# Patient Record
Sex: MALE | Race: WHITE | NOT HISPANIC OR LATINO | Employment: FULL TIME | URBAN - NONMETROPOLITAN AREA
[De-identification: names, ages, dates, MRNs, and addresses within clinical notes are randomized per-mention and may not be internally consistent; named-entity substitution may affect disease eponyms.]

---

## 2018-07-25 ENCOUNTER — PREP FOR SURGERY (OUTPATIENT)
Dept: OTHER | Facility: HOSPITAL | Age: 53
End: 2018-07-25

## 2018-07-25 DIAGNOSIS — H25.11 AGE-RELATED NUCLEAR CATARACT, RIGHT: Primary | ICD-10-CM

## 2018-07-25 RX ORDER — LIDOCAINE HYDROCHLORIDE 40 MG/ML
2 INJECTION, SOLUTION RETROBULBAR; TOPICAL
Status: CANCELLED | OUTPATIENT
Start: 2018-07-25 | End: 2018-08-20

## 2018-07-25 RX ORDER — OFLOXACIN 3 MG/ML
1 SOLUTION/ DROPS OPHTHALMIC EVERY 8 HOURS
COMMUNITY
End: 2021-07-27

## 2018-07-25 RX ORDER — POLYMYXIN B SULFATE AND TRIMETHOPRIM 1; 10000 MG/ML; [USP'U]/ML
1 SOLUTION OPHTHALMIC ONCE
Status: CANCELLED | OUTPATIENT
Start: 2018-07-25 | End: 2018-07-25

## 2018-07-25 RX ORDER — TETRACAINE HYDROCHLORIDE 5 MG/ML
2 SOLUTION OPHTHALMIC AS NEEDED
Status: CANCELLED | OUTPATIENT
Start: 2018-07-25 | End: 2019-07-25

## 2018-07-25 RX ORDER — LEVOTHYROXINE SODIUM 112 UG/1
112 TABLET ORAL DAILY
COMMUNITY

## 2018-07-25 RX ORDER — PHENYLEPHRINE HYDROCHLORIDE 100 MG/ML
1 SOLUTION/ DROPS OPHTHALMIC
Status: CANCELLED | OUTPATIENT
Start: 2018-07-25 | End: 2018-07-25

## 2018-07-25 RX ORDER — ACETAMINOPHEN 500 MG
1000 TABLET ORAL EVERY 6 HOURS PRN
COMMUNITY

## 2018-07-25 RX ORDER — IBUPROFEN 800 MG/1
800 TABLET ORAL EVERY 6 HOURS PRN
COMMUNITY
End: 2021-07-27

## 2018-07-25 RX ORDER — FLUTICASONE PROPIONATE 50 MCG
2 SPRAY, SUSPENSION (ML) NASAL DAILY
COMMUNITY

## 2018-07-25 RX ORDER — CYCLOPENTOLATE HYDROCHLORIDE 20 MG/ML
1 SOLUTION/ DROPS OPHTHALMIC
Status: CANCELLED | OUTPATIENT
Start: 2018-07-25 | End: 2018-07-25

## 2018-07-25 RX ORDER — PREDNISOLONE ACETATE 10 MG/ML
1 SUSPENSION/ DROPS OPHTHALMIC 4 TIMES DAILY
Status: CANCELLED | OUTPATIENT
Start: 2018-07-25 | End: 2019-07-25

## 2018-07-26 ENCOUNTER — ANESTHESIA EVENT (OUTPATIENT)
Dept: PERIOP | Facility: HOSPITAL | Age: 53
End: 2018-07-26

## 2018-07-26 ENCOUNTER — HOSPITAL ENCOUNTER (OUTPATIENT)
Facility: HOSPITAL | Age: 53
Setting detail: HOSPITAL OUTPATIENT SURGERY
Discharge: HOME | End: 2018-07-26
Attending: OPHTHALMOLOGY | Admitting: OPHTHALMOLOGY

## 2018-07-26 ENCOUNTER — ANESTHESIA (OUTPATIENT)
Dept: PERIOP | Facility: HOSPITAL | Age: 53
End: 2018-07-26

## 2018-07-26 VITALS
OXYGEN SATURATION: 98 % | WEIGHT: 270 LBS | DIASTOLIC BLOOD PRESSURE: 73 MMHG | HEART RATE: 54 BPM | HEIGHT: 69 IN | BODY MASS INDEX: 39.99 KG/M2 | RESPIRATION RATE: 20 BRPM | SYSTOLIC BLOOD PRESSURE: 122 MMHG | TEMPERATURE: 97.3 F

## 2018-07-26 DIAGNOSIS — H25.11 AGE-RELATED NUCLEAR CATARACT, RIGHT: ICD-10-CM

## 2018-07-26 PROCEDURE — V2632 POST CHMBR INTRAOCULAR LENS: HCPCS | Performed by: OPHTHALMOLOGY

## 2018-07-26 PROCEDURE — 25010000002 MIDAZOLAM PER 1 MG: Performed by: NURSE ANESTHETIST, CERTIFIED REGISTERED

## 2018-07-26 PROCEDURE — 25010000002 EPINEPHRINE PER 0.1 MG: Performed by: OPHTHALMOLOGY

## 2018-07-26 DEVICE — LENS ACRYSOF IQ 6X13MM SN60WF 25.5: Type: IMPLANTABLE DEVICE | Site: POSTERIOR CHAMBER | Status: FUNCTIONAL

## 2018-07-26 RX ORDER — TETRACAINE HYDROCHLORIDE 5 MG/ML
SOLUTION OPHTHALMIC AS NEEDED
Status: DISCONTINUED | OUTPATIENT
Start: 2018-07-26 | End: 2018-07-26 | Stop reason: HOSPADM

## 2018-07-26 RX ORDER — POLYMYXIN B SULFATE AND TRIMETHOPRIM 1; 10000 MG/ML; [USP'U]/ML
SOLUTION OPHTHALMIC AS NEEDED
Status: DISCONTINUED | OUTPATIENT
Start: 2018-07-26 | End: 2018-07-26 | Stop reason: HOSPADM

## 2018-07-26 RX ORDER — PHENYLEPHRINE HYDROCHLORIDE 100 MG/ML
1 SOLUTION/ DROPS OPHTHALMIC
Status: COMPLETED | OUTPATIENT
Start: 2018-07-26 | End: 2018-07-26

## 2018-07-26 RX ORDER — CYCLOPENTOLATE HYDROCHLORIDE 20 MG/ML
1 SOLUTION/ DROPS OPHTHALMIC
Status: COMPLETED | OUTPATIENT
Start: 2018-07-26 | End: 2018-07-26

## 2018-07-26 RX ORDER — PREDNISOLONE ACETATE 10 MG/ML
1 SUSPENSION/ DROPS OPHTHALMIC 4 TIMES DAILY
Status: DISCONTINUED | OUTPATIENT
Start: 2018-07-26 | End: 2018-07-26 | Stop reason: HOSPADM

## 2018-07-26 RX ORDER — LIDOCAINE HYDROCHLORIDE 40 MG/ML
SOLUTION TOPICAL AS NEEDED
Status: DISCONTINUED | OUTPATIENT
Start: 2018-07-26 | End: 2018-07-26 | Stop reason: HOSPADM

## 2018-07-26 RX ORDER — BALANCED SALT SOLUTION 6.4; .75; .48; .3; 3.9; 1.7 MG/ML; MG/ML; MG/ML; MG/ML; MG/ML; MG/ML
SOLUTION OPHTHALMIC AS NEEDED
Status: DISCONTINUED | OUTPATIENT
Start: 2018-07-26 | End: 2018-07-26 | Stop reason: HOSPADM

## 2018-07-26 RX ORDER — MIDAZOLAM HYDROCHLORIDE 1 MG/ML
INJECTION INTRAMUSCULAR; INTRAVENOUS AS NEEDED
Status: DISCONTINUED | OUTPATIENT
Start: 2018-07-26 | End: 2018-07-26 | Stop reason: SURG

## 2018-07-26 RX ORDER — SODIUM CHLORIDE, SODIUM LACTATE, POTASSIUM CHLORIDE, CALCIUM CHLORIDE 600; 310; 30; 20 MG/100ML; MG/100ML; MG/100ML; MG/100ML
1000 INJECTION, SOLUTION INTRAVENOUS CONTINUOUS
Status: DISCONTINUED | OUTPATIENT
Start: 2018-07-26 | End: 2018-07-26 | Stop reason: HOSPADM

## 2018-07-26 RX ORDER — EPINEPHRINE 1 MG/ML
INJECTION, SOLUTION, CONCENTRATE INTRAVENOUS AS NEEDED
Status: DISCONTINUED | OUTPATIENT
Start: 2018-07-26 | End: 2018-07-26 | Stop reason: HOSPADM

## 2018-07-26 RX ADMIN — PHENYLEPHRINE HYDROCHLORIDE 1 DROP: 100 SOLUTION/ DROPS OPHTHALMIC at 09:35

## 2018-07-26 RX ADMIN — CYCLOPENTOLATE HYDROCHLORIDE 1 DROP: 20 SOLUTION/ DROPS OPHTHALMIC at 09:35

## 2018-07-26 RX ADMIN — CYCLOPENTOLATE HYDROCHLORIDE 1 DROP: 20 SOLUTION/ DROPS OPHTHALMIC at 09:30

## 2018-07-26 RX ADMIN — CYCLOPENTOLATE HYDROCHLORIDE 1 DROP: 20 SOLUTION/ DROPS OPHTHALMIC at 09:25

## 2018-07-26 RX ADMIN — SODIUM CHLORIDE, POTASSIUM CHLORIDE, SODIUM LACTATE AND CALCIUM CHLORIDE 1000 ML: 600; 310; 30; 20 INJECTION, SOLUTION INTRAVENOUS at 09:33

## 2018-07-26 RX ADMIN — PHENYLEPHRINE HYDROCHLORIDE 1 DROP: 100 SOLUTION/ DROPS OPHTHALMIC at 09:30

## 2018-07-26 RX ADMIN — PHENYLEPHRINE HYDROCHLORIDE 1 DROP: 100 SOLUTION/ DROPS OPHTHALMIC at 09:25

## 2018-07-26 RX ADMIN — MIDAZOLAM HYDROCHLORIDE 0.5 MG: 1 INJECTION, SOLUTION INTRAMUSCULAR; INTRAVENOUS at 10:16

## 2018-07-26 NOTE — ANESTHESIA PREPROCEDURE EVALUATION
Anesthesia Evaluation     Patient summary reviewed and Nursing notes reviewed   no history of anesthetic complications:  NPO Solid Status: > 8 hours  NPO Liquid Status: > 8 hours           Airway   Mallampati: I  TM distance: >3 FB  Neck ROM: full  no difficulty expected  Dental - normal exam     Pulmonary - negative pulmonary ROS and normal exam   Cardiovascular - negative cardio ROS and normal exam        Neuro/Psych- negative ROS  GI/Hepatic/Renal/Endo - negative ROS     Musculoskeletal (-) negative ROS    Abdominal    Substance History - negative use     OB/GYN negative ob/gyn ROS         Other - negative ROS                       Anesthesia Plan    ASA 1     MAC     intravenous induction   Anesthetic plan and risks discussed with patient.

## 2018-07-26 NOTE — ANESTHESIA POSTPROCEDURE EVALUATION
Patient: Sandra Garza    Procedure Summary     Date:  07/26/18 Room / Location:  [facility] OR 1 / [facility] OR    Anesthesia Start:  1016 Anesthesia Stop:  1033    Procedure:  CATARACT PHACO EXTRACTION WITH INTRAOCULAR LENS IMPLANT RIGHT (Right Eye) Diagnosis:       Nuclear sclerotic cataract of right eye      (Nuclear sclerotic cataract of right eye [H25.11])    Surgeon:  Melquiades Campo MD Provider:  Shaheed Noe CRNA    Anesthesia Type:  MAC ASA Status:  1          Anesthesia Type: MAC  Last vitals  BP   122/73 (07/26/18 1105)   Temp   97.3 °F (36.3 °C) (07/26/18 1035)   Pulse   54 (07/26/18 1105)   Resp   20 (07/26/18 1105)     SpO2   98 % (07/26/18 1105)     Post Anesthesia Care and Evaluation    Patient location during evaluation: bedside  Patient participation: complete - patient participated  Level of consciousness: awake  Pain score: 0  Pain management: adequate  Airway patency: patent  Anesthetic complications: No anesthetic complications  PONV Status: controlled  Cardiovascular status: acceptable and stable  Respiratory status: acceptable and room air  Hydration status: acceptable

## 2018-08-06 ENCOUNTER — PREP FOR SURGERY (OUTPATIENT)
Dept: OTHER | Facility: HOSPITAL | Age: 53
End: 2018-08-06

## 2018-08-06 DIAGNOSIS — H25.12 AGE-RELATED NUCLEAR CATARACT, LEFT: Primary | ICD-10-CM

## 2018-08-06 RX ORDER — PREDNISOLONE ACETATE 10 MG/ML
1 SUSPENSION/ DROPS OPHTHALMIC 4 TIMES DAILY
Status: CANCELLED | OUTPATIENT
Start: 2018-08-06

## 2018-08-06 RX ORDER — TETRACAINE HYDROCHLORIDE 5 MG/ML
2 SOLUTION OPHTHALMIC AS NEEDED
Status: CANCELLED | OUTPATIENT
Start: 2018-08-06

## 2018-08-06 RX ORDER — PHENYLEPHRINE HYDROCHLORIDE 100 MG/ML
1 SOLUTION/ DROPS OPHTHALMIC
Status: CANCELLED | OUTPATIENT
Start: 2018-08-06 | End: 2018-08-06

## 2018-08-06 RX ORDER — CYCLOPENTOLATE HYDROCHLORIDE 20 MG/ML
1 SOLUTION/ DROPS OPHTHALMIC
Status: CANCELLED | OUTPATIENT
Start: 2018-08-06 | End: 2018-08-06

## 2018-08-06 RX ORDER — POLYMYXIN B SULFATE AND TRIMETHOPRIM 1; 10000 MG/ML; [USP'U]/ML
1 SOLUTION OPHTHALMIC ONCE
Status: CANCELLED | OUTPATIENT
Start: 2018-08-06 | End: 2018-08-06

## 2018-08-06 RX ORDER — LIDOCAINE HYDROCHLORIDE 40 MG/ML
2 INJECTION, SOLUTION RETROBULBAR; TOPICAL
Status: CANCELLED | OUTPATIENT
Start: 2018-08-06

## 2018-08-09 ENCOUNTER — ANESTHESIA (OUTPATIENT)
Dept: PERIOP | Facility: HOSPITAL | Age: 53
End: 2018-08-09

## 2018-08-09 ENCOUNTER — ANESTHESIA EVENT (OUTPATIENT)
Dept: PERIOP | Facility: HOSPITAL | Age: 53
End: 2018-08-09

## 2018-08-09 ENCOUNTER — HOSPITAL ENCOUNTER (OUTPATIENT)
Facility: HOSPITAL | Age: 53
Setting detail: HOSPITAL OUTPATIENT SURGERY
Discharge: HOME | End: 2018-08-09
Attending: OPHTHALMOLOGY | Admitting: OPHTHALMOLOGY

## 2018-08-09 VITALS
RESPIRATION RATE: 20 BRPM | OXYGEN SATURATION: 98 % | HEIGHT: 69 IN | WEIGHT: 270 LBS | HEART RATE: 59 BPM | SYSTOLIC BLOOD PRESSURE: 144 MMHG | TEMPERATURE: 97.3 F | BODY MASS INDEX: 39.99 KG/M2 | DIASTOLIC BLOOD PRESSURE: 79 MMHG

## 2018-08-09 DIAGNOSIS — H25.12 AGE-RELATED NUCLEAR CATARACT, LEFT: ICD-10-CM

## 2018-08-09 PROCEDURE — 25010000002 EPINEPHRINE PER 0.1 MG: Performed by: OPHTHALMOLOGY

## 2018-08-09 PROCEDURE — 25010000002 MIDAZOLAM PER 1 MG: Performed by: NURSE ANESTHETIST, CERTIFIED REGISTERED

## 2018-08-09 PROCEDURE — V2632 POST CHMBR INTRAOCULAR LENS: HCPCS | Performed by: OPHTHALMOLOGY

## 2018-08-09 DEVICE — LENS ACRYSOF IQ 6X13MM SN60WF 24.0: Type: IMPLANTABLE DEVICE | Site: POSTERIOR CHAMBER | Status: FUNCTIONAL

## 2018-08-09 RX ORDER — PREDNISOLONE ACETATE 10 MG/ML
1 SUSPENSION/ DROPS OPHTHALMIC 4 TIMES DAILY
Status: DISCONTINUED | OUTPATIENT
Start: 2018-08-09 | End: 2018-08-09 | Stop reason: HOSPADM

## 2018-08-09 RX ORDER — TETRACAINE HYDROCHLORIDE 5 MG/ML
SOLUTION OPHTHALMIC AS NEEDED
Status: DISCONTINUED | OUTPATIENT
Start: 2018-08-09 | End: 2018-08-09 | Stop reason: HOSPADM

## 2018-08-09 RX ORDER — CYCLOPENTOLATE HYDROCHLORIDE 20 MG/ML
1 SOLUTION/ DROPS OPHTHALMIC
Status: COMPLETED | OUTPATIENT
Start: 2018-08-09 | End: 2018-08-09

## 2018-08-09 RX ORDER — POLYMYXIN B SULFATE AND TRIMETHOPRIM 1; 10000 MG/ML; [USP'U]/ML
SOLUTION OPHTHALMIC AS NEEDED
Status: DISCONTINUED | OUTPATIENT
Start: 2018-08-09 | End: 2018-08-09 | Stop reason: HOSPADM

## 2018-08-09 RX ORDER — PHENYLEPHRINE HYDROCHLORIDE 100 MG/ML
1 SOLUTION/ DROPS OPHTHALMIC
Status: COMPLETED | OUTPATIENT
Start: 2018-08-09 | End: 2018-08-09

## 2018-08-09 RX ORDER — SODIUM CHLORIDE 0.9 % (FLUSH) 0.9 %
3 SYRINGE (ML) INJECTION AS NEEDED
Status: DISCONTINUED | OUTPATIENT
Start: 2018-08-09 | End: 2018-08-09 | Stop reason: HOSPADM

## 2018-08-09 RX ORDER — SODIUM CHLORIDE, SODIUM LACTATE, POTASSIUM CHLORIDE, CALCIUM CHLORIDE 600; 310; 30; 20 MG/100ML; MG/100ML; MG/100ML; MG/100ML
1000 INJECTION, SOLUTION INTRAVENOUS CONTINUOUS
Status: DISCONTINUED | OUTPATIENT
Start: 2018-08-09 | End: 2018-08-09 | Stop reason: HOSPADM

## 2018-08-09 RX ORDER — LIDOCAINE HYDROCHLORIDE 40 MG/ML
SOLUTION TOPICAL AS NEEDED
Status: DISCONTINUED | OUTPATIENT
Start: 2018-08-09 | End: 2018-08-09 | Stop reason: HOSPADM

## 2018-08-09 RX ORDER — BALANCED SALT SOLUTION 6.4; .75; .48; .3; 3.9; 1.7 MG/ML; MG/ML; MG/ML; MG/ML; MG/ML; MG/ML
SOLUTION OPHTHALMIC AS NEEDED
Status: DISCONTINUED | OUTPATIENT
Start: 2018-08-09 | End: 2018-08-09 | Stop reason: HOSPADM

## 2018-08-09 RX ORDER — EPINEPHRINE 1 MG/ML
INJECTION, SOLUTION, CONCENTRATE INTRAVENOUS AS NEEDED
Status: DISCONTINUED | OUTPATIENT
Start: 2018-08-09 | End: 2018-08-09 | Stop reason: HOSPADM

## 2018-08-09 RX ORDER — MIDAZOLAM HYDROCHLORIDE 1 MG/ML
INJECTION INTRAMUSCULAR; INTRAVENOUS AS NEEDED
Status: DISCONTINUED | OUTPATIENT
Start: 2018-08-09 | End: 2018-08-09 | Stop reason: SURG

## 2018-08-09 RX ADMIN — PHENYLEPHRINE HYDROCHLORIDE 1 DROP: 100 SOLUTION/ DROPS OPHTHALMIC at 09:47

## 2018-08-09 RX ADMIN — PHENYLEPHRINE HYDROCHLORIDE 1 DROP: 100 SOLUTION/ DROPS OPHTHALMIC at 09:37

## 2018-08-09 RX ADMIN — CYCLOPENTOLATE HYDROCHLORIDE 1 DROP: 20 SOLUTION/ DROPS OPHTHALMIC at 09:37

## 2018-08-09 RX ADMIN — SODIUM CHLORIDE, POTASSIUM CHLORIDE, SODIUM LACTATE AND CALCIUM CHLORIDE 1000 ML: 600; 310; 30; 20 INJECTION, SOLUTION INTRAVENOUS at 09:50

## 2018-08-09 RX ADMIN — MIDAZOLAM HYDROCHLORIDE 0.5 MG: 1 INJECTION, SOLUTION INTRAMUSCULAR; INTRAVENOUS at 10:28

## 2018-08-09 RX ADMIN — CYCLOPENTOLATE HYDROCHLORIDE 1 DROP: 20 SOLUTION/ DROPS OPHTHALMIC at 09:42

## 2018-08-09 RX ADMIN — CYCLOPENTOLATE HYDROCHLORIDE 1 DROP: 20 SOLUTION/ DROPS OPHTHALMIC at 09:47

## 2018-08-09 RX ADMIN — PHENYLEPHRINE HYDROCHLORIDE 1 DROP: 100 SOLUTION/ DROPS OPHTHALMIC at 09:42

## 2018-08-09 NOTE — ANESTHESIA POSTPROCEDURE EVALUATION
Patient: Sandra Garza    Procedure Summary     Date:  08/09/18 Room / Location:  [facility] OR 1 /  [facility] OR    Anesthesia Start:  1026 Anesthesia Stop:  1044    Procedure:  CATARACT PHACO EXTRACTION WITH INTRAOCULAR LENS IMPLANT LEFT (Left Eye) Diagnosis:       Nuclear sclerotic cataract of left eye      (Nuclear sclerotic cataract of left eye [H25.12])    Surgeon:  Melquiades Campo MD Provider:  Isela Mejia CRNA    Anesthesia Type:  MAC ASA Status:  1          Anesthesia Type: MAC  Last vitals  BP   141/92 (08/09/18 1047)   Temp   97.3 °F (36.3 °C) (08/09/18 1047)   Pulse   58 (08/09/18 1047)   Resp   20 (08/09/18 1047)     SpO2   98 % (08/09/18 1047)     Post Anesthesia Care and Evaluation    Patient location during evaluation: PHASE II  Patient participation: complete - patient participated  Level of consciousness: awake and alert  Pain score: 0  Pain management: satisfactory to patient  Airway patency: patent  Anesthetic complications: No anesthetic complications  PONV Status: none  Cardiovascular status: acceptable and stable  Respiratory status: acceptable  Hydration status: acceptable
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
## 2018-08-09 NOTE — ANESTHESIA PREPROCEDURE EVALUATION
Anesthesia Evaluation     Patient summary reviewed and Nursing notes reviewed   no history of anesthetic complications:  NPO Solid Status: > 8 hours  NPO Liquid Status: > 8 hours           Airway   Mallampati: I  TM distance: >3 FB  Neck ROM: full  no difficulty expected  Dental - normal exam     Pulmonary - negative pulmonary ROS and normal exam   Cardiovascular - negative cardio ROS and normal exam        Neuro/Psych- negative ROS  GI/Hepatic/Renal/Endo - negative ROS     Musculoskeletal (-) negative ROS    Abdominal    Substance History - negative use     OB/GYN negative ob/gyn ROS         Other - negative ROS                         Anesthesia Plan    ASA 1     MAC   (Risks and benefits discussed including risk of aspiration, recall and dental damage. All patient questions answered. Will continue with POC.)  intravenous induction   Anesthetic plan and risks discussed with patient.

## 2018-08-09 NOTE — DISCHARGE INSTRUCTIONS
Please follow all post op instructions and follow up appointment time from your physician's office included in your discharge packet.  .   No pushing, pulling, tugging,  heavy lifting, or strenuous activity.  No major decision making, driving, or drinking alcoholic beverages for 24 hours. ( due to the medications you have  received)  Always use good hand hygiene/washing techniques.  NO driving while taking pain medications.      To assist you in voiding:  Drink plenty of fluids  Listen to running water while attempting to void.    If you are unable to urinate and you have an uncomfortable urge to void or it has been   6 hours since you were discharged, return to the Emergency Room

## 2021-06-04 ENCOUNTER — TELEPHONE (OUTPATIENT)
Dept: SURGERY | Facility: CLINIC | Age: 56
End: 2021-06-04

## 2021-06-07 NOTE — TELEPHONE ENCOUNTER
Pt scheduled for open access screening colonoscopy @  07/28/2021.PRESCREENING FOR OPEN ACCESS SCHEDULING    Sandra Garza, 1965  [id number]    06/07/21    If, the patient answers yes to any of the following questions the provider will be informed prior to scheduling open access for approval and documented in the chart.    []  Yes  [x] No    1. Have you ever had a colonoscopy in the past?      When:        Where:       Polyps or other:     []  Yes  [x] No    2. Family history of colon cancer?      Relation:       Age of onset:       Do you currently have any of the following?    []  Yes  [x] No  Rectal bleeding, if so, how long?     []  Yes  [x] No  Abdominal pain, if so, how long?    []  Yes  [x] No  Constipation, if so, how long?    []  Yes  [x] No  Diarrhea, if so, how long?    []  Yes  [x] No  Weight loss, is so, how much?    [] Yes  [x] No  Small caliber stool, if so, how long?      Have you ever had any of the following conditions?    [] Yes  [x] No  Heart attack?      When?       Last cardiac workup?     Blood thinners?    [] Yes  [x] No   Lung problems, asthma or COPD?  [] Yes  [x] No  Oxygen required?       [] Yes  [x] No  Stroke?     [] Yes  [x] No  Have you ever had a reaction to anesthesia?

## 2021-06-08 RX ORDER — BISACODYL 5 MG
TABLET, DELAYED RELEASE (ENTERIC COATED) ORAL
Qty: 4 TABLET | Refills: 0 | Status: SHIPPED | OUTPATIENT
Start: 2021-06-08

## 2021-06-08 RX ORDER — POLYETHYLENE GLYCOL 3350 17 G/17G
238 POWDER, FOR SOLUTION ORAL ONCE
Qty: 238 G | Refills: 0 | Status: SHIPPED | OUTPATIENT
Start: 2021-06-08 | End: 2021-06-08

## 2021-06-11 ENCOUNTER — PREP FOR SURGERY (OUTPATIENT)
Dept: OTHER | Facility: HOSPITAL | Age: 56
End: 2021-06-11

## 2021-06-11 DIAGNOSIS — Z12.11 SCREENING FOR COLON CANCER: Primary | ICD-10-CM

## 2021-06-29 ENCOUNTER — PREP FOR SURGERY (OUTPATIENT)
Dept: OTHER | Facility: HOSPITAL | Age: 56
End: 2021-06-29

## 2021-06-29 DIAGNOSIS — Z12.11 SCREENING FOR COLON CANCER: Primary | ICD-10-CM

## 2021-07-14 PROBLEM — Z12.11 SCREENING FOR COLON CANCER: Status: ACTIVE | Noted: 2021-07-14

## 2021-07-27 RX ORDER — NAPROXEN 500 MG/1
500 TABLET ORAL 2 TIMES DAILY WITH MEALS
COMMUNITY

## 2021-07-27 RX ORDER — MECLIZINE HYDROCHLORIDE 25 MG/1
25 TABLET ORAL 3 TIMES DAILY PRN
COMMUNITY

## 2021-07-27 RX ORDER — ASPIRIN 325 MG
325 TABLET, DELAYED RELEASE (ENTERIC COATED) ORAL DAILY
COMMUNITY

## 2021-07-27 RX ORDER — ATORVASTATIN CALCIUM 20 MG/1
20 TABLET, FILM COATED ORAL DAILY
COMMUNITY

## 2021-07-27 RX ORDER — TAMSULOSIN HYDROCHLORIDE 0.4 MG/1
1 CAPSULE ORAL DAILY
COMMUNITY

## 2021-07-28 ENCOUNTER — ANESTHESIA (OUTPATIENT)
Dept: GASTROENTEROLOGY | Facility: HOSPITAL | Age: 56
End: 2021-07-28

## 2021-07-28 ENCOUNTER — ANESTHESIA EVENT (OUTPATIENT)
Dept: GASTROENTEROLOGY | Facility: HOSPITAL | Age: 56
End: 2021-07-28

## 2021-07-28 ENCOUNTER — HOSPITAL ENCOUNTER (OUTPATIENT)
Facility: HOSPITAL | Age: 56
Setting detail: HOSPITAL OUTPATIENT SURGERY
Discharge: HOME | End: 2021-07-28
Attending: SURGERY | Admitting: SURGERY

## 2021-07-28 VITALS
HEIGHT: 68 IN | WEIGHT: 300 LBS | RESPIRATION RATE: 16 BRPM | SYSTOLIC BLOOD PRESSURE: 136 MMHG | DIASTOLIC BLOOD PRESSURE: 74 MMHG | OXYGEN SATURATION: 96 % | TEMPERATURE: 97.3 F | HEART RATE: 70 BPM | BODY MASS INDEX: 45.47 KG/M2

## 2021-07-28 DIAGNOSIS — Z12.11 SCREENING FOR COLON CANCER: ICD-10-CM

## 2021-07-28 PROCEDURE — 45384 COLONOSCOPY W/LESION REMOVAL: CPT | Performed by: SURGERY

## 2021-07-28 PROCEDURE — 25010000002 PROPOFOL 10 MG/ML EMULSION: Performed by: NURSE ANESTHETIST, CERTIFIED REGISTERED

## 2021-07-28 PROCEDURE — S0260 H&P FOR SURGERY: HCPCS | Performed by: SURGERY

## 2021-07-28 RX ORDER — SODIUM CHLORIDE 0.9 % (FLUSH) 0.9 %
10 SYRINGE (ML) INJECTION AS NEEDED
Status: DISCONTINUED | OUTPATIENT
Start: 2021-07-28 | End: 2021-07-28 | Stop reason: HOSPADM

## 2021-07-28 RX ORDER — SODIUM CHLORIDE, SODIUM LACTATE, POTASSIUM CHLORIDE, CALCIUM CHLORIDE 600; 310; 30; 20 MG/100ML; MG/100ML; MG/100ML; MG/100ML
1000 INJECTION, SOLUTION INTRAVENOUS CONTINUOUS
Status: DISCONTINUED | OUTPATIENT
Start: 2021-07-28 | End: 2021-07-28 | Stop reason: HOSPADM

## 2021-07-28 RX ORDER — LIDOCAINE HYDROCHLORIDE 20 MG/ML
INJECTION, SOLUTION INTRAVENOUS AS NEEDED
Status: DISCONTINUED | OUTPATIENT
Start: 2021-07-28 | End: 2021-07-28 | Stop reason: SURG

## 2021-07-28 RX ORDER — PROPOFOL 10 MG/ML
VIAL (ML) INTRAVENOUS AS NEEDED
Status: DISCONTINUED | OUTPATIENT
Start: 2021-07-28 | End: 2021-07-28 | Stop reason: SURG

## 2021-07-28 RX ORDER — ONDANSETRON 2 MG/ML
4 INJECTION INTRAMUSCULAR; INTRAVENOUS ONCE AS NEEDED
Status: CANCELLED | OUTPATIENT
Start: 2021-07-28 | End: 2021-07-28

## 2021-07-28 RX ADMIN — SODIUM CHLORIDE, POTASSIUM CHLORIDE, SODIUM LACTATE AND CALCIUM CHLORIDE 1000 ML: 600; 310; 30; 20 INJECTION, SOLUTION INTRAVENOUS at 09:03

## 2021-07-28 RX ADMIN — LIDOCAINE HYDROCHLORIDE 100 MG: 20 INJECTION, SOLUTION INTRAVENOUS at 09:47

## 2021-07-28 RX ADMIN — PROPOFOL 150 MG: 10 INJECTION, EMULSION INTRAVENOUS at 09:58

## 2021-07-28 NOTE — ANESTHESIA PREPROCEDURE EVALUATION
Anesthesia Evaluation     Patient summary reviewed and Nursing notes reviewed   no history of anesthetic complications:  NPO Solid Status: > 8 hours  NPO Liquid Status: > 8 hours           Airway   Mallampati: II  TM distance: >3 FB  Neck ROM: full  no difficulty expected  Dental - normal exam     Pulmonary - normal exam   (+) a smoker Former,   Cardiovascular - normal exam    Rhythm: regular  Rate: normal    (+) hyperlipidemia,       Neuro/Psych  (+) psychiatric history Anxiety and Depression,     GI/Hepatic/Renal/Endo    (+)   renal disease stones,     Musculoskeletal (-) negative ROS    Abdominal    Substance History - negative use     OB/GYN negative ob/gyn ROS         Other - negative ROS                       Anesthesia Plan    ASA 3     MAC   (Pt told that intravenous sedation will be used as the primary anesthetic along with local anesthesia if necessary. Every effort will be made to make sure the patient is comfortable.     The patient was told they may or may not have recall for the procedure. It was further explained that if the MAC was not adequate that a general anesthetic with either an LMA or endotracheal tube would be required.     Will proceed with the plan of care.)  intravenous induction     Anesthetic plan, all risks, benefits, and alternatives have been provided, discussed and informed consent has been obtained with: patient.

## 2021-07-28 NOTE — ANESTHESIA POSTPROCEDURE EVALUATION
Patient: Sandra Garza    Procedure Summary     Date: 07/28/21 Room / Location: [facility] ENDOSCOPY 3 / [facility] ENDOSCOPY    Anesthesia Start: 0944 Anesthesia Stop: 1001    Procedure: COLONOSCOPY (N/A ) Diagnosis:       Screening for colon cancer      (Screening for colon cancer [Z12.11])    Surgeons: Jimmy Donohue MD Provider: Melquiades Hancock CRNA    Anesthesia Type: MAC ASA Status: 3          Anesthesia Type: MAC    Vitals  Vitals Value Taken Time   /76 07/28/21 1002   Temp 97.3 °F (36.3 °C) 07/28/21 1002   Pulse 74 07/28/21 1002   Resp 16 07/28/21 1002   SpO2 94 % 07/28/21 1002           Post Anesthesia Care and Evaluation    Patient location during evaluation: PHASE II  Patient participation: complete - patient participated  Level of consciousness: awake  Pain score: 1  Pain management: adequate  Airway patency: patent  Anesthetic complications: No anesthetic complications  PONV Status: controlled  Cardiovascular status: acceptable and stable  Respiratory status: acceptable  Hydration status: acceptable      
year(s)

## 2021-07-31 LAB
LAB AP CASE REPORT: NORMAL
PATH REPORT.FINAL DX SPEC: NORMAL

## 2023-03-02 ENCOUNTER — OFFICE VISIT (OUTPATIENT)
Dept: PULMONOLOGY | Facility: CLINIC | Age: 58
End: 2023-03-02
Payer: COMMERCIAL

## 2023-03-02 VITALS
HEART RATE: 81 BPM | BODY MASS INDEX: 47.74 KG/M2 | RESPIRATION RATE: 18 BRPM | HEIGHT: 68 IN | OXYGEN SATURATION: 98 % | DIASTOLIC BLOOD PRESSURE: 84 MMHG | WEIGHT: 315 LBS | SYSTOLIC BLOOD PRESSURE: 138 MMHG

## 2023-03-02 DIAGNOSIS — R06.83 SNORING: ICD-10-CM

## 2023-03-02 DIAGNOSIS — G47.19 EXCESSIVE DAYTIME SLEEPINESS: ICD-10-CM

## 2023-03-02 DIAGNOSIS — E66.01 MORBID OBESITY, UNSPECIFIED OBESITY TYPE: ICD-10-CM

## 2023-03-02 DIAGNOSIS — G47.33 OBSTRUCTIVE SLEEP APNEA: Primary | ICD-10-CM

## 2023-03-02 PROCEDURE — 99204 OFFICE O/P NEW MOD 45 MIN: CPT | Performed by: INTERNAL MEDICINE

## 2023-03-02 RX ORDER — FINASTERIDE 5 MG/1
5 TABLET, FILM COATED ORAL DAILY
COMMUNITY

## 2023-03-02 RX ORDER — DICLOFENAC SODIUM AND MISOPROSTOL 75; 200 MG/1; UG/1
1 TABLET, DELAYED RELEASE ORAL 2 TIMES DAILY
COMMUNITY
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
## 2023-03-06 ENCOUNTER — PATIENT ROUNDING (BHMG ONLY) (OUTPATIENT)
Dept: PULMONOLOGY | Facility: CLINIC | Age: 58
End: 2023-03-06
Payer: COMMERCIAL

## 2023-03-22 ENCOUNTER — HOSPITAL ENCOUNTER (OUTPATIENT)
Dept: SLEEP MEDICINE | Facility: HOSPITAL | Age: 58
Discharge: HOME | End: 2023-03-22
Admitting: INTERNAL MEDICINE
Payer: COMMERCIAL

## 2023-03-22 DIAGNOSIS — R06.83 SNORING: ICD-10-CM

## 2023-03-22 DIAGNOSIS — G47.33 OBSTRUCTIVE SLEEP APNEA: ICD-10-CM

## 2023-03-22 DIAGNOSIS — G47.19 EXCESSIVE DAYTIME SLEEPINESS: ICD-10-CM

## 2023-03-22 DIAGNOSIS — E66.01 MORBID OBESITY, UNSPECIFIED OBESITY TYPE: ICD-10-CM

## 2023-03-22 PROCEDURE — 95806 SLEEP STUDY UNATT&RESP EFFT: CPT

## 2023-03-23 PROCEDURE — 95806 SLEEP STUDY UNATT&RESP EFFT: CPT | Performed by: INTERNAL MEDICINE

## 2023-03-24 DIAGNOSIS — G47.33 OSA (OBSTRUCTIVE SLEEP APNEA): Primary | ICD-10-CM

## 2023-04-28 ENCOUNTER — TELEPHONE (OUTPATIENT)
Dept: PULMONOLOGY | Facility: CLINIC | Age: 58
End: 2023-04-28
Payer: COMMERCIAL

## 2023-04-28 NOTE — TELEPHONE ENCOUNTER
"Per [facility]   \"I wanted to loop you in on a patient Sandra Garza 1965 he has been hard to get up with. We called multiple times, no answer we did drive by, talked to pt wife at drive by 4/18 and here is the rt note:    WENT TO PATIENT'S HOME TODAY FOR DRIVE BY DUE TO BEING UNABLE TO CONTACT THROUGH PHONE CALLS AND TEXTS. PT'S WIFE WAS HOME. EXPLAINED THAT WE HAVE TRIED TO CONTACT, BUT HAVE BEEN UNABLE. STATES PT IS A  AND DOESN'T GET HOME UNTIL 4:30. OFFERED TO SET UP CPAP WITH HER AND SHE STATED THAT HE HAD A CPAP A FEW YEARS AGO, SO HE KNOWS THE BASICS. BROUGHT EQUIPMENT AND SLEEP-[employer]-RECOMMENDED [employer]ASK AND INFORMED SPOUSE THAT DEDUCTIBLE PAYMENT WILL BE DUE. SHE REFUSED SETUP. OFFERED PAYMENT PLAN AND HARDSHIP OPTIONS. SHE REFUSED TODAY AND STATED SHE WOULD HAVE HIM CALL US IF HE IS INTERESTED.     We contacted them again, had setup scheduled again for today, they refused again and did not want to do hardship.\"      "

## 2023-06-06 ENCOUNTER — APPOINTMENT (OUTPATIENT)
Dept: GENERAL RADIOLOGY | Facility: HOSPITAL | Age: 58
End: 2023-06-06
Payer: COMMERCIAL

## 2023-06-06 ENCOUNTER — HOSPITAL ENCOUNTER (EMERGENCY)
Facility: HOSPITAL | Age: 58
Discharge: HOME | End: 2023-06-06
Attending: EMERGENCY MEDICINE | Admitting: EMERGENCY MEDICINE
Payer: COMMERCIAL

## 2023-06-06 VITALS
HEART RATE: 99 BPM | SYSTOLIC BLOOD PRESSURE: 155 MMHG | WEIGHT: 310 LBS | OXYGEN SATURATION: 95 % | BODY MASS INDEX: 46.98 KG/M2 | DIASTOLIC BLOOD PRESSURE: 82 MMHG | TEMPERATURE: 98.6 F | RESPIRATION RATE: 18 BRPM | HEIGHT: 68 IN

## 2023-06-06 DIAGNOSIS — J44.1 COPD EXACERBATION: Primary | ICD-10-CM

## 2023-06-06 DIAGNOSIS — J40 BRONCHITIS: ICD-10-CM

## 2023-06-06 LAB
FLUAV RNA RESP QL NAA+PROBE: NOT DETECTED
FLUBV RNA RESP QL NAA+PROBE: NOT DETECTED
SARS-COV-2 RNA RESP QL NAA+PROBE: NOT DETECTED

## 2023-06-06 PROCEDURE — 71045 X-RAY EXAM CHEST 1 VIEW: CPT

## 2023-06-06 PROCEDURE — 99283 EMERGENCY DEPT VISIT LOW MDM: CPT

## 2023-06-06 PROCEDURE — 96372 THER/PROPH/DIAG INJ SC/IM: CPT

## 2023-06-06 PROCEDURE — 25010000002 DEXAMETHASONE SODIUM PHOSPHATE 10 MG/ML SOLUTION: Performed by: PHYSICIAN ASSISTANT

## 2023-06-06 PROCEDURE — 87636 SARSCOV2 & INF A&B AMP PRB: CPT | Performed by: PHYSICIAN ASSISTANT

## 2023-06-06 RX ORDER — ALBUTEROL SULFATE 90 UG/1
2 AEROSOL, METERED RESPIRATORY (INHALATION) ONCE
Status: COMPLETED | OUTPATIENT
Start: 2023-06-06 | End: 2023-06-06

## 2023-06-06 RX ORDER — GUAIFENESIN DEXTROMETHORPHAN HYDROBROMIDE ORAL SOLUTION 10; 100 MG/5ML; MG/5ML
5 SOLUTION ORAL EVERY 12 HOURS
Qty: 118 ML | Refills: 0 | Status: SHIPPED | OUTPATIENT
Start: 2023-06-06

## 2023-06-06 RX ORDER — GUAIFENESIN/DEXTROMETHORPHAN 100-10MG/5
5 SYRUP ORAL ONCE
Status: COMPLETED | OUTPATIENT
Start: 2023-06-06 | End: 2023-06-06

## 2023-06-06 RX ORDER — DEXAMETHASONE SODIUM PHOSPHATE 10 MG/ML
10 INJECTION, SOLUTION INTRAMUSCULAR; INTRAVENOUS ONCE
Status: COMPLETED | OUTPATIENT
Start: 2023-06-06 | End: 2023-06-06

## 2023-06-06 RX ORDER — DOXYCYCLINE 100 MG/1
100 CAPSULE ORAL 2 TIMES DAILY
Qty: 13 CAPSULE | Refills: 0 | Status: SHIPPED | OUTPATIENT
Start: 2023-06-06 | End: 2023-06-13

## 2023-06-06 RX ORDER — DOXYCYCLINE 100 MG/1
100 CAPSULE ORAL ONCE
Status: COMPLETED | OUTPATIENT
Start: 2023-06-06 | End: 2023-06-06

## 2023-06-06 RX ORDER — DOXYCYCLINE 100 MG/1
100 CAPSULE ORAL ONCE
Status: DISCONTINUED | OUTPATIENT
Start: 2023-06-06 | End: 2023-06-07 | Stop reason: HOSPADM

## 2023-06-06 RX ADMIN — DEXAMETHASONE SODIUM PHOSPHATE 10 MG: 10 INJECTION INTRAMUSCULAR; INTRAVENOUS at 22:16

## 2023-06-06 RX ADMIN — DOXYCYCLINE 100 MG: 100 CAPSULE ORAL at 22:55

## 2023-06-06 RX ADMIN — GUAIFENESIN AND DEXTROMETHORPHAN 5 ML: 100; 10 SYRUP ORAL at 22:16

## 2023-06-06 RX ADMIN — ALBUTEROL SULFATE 2 PUFF: 90 AEROSOL, METERED RESPIRATORY (INHALATION) at 22:16

## 2023-06-07 NOTE — DISCHARGE INSTRUCTIONS
Take antibiotic as prescribed.  Use albuterol inhaler and nebulizers as prescribed.  Drink plenty of fluids.  Take Robitussin-DM as prescribed as needed for cough.  Decadron injection is long-acting and should help over the next few days.  Follow-up with your PCP in a week for reevaluation.  Return to the ER for new or worsening symptoms or acute concerns.

## 2023-06-07 NOTE — ED PROVIDER NOTES
"Subjective:  Chief Complaint: Cough, congestion    History of Present Illness:  Patient is a 57-year-old male with history of anxiety, thyroid disease, hypercholesterolemia, kidney stones, sleep apnea, among others presenting to the ER with complaints of cough and congestion for the last 3 to 4 days.  Patient states that it started with a little cough about a week and a half ago but has gotten significantly worse over the last 3 to 4 days.  He states that he has been using nebulizers at home where he used to smoke.  He states that the nebulizers will help for a short amount of time but then symptoms worsen again.  He denies any additional medications or treatments prior to arrival.  He states he does get bronchitis sometimes and a steroid shot usually helps.  Denies history of diabetes.  Patient states he does wear a CPAP machine and has been wearing this for the last month for sleep apnea.  He states that he has to wear it because he is a .  He states that the machine is aggravating.    Nurses Notes reviewed and agree, including vitals, allergies, social history and prior medical history.     REVIEW OF SYSTEMS: All systems reviewed and not pertinent unless noted.  Review of Systems   HENT:  Positive for congestion.    Respiratory:  Positive for cough.    All other systems reviewed and are negative.    Past Medical History:   Diagnosis Date    Anxiety     IN THE PAST    Cataract, bilateral     Disease of thyroid gland     Elevated cholesterol     Enlarged prostate     History of being tatooed     History of nuclear stress test 2006    \"IT WAS NORMAL\".  [facility]    Kidney stone     Seasonal allergies     Wears glasses     reading only       Allergies:    Sulfa antibiotics      Past Surgical History:   Procedure Laterality Date    ADENOIDECTOMY      APPENDECTOMY      CATARACT EXTRACTION W/ INTRAOCULAR LENS IMPLANT Right 7/26/2018    Procedure: CATARACT PHACO EXTRACTION WITH INTRAOCULAR LENS IMPLANT " "RIGHT;  Surgeon: Melquiades Campo MD;  Location: [facility] OR;  Service: Ophthalmology    CATARACT EXTRACTION W/ INTRAOCULAR LENS IMPLANT Left 2018    Procedure: CATARACT PHACO EXTRACTION WITH INTRAOCULAR LENS IMPLANT LEFT;  Surgeon: Melquiades Campo MD;  Location: [facility] OR;  Service: Ophthalmology    COLONOSCOPY N/A 2021    Procedure: COLONOSCOPY WITH POLYPECTOMY;  Surgeon: Jimmy Donohue MD;  Location: [facility] ENDOSCOPY;  Service: Gastroenterology;  Laterality: N/A;    EAR TUBES      OTHER SURGICAL HISTORY      \"INGROWN HAIR\" LEFT PUBIC AREA.      TONSILLECTOMY           Social History     Socioeconomic History    Marital status:    Tobacco Use    Smoking status: Former     Packs/day: 0.50     Years: 28.00     Pack years: 14.00     Types: Cigarettes     Quit date: 2008     Years since quittin.8    Smokeless tobacco: Current     Types: Snuff   Vaping Use    Vaping Use: Never used   Substance and Sexual Activity    Alcohol use: Yes     Comment: occ.    Drug use: No    Sexual activity: Defer         No family history on file.    Objective  Physical Exam:  /82 (BP Location: Left arm, Patient Position: Sitting)   Pulse 99   Temp 98.6 °F (37 °C) (Oral)   Resp 18   Ht 172.7 cm (68\")   Wt (!) 141 kg (310 lb)   SpO2 95%   BMI 47.14 kg/m²      Physical Exam  Vitals and nursing note reviewed.   Constitutional:       General: He is not in acute distress.     Appearance: He is not toxic-appearing.   HENT:      Head: Normocephalic and atraumatic.      Right Ear: Tympanic membrane, ear canal and external ear normal.      Left Ear: Tympanic membrane, ear canal and external ear normal.      Nose: Nose normal.      Mouth/Throat:      Pharynx: Posterior oropharyngeal erythema present. No oropharyngeal exudate.   Eyes:      Extraocular Movements: Extraocular movements intact.      Conjunctiva/sclera: Conjunctivae normal.   Cardiovascular:      Rate and Rhythm: Normal rate.      Heart sounds: Normal heart " sounds.   Pulmonary:      Effort: Pulmonary effort is normal. No respiratory distress.      Breath sounds: Wheezing present.   Abdominal:      General: There is no distension.      Palpations: Abdomen is soft.   Musculoskeletal:         General: Normal range of motion.      Cervical back: Normal range of motion and neck supple.   Skin:     General: Skin is warm and dry.   Neurological:      General: No focal deficit present.      Mental Status: He is alert and oriented to person, place, and time.   Psychiatric:         Mood and Affect: Mood normal.         Behavior: Behavior normal.       Procedures    ED Course:         Lab Results (last 24 hours)       Procedure Component Value Units Date/Time    COVID-19 and FLU A/B PCR - Swab, Nasopharynx [[id number]]  (Normal) Collected: 06/06/23 2208    Specimen: Swab from Nasopharynx Updated: 06/06/23 2237     COVID19 Not Detected     Influenza A PCR Not Detected     Influenza B PCR Not Detected    Narrative:      Fact sheet for providers: https://www.fda.gov/media/935997/download    Fact sheet for patients: https://www.fda.gov/media/279736/download    Test performed by PCR.             No radiology results from the last 24 hrs       MDM  Patient was evaluated in the ER for cough and congestion that has been going on for around a week and a half but worsened over the last 3 to 4 days.  He is hemodynamically stable, afebrile, nontoxic-appearing on exam.  Differential diagnosis includes but is not limited to bronchitis, viral respiratory infection such as COVID or flu, pneumonia, among others.  Initial plan includes COVID/flu swab, chest x-ray, and treatment with albuterol inhaler, Robitussin-DM, and Decadron injection.    Chest x-ray reveals no focal pneumonia, mass, or pleural effusion.  COVID and flu are negative.  Patient does tell me that he has been using tap water in his CPAP machine.  He was advised to clean it well and to only use distilled water in the CPAP machine.   He is agreeable with plan for discharge.  He was given a prescription for doxycycline with first dose given in the ER as well as a prescription for Robitussin-DM.  He was advised to follow-up with his PCP in 1 week for reevaluation and strict return precautions were given for any new or worsening symptoms or acute concerns.    Final diagnoses:   COPD exacerbation   Bronchitis          Courtney Small, EUNICE  06/06/23 3188

## 2023-07-26 ENCOUNTER — OFFICE VISIT (OUTPATIENT)
Dept: PULMONOLOGY | Facility: CLINIC | Age: 58
End: 2023-07-26
Payer: COMMERCIAL

## 2023-07-26 VITALS
HEIGHT: 68 IN | SYSTOLIC BLOOD PRESSURE: 148 MMHG | RESPIRATION RATE: 18 BRPM | HEART RATE: 91 BPM | OXYGEN SATURATION: 98 % | BODY MASS INDEX: 47.74 KG/M2 | DIASTOLIC BLOOD PRESSURE: 86 MMHG | WEIGHT: 315 LBS

## 2023-07-26 DIAGNOSIS — G47.19 EXCESSIVE DAYTIME SLEEPINESS: ICD-10-CM

## 2023-07-26 DIAGNOSIS — E66.01 MORBID OBESITY WITH BMI OF 50.0-59.9, ADULT: ICD-10-CM

## 2023-07-26 DIAGNOSIS — G47.33 OSA (OBSTRUCTIVE SLEEP APNEA): Primary | ICD-10-CM

## 2023-07-26 PROCEDURE — 99213 OFFICE O/P EST LOW 20 MIN: CPT | Performed by: NURSE PRACTITIONER

## 2023-07-26 RX ORDER — SPIRONOLACTONE 25 MG/1
25 TABLET ORAL DAILY
COMMUNITY

## 2023-07-26 RX ORDER — HYDROCODONE BITARTRATE AND ACETAMINOPHEN 5; 325 MG/1; MG/1
1 TABLET ORAL EVERY 6 HOURS PRN
COMMUNITY

## 2023-07-26 NOTE — PROGRESS NOTES
"Chief Complaint   Patient presents with    Sleeping Problem    Follow-up         Subjective   Sandra Garza is a 57 y.o. male.   The patient comes in today for follow-up of obstructive sleep apnea.    I reviewed his sleep study and discussed the results with him today.  The sleep study revealed severe sleep apnea with an apnea hypopnea index of 54.6 per hour.      He has been set up with AutoPAP at a pressure of 8/16.  He has had the machine since May he thinks. He feels the pressure is too high at times and he has had issues with the mask.     He now has a different mask now but feels like the air is too hot. This mask fits better and he is not waking to adjust it.       The following portions of the patient's history were reviewed and updated as appropriate: allergies, current medications, past family history, past medical history, past social history, and past surgical history.    Review of Systems   HENT:  Negative for sinus pressure, sneezing and sore throat.    Respiratory:  Negative for cough, chest tightness, shortness of breath and wheezing.        Objective   Visit Vitals  /86   Pulse 91   Resp 18   Ht 172.7 cm (68\") Comment: pt reported   Wt (!) 157 kg (347 lb)   SpO2 98%   BMI 52.76 kg/m²       Physical Exam  Vitals reviewed.   Constitutional:       Appearance: He is well-developed.   HENT:      Head: Atraumatic.      Mouth/Throat:      Mouth: Mucous membranes are moist.      Comments: Crowded oropharynx.  Eyes:      Extraocular Movements: Extraocular movements intact.   Cardiovascular:      Rate and Rhythm: Normal rate and regular rhythm.   Abdominal:      Comments: Obese abdomen.   Skin:     General: Skin is warm.   Neurological:      Mental Status: He is alert and oriented to person, place, and time.         Assessment & Plan   Diagnoses and all orders for this visit:    1. EVA (obstructive sleep apnea) (Primary)  -     BIPAP / CPAP Adjustment    2. Excessive daytime sleepiness    3. " Morbid obesity with BMI of 50.0-59.9, adult           Return in about 4 months (around 11/26/2023) for Recheck, For Dr. Castillo, Sleep ONLY.    DISCUSSION (if any):  Symptoms consistent with partially controlled EVA. He continues to struggle getting used to the machine/mask.     Continue treatment with AutoPAP at a pressure of 8/16, with a full-face mask.    Patient's compliance data was reviewed and he is not compliant at this time.     The new mask is more comfortable but the air is too hot. I have placed order for DME to turn off heat.     I have encouraged him to use it about 1 hour in the evening if possible to get more hours and get used to the machine/mask.    If he continues having issues with the pressure, he will need a titration study. He verbalizes understanding.     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     Humidification setup, hose and mask care discussed.    Weight loss advised.    Use every night for at least 4 hours stressed.       Dictated utilizing Dragon dictation.    This document was electronically signed by MARIAELENA Vale July 26, 2023  15:20 EDT

## 2023-11-29 ENCOUNTER — TELEPHONE (OUTPATIENT)
Dept: PULMONOLOGY | Facility: CLINIC | Age: 58
End: 2023-11-29

## 2023-11-29 NOTE — TELEPHONE ENCOUNTER
"    Caller: Sandra Garza \"Aly\"    Relationship: Self    Best call back number: [phone]    What form or medical record are you requesting: CPAP FORM FOR PHYSICAL    Who is requesting this form or medical record from you: PT    How would you like to receive the form or medical records (pick-up, mail, fax): FAX  If fax, what is the fax number: [phone]  If mail, what is the address:   If pick-up, provide patient with address and location details    Timeframe paperwork needed: TODAY    Additional notes: PT NEEDS CPAP FORM FAXED TO [facility]. PT IS A  AND NEEDS FORM TO BE SENT OVER TODAY SO THAT HE CAN CONTINUE TO WORK.          "

## 2023-11-30 ENCOUNTER — TELEPHONE (OUTPATIENT)
Dept: PULMONOLOGY | Facility: CLINIC | Age: 58
End: 2023-11-30

## 2023-11-30 NOTE — TELEPHONE ENCOUNTER
Message sent to DME for compliance. Once I have the compliance ill inform the provider of the letter.

## 2023-11-30 NOTE — TELEPHONE ENCOUNTER
"Caller: Sandra Garza \"Aly\"    Relationship: Self    Best call back number: [phone]    What form or medical record are you requesting: CPAP COMPLIANCE    Who is requesting this form or medical record from you: PATIENT    How would you like to receive the form or medical records (pick-up, mail, fax):   If fax, what is the fax number: [phone]    Timeframe paperwork needed: ASAP    Additional notes:  PT NEEDS CPAP FORM FAXED TO [facility]. PT IS A  AND NEEDS FORM TO BE SENT OVER TODAY SO THAT HE CAN CONTINUE TO WORK. HE STATES IF YOU WANT TO EMAIL THE PAPERWORK YOU CAN EMAIL IT TO [email or web address].      "

## 2023-12-04 ENCOUNTER — TELEPHONE (OUTPATIENT)
Dept: PULMONOLOGY | Facility: CLINIC | Age: 58
End: 2023-12-04
Payer: COMMERCIAL

## 2023-12-04 NOTE — TELEPHONE ENCOUNTER
Spoke with wife. She is to have him angelique us back.    We are unable to give a compliance letter. Pt is not compliace based on last download.

## 2024-04-11 ENCOUNTER — OFFICE VISIT (OUTPATIENT)
Dept: BEHAVIORAL HEALTH | Facility: CLINIC | Age: 59
End: 2024-04-11
Payer: COMMERCIAL

## 2024-04-11 ENCOUNTER — DOCUMENTATION (OUTPATIENT)
Dept: BARIATRICS/WEIGHT MGMT | Facility: CLINIC | Age: 59
End: 2024-04-11
Payer: COMMERCIAL

## 2024-04-11 ENCOUNTER — OFFICE VISIT (OUTPATIENT)
Dept: BARIATRICS/WEIGHT MGMT | Facility: CLINIC | Age: 59
End: 2024-04-11
Payer: COMMERCIAL

## 2024-04-11 VITALS
BODY MASS INDEX: 49.44 KG/M2 | HEART RATE: 91 BPM | OXYGEN SATURATION: 97 % | HEIGHT: 67 IN | SYSTOLIC BLOOD PRESSURE: 144 MMHG | DIASTOLIC BLOOD PRESSURE: 86 MMHG | TEMPERATURE: 97.1 F | RESPIRATION RATE: 20 BRPM | WEIGHT: 315 LBS

## 2024-04-11 DIAGNOSIS — Z71.89 ENCOUNTER FOR PSYCHOLOGICAL ASSESSMENT PRIOR TO BARIATRIC SURGERY: ICD-10-CM

## 2024-04-11 DIAGNOSIS — Z74.09 IMPAIRED MOBILITY: ICD-10-CM

## 2024-04-11 DIAGNOSIS — G47.30 SLEEP APNEA, UNSPECIFIED TYPE: ICD-10-CM

## 2024-04-11 DIAGNOSIS — G89.29 OTHER CHRONIC PAIN: ICD-10-CM

## 2024-04-11 DIAGNOSIS — E11.69 TYPE 2 DIABETES MELLITUS WITH OBESITY: ICD-10-CM

## 2024-04-11 DIAGNOSIS — I10 ESSENTIAL HYPERTENSION: ICD-10-CM

## 2024-04-11 DIAGNOSIS — E66.9 TYPE 2 DIABETES MELLITUS WITH OBESITY: ICD-10-CM

## 2024-04-11 DIAGNOSIS — E66.01 MORBID OBESITY WITH BMI OF 50.0-59.9, ADULT: Primary | ICD-10-CM

## 2024-04-11 DIAGNOSIS — R12 HEARTBURN: ICD-10-CM

## 2024-04-11 DIAGNOSIS — R06.09 DYSPNEA ON EXERTION: ICD-10-CM

## 2024-04-11 PROCEDURE — 90791 PSYCH DIAGNOSTIC EVALUATION: CPT | Performed by: PSYCHOLOGIST

## 2024-04-11 RX ORDER — SODIUM CHLORIDE 9 MG/ML
40 INJECTION, SOLUTION INTRAVENOUS AS NEEDED
OUTPATIENT
Start: 2024-04-11

## 2024-04-11 RX ORDER — SODIUM CHLORIDE 9 MG/ML
150 INJECTION, SOLUTION INTRAVENOUS CONTINUOUS
OUTPATIENT
Start: 2024-04-11

## 2024-04-11 RX ORDER — SODIUM CHLORIDE 0.9 % (FLUSH) 0.9 %
3 SYRINGE (ML) INJECTION EVERY 12 HOURS SCHEDULED
OUTPATIENT
Start: 2024-04-11

## 2024-04-11 RX ORDER — SODIUM CHLORIDE 0.9 % (FLUSH) 0.9 %
3-10 SYRINGE (ML) INJECTION AS NEEDED
OUTPATIENT
Start: 2024-04-11

## 2024-04-11 NOTE — PROGRESS NOTES
[facility]  [address]    [facility] [id number] [phone]      Patient  Name:  Sandra Garza  :  1965      Date of Visit: 2024      Chief Complaint:  weight gain; unable to maintain weight loss      History of Present Illness:  Sandra Garza is a 58 y.o. male who presents today for evaluation, education and consultation regarding metabolic and bariatric surgery with Dr. Reeves.     Sandra has been overweight for at least 57 years, has been 35 pounds or more overweight for at least 45 years, has been 100 pounds or more overweight for 15 or more years and started dieting at age 16.  Previous diet attempts include: Low Carbohydrate and Nadeem's Diet.  The most weight Sandra lost was 60 pounds w/ portion control but was unable to maintain that weight loss.  His maximum lifetime weight is 351 pounds.       Complete history has been obtained and discussed today, as pertinent to metabolic/ bariatric surgery.     Past Medical History:   Diagnosis Date    Arthritis     (R) hip, on Diclofenac + Norco    Aspirin long-term use     for routine prevention    BPH (benign prostatic hyperplasia)     Cataract, bilateral     Chronic bronchitis     prn inhaler/steroid use, does not follow w/ pulmonary    Dyspepsia     Dyspnea on exertion     Fatigue     Former tobacco use     quit dip , quit smoking     Heartburn     chronic/episodic, prn OTC PPIs, denies prior eval    History of being tatooed     Hyperlipidemia     Hypertension     Hypothyroidism     Impaired mobility     d/t chronic hip pain, ambulates w/ cane    Kidney stone     Morbid obesity with BMI of 50.0-59.9, adult     Seasonal allergies     Sleep apnea     noncompliant w/ device, cannot tolerate    Wears glasses     reading only     Past Surgical History:   Procedure Laterality Date    APPENDECTOMY OPEN      CATARACT EXTRACTION W/ INTRAOCULAR LENS IMPLANT Right 2018     "Procedure: CATARACT PHACO EXTRACTION WITH INTRAOCULAR LENS IMPLANT RIGHT;  Surgeon: Melquiades Camop MD;  Location: [facility] OR;  Service: Ophthalmology    CATARACT EXTRACTION W/ INTRAOCULAR LENS IMPLANT Left 08/09/2018    Procedure: CATARACT PHACO EXTRACTION WITH INTRAOCULAR LENS IMPLANT LEFT;  Surgeon: Melquiades Campo MD;  Location: [facility] OR;  Service: Ophthalmology    COLONOSCOPY N/A 07/28/2021    Procedure: COLONOSCOPY WITH POLYPECTOMY;  Surgeon: Jimmy Donohue MD;  Location: [facility] ENDOSCOPY;  Service: Gastroenterology;  Laterality: N/A;    EAR TUBES  1978    INCISION AND DRAINAGE ABSCESS  2008    \"INGROWN HAIR\" LEFT PUBIC AREA, required packing, hospitalized w/ IV abx 5 days.    TONSILLECTOMY AND ADENOIDECTOMY  1972       Allergies   Allergen Reactions    Sulfa Antibiotics Nausea And Vomiting       Current Outpatient Medications:     acetaminophen (TYLENOL) 500 MG tablet, Take 2 tablets by mouth Every 6 (Six) Hours As Needed for Mild Pain., Disp: , Rfl:     aspirin  MG tablet, Take 1 tablet by mouth Daily., Disp: , Rfl:     atorvastatin (LIPITOR) 20 MG tablet, Take 1 tablet by mouth Daily., Disp: , Rfl:     diclofenac-miSOPROStol (ARTHROTEC 75) 75-0.2 MG EC tablet, Take 1 tablet by mouth 2 (Two) Times a Day., Disp: , Rfl:     finasteride (PROSCAR) 5 MG tablet, Take 1 tablet by mouth Daily., Disp: , Rfl:     fluticasone (FLONASE) 50 MCG/ACT nasal spray, 2 sprays into the nostril(s) as directed by provider Daily., Disp: , Rfl:     HYDROcodone-acetaminophen (NORCO) 5-325 MG per tablet, Take 1 tablet by mouth Every 6 (Six) Hours As Needed., Disp: , Rfl:     levothyroxine (SYNTHROID, LEVOTHROID) 112 MCG tablet, Take 1 tablet by mouth Daily., Disp: , Rfl:     meclizine (ANTIVERT) 25 MG tablet, Take 1 tablet by mouth 3 (Three) Times a Day As Needed for Dizziness., Disp: , Rfl:     naproxen (NAPROSYN) 500 MG tablet, Take 1 tablet by mouth 2 (Two) Times a Day With Meals., Disp: , Rfl:     spironolactone (ALDACTONE) 25 " MG tablet, Take 1 tablet by mouth Daily., Disp: , Rfl:     tamsulosin (FLOMAX) 0.4 MG capsule 24 hr capsule, Take 1 capsule by mouth Daily., Disp: , Rfl:     Social History     Socioeconomic History    Marital status:    Tobacco Use    Smoking status: Former     Current packs/day: 0.00     Average packs/day: 0.5 packs/day for 28.0 years (14.0 ttl pk-yrs)     Types: Cigarettes     Start date: 7/25/1980     Quit date: 7/25/2008     Years since quitting: 15.7    Smokeless tobacco: Former     Quit date: 2023   Vaping Use    Vaping status: Never Used   Substance and Sexual Activity    Alcohol use: Yes     Comment: occ.    Drug use: No    Sexual activity: Defer     Social History     Social History Narrative    Lives in [place], [place].   with 3 adult children.  Works [employer] - .       Family History   Problem Relation Age of Onset    Hypertension Mother     Heart attack Mother     Heart disease Mother     Heart attack Father     Heart disease Father     COPD Father     Hypertension Maternal Grandmother     Asthma Maternal Grandmother     Hypertension Maternal Grandfather     Lung disease Maternal Grandfather         black lung    Obesity Paternal Grandfather     Hypertension Paternal Grandfather     Heart attack Paternal Grandfather     Heart disease Paternal Grandfather        Review of Systems:  Constitutional:  reports fatigue, weight gain and denies fevers, chills.  HEENT:  denies headache, ear pain or loss of hearing, blurred or double vision, nasal discharge or sore throat.  Cardiovascular:  reports HTN, HLD and denies hx heart disease, chest pain, hx DVT.  Respiratory:  reports shortness of breath , sleep apnea and denies cough , wheezing, asthma, COPD, hx PE.  Gastrointestinal:  reports heartburn and denies dysphagia, nausea, vomiting, abdominal pain, IBS, gallbladder issues, liver disease.  Genitourinary:   denies hematuria, dysuria, polyuria, polydipsia, renal  insufficiency.    Musculoskeletal:  reports joint pain, back pain, arthritis and denies autoimmune disease.  Neurological:  denies seizure, stroke.  Psychiatric:  denies depressed mood, feeling anxious, bipolar disorder.  Endocrine:  reports thyroid disease and denies diabetes.  Hematologic:   denies bruising, bleeding disorder, hx anemia, hx blood transfusion.  Skin:   denies rashes, hx MRSA.    Physical Exam:  Vital Signs:  Weight: (!) 157 kg (345 lb 9.6 oz)   Body mass index is 54.13 kg/m².  Temp: 97.1 °F (36.2 °C)   Heart Rate: 91   BP: 144/86     Physical Exam  Vitals reviewed.   Constitutional:       Appearance: He is well-developed.   HENT:      Head: Normocephalic and atraumatic.   Eyes:      General: No scleral icterus.     Conjunctiva/sclera: Conjunctivae normal.   Neck:      Thyroid: No thyromegaly.   Cardiovascular:      Rate and Rhythm: Normal rate and regular rhythm.      Heart sounds: No murmur heard.  Pulmonary:      Effort: Pulmonary effort is normal.      Breath sounds: Rhonchi present. No wheezing or rales.   Abdominal:      General: Bowel sounds are normal. There is no distension.      Palpations: Abdomen is soft. There is no mass.      Tenderness: There is no abdominal tenderness.      Comments: RLQ open appy scar, umbilical hernia noted   Musculoskeletal:         General: Normal range of motion.      Cervical back: Neck supple.   Skin:     General: Skin is warm and dry.      Findings: No rash.   Neurological:      Mental Status: He is alert and oriented to person, place, and time.      Gait: Gait normal.   Psychiatric:         Judgment: Judgment normal.         Patient Active Problem List   Diagnosis    Screening for colon cancer    Sleep apnea    Morbid obesity with BMI of 50.0-59.9, adult    Fatigue    Dyspepsia    Hypothyroidism    Hyperlipidemia    BPH (benign prostatic hyperplasia)    Seasonal allergies    Hypertension    Arthritis    Aspirin long-term use    Dyspnea on exertion    Former  tobacco use    Chronic bronchitis    Heartburn    Impaired mobility    Kidney stone       Assessment:  58 y.o. male with medically complicated obesity pursuing sleeve gastrectomy.    Metabolic & Bariatric Surgery is deemed medically necessary given the following: Class 3 Severe Obesity (BMI >=40). Obesity-related health conditions include the following: obstructive sleep apnea, hypertension, dyslipidemias, osteoarthritis, and heartburn . Obesity is worsening. BMI is is above average; BMI management plan is completed. We discussed consulting a Bariatric surgeon.        Plan:  Further evaluation will include: CBC, CMP, Lipids, TSH, HgA1C, H.Pylori UBT, and EGD (pending cardiac/pulmonary clearance).    Additional clearances needed prior to surgery will include: Cardiology and Pulmonary.     Patient understands that bariatric surgery is not cosmetic surgery but rather a tool to help make a lifelong commitment to lifestyle changes including diet, exercise and behavior modifications.  The patient has been educated today on those expected postoperative lifestyle changes.  Psychological and Nutritional consultations will be completed prior to surgery.  Instructions on how to access [employer] (an internet based site w/ educational surgical videos) were given to the patient.  Recommended perioperative vitamin supplementation was reviewed.  The importance of avoiding ASA/ NSAIDS/ steroids/ tobacco/nicotine/ hormones/ immunomodulators perioperatively was discussed in detail.  All questions/concerns have been addressed.      Further input to follow pending the above.           JORGE Brito

## 2024-04-11 NOTE — PROGRESS NOTES
PROGRESS NOTE    Data:    Sanrda Garza is a 58 y.o. male who met with the undersigned for a scheduled psychological evaluation from 11:15 - 12:00 pm.      Clinical Maneuvering/Intervention:      Chief complaint and history of presenting illness/Problems: struggling with obesity for several years. Despite trying different weight loss plans and diets, the pt reported being unsuccessful in losing weight. A psychological evaluation was conducted in order to assess past and current level of functioning. Areas assessed included, but were not limited to: perception of social support, perception of ability to face and deal with challenges in life (positive functioning), anxiety symptoms, depressive symptoms, perspective on beliefs/belief system, coping skills for stress, intelligence level, addiction issues, etc. Therapeutic rapport was established. Interventions conducted today were geared towards assessing the pt's readiness for weight loss surgery and identifying and psychological contraindications for undergoing such a major life change. Social support was deemed strong (specific to weight loss surgery/weight loss in this manner and in a general sense): mother, siblings, friends, co-workers, boss. Current psychological struggles were described as low, but included: depression about being overweight, chronic pain, and mobility problems. Coping skills for distress and related to undergoing a major life change such as weight loss surgery/weight loss were deemed strong and included tendency to be a positive person, good sense of humor, follows directions well, responsible person, maintains quality relationships with others, and believes in himself that he will be successful with weight loss surgery. The pt endorsed having characteristics of readiness to undergo major life changes inherent in the journey of weight loss surgery. The pt could speak to the detailed process of becoming ready mentally for this major life  change, having 'suffered' enough, and that he feels good about his decision to have weight loss surgery. The pt expressed gratitude for today's visit.     Past Family and Social History:      History of family mental health problems: brother (substance abuse)    Psychosocial history: treatment of psychiatric care in the past (N/A), alcohol/substance abuse treatment in the past (N/A) , alcohol/substance abuse problems (N/A), inpatient psychiatric care (N/A).    Mental Status Exam (MSE):  Hygiene:  good  Dress: normal  Attitude:  cooperative and proactive  Motor Activity: normal  Speech: pressured  Mood:   slightly nervous  Affect:  congruent  Thought Processes: normal  Thought Content:  normal  Suicidal Thoughts:  not endorsed  Homicidal Thoughts:  not endorsed  Crisis Safety Plan: not needed   Hallucinations:  none      Patient's Support Network Includes:  family, friends      Progress toward goal: there is evidence to suggest that he is taking measures to improve the quality of his life including seeking weight loss surgery.       Functional Status: moderate to high      Prognosis: good with weight loss surgery    Evaluation, Diagnoses, and Ability/Capacity to Respond to Treatment:      The pt presented to be struggling with depression, mainly due to being overweight (BMI = 54.13, morbid obesity), chronic pain, and his impaired mobility (he relies on a cane and exhibits a slow gait). Results of MSE demonstrated a functional status of moderate to high. Strengths: belief in self that he will be successful with weight loss surgery, etc (see detailed list of coping skills above). Needed for growth (CPT code requirement for Weaknesses): weight loss.      From a psychological standpoint, the pt presents as a good candidate for bariatric surgery. He is motivated for the surgery, has showed readiness for the lifestyle change in terms of starting to adjust his eating habits, and seems to have appropriate expectations of how  to prepare and how to live after surgery in order to lose weight successfully.    Treatment Plan:      Short term goals: Start improving his health by following up with his bariatric surgeon in order to receive weight loss surgery as soon as feasible/appropriate and demonstrate success with compliance to adhering to the recommended diet. Long term goals: reach a healthy weight and experience alleviation of depression/alleviation of pain/improved mobility via taking control of his health.     Jessenia Jiménez, PhD, LP

## 2024-04-11 NOTE — PROGRESS NOTES
"Weight Loss Surgery  Presurgical Nutrition Assessment     Sandra Garza  04/11/2024  [id number]  [id number]  1965   male    Surgery desired: LSG (sleeve gastrectomy)     HEIGHT 170.2 cm (67\")   WEIGHT 157 kg (345.5 #)   BMI 54.13    Highest weight ever: 159.5 kg (351 #)      Allergies   Allergen Reactions    Sulfa Antibiotics Nausea And Vomiting       Current Outpatient Medications:     acetaminophen (TYLENOL) 500 MG tablet, Take 2 tablets by mouth Every 6 (Six) Hours As Needed for Mild Pain., Disp: , Rfl:     aspirin  MG tablet, Take 1 tablet by mouth Daily., Disp: , Rfl:     atorvastatin (LIPITOR) 20 MG tablet, Take 1 tablet by mouth Daily., Disp: , Rfl:     diclofenac-miSOPROStol (ARTHROTEC 75) 75-0.2 MG EC tablet, Take 1 tablet by mouth 2 (Two) Times a Day., Disp: , Rfl:     finasteride (PROSCAR) 5 MG tablet, Take 1 tablet by mouth Daily., Disp: , Rfl:     fluticasone (FLONASE) 50 MCG/ACT nasal spray, 2 sprays into the nostril(s) as directed by provider Daily., Disp: , Rfl:     HYDROcodone-acetaminophen (NORCO) 5-325 MG per tablet, Take 1 tablet by mouth Every 6 (Six) Hours As Needed., Disp: , Rfl:     levothyroxine (SYNTHROID, LEVOTHROID) 112 MCG tablet, Take 1 tablet by mouth Daily., Disp: , Rfl:     meclizine (ANTIVERT) 25 MG tablet, Take 1 tablet by mouth 3 (Three) Times a Day As Needed for Dizziness., Disp: , Rfl:     naproxen (NAPROSYN) 500 MG tablet, Take 1 tablet by mouth 2 (Two) Times a Day With Meals., Disp: , Rfl:     spironolactone (ALDACTONE) 25 MG tablet, Take 1 tablet by mouth Daily., Disp: , Rfl:     tamsulosin (FLOMAX) 0.4 MG capsule 24 hr capsule, Take 1 capsule by mouth Daily., Disp: , Rfl:       Subjective information: Met with patient and his mother for nutrition consult. Patient states that both his wife and mother do most of the meal preparation. He also stated that the three of them are very familiar with counting carbohydrate grams due to the fact that his grown " daughter had been diagnosed at 9 years with type 1 DM.  As a result of helping her, he had learned and capably stated basics of a lower carbohydrate higher protein diet for diabetes, as well as for weight management.  Patient states and food record confirms that eating large and multiple servings is a primary issue.  Also, eating too fast, he states.    Nutrition Recall   Example of Usual 24 hour intake:     Breakfast: 4 slices quiros, 2 pieces each sausage and toast, OR 2 biscuits /c gravy with meal. Also, 2 to 3 cups coffee with cream and sugar + 1 small glass juice and 1 regular Coke  (fountain if out, can or bottle diet Coke if at home).    Lunch: bologna, ham or turkey sandwich in a packed lunch with a handful of Doritos or Lays chips OR, if at home for lunch, 2 homemade cheese burgers with fried potatoes.     Dinner: fried chicken OR pork chops OR fish OR BBQ chicken with mac'n cheese, green beans OR baked beans AND/OR broccoli, maxx greens, with cornbread OR 2 homemade cheese burgers OR 3 chili dogs with fried taters.    Snacks: 2 handfuls Doritos, microwave popcorn, OR chocolate candy bar OR a bowl of cereal (corn flakes, leo charms, or derrick crispies with milk.  OR any Little Cleo's cakes.     Beverages of Choice: regular or diet Coke, juice, coffee with cream and sugar. No sweet tea stated.    Food Allergies or Intolerances: none stated or recorded          Exercise / Activity: able to walk at most 1/4 mile, patient states. No planned personal activity.  Inactive on the job.       Assessment of Nutritional Adequacy, Excessive Intake or Deficiencies:        Protein intake is adequate  to ensure successful weight loss. Due to large and multiple servings.                                        Processed / simple Carbohydrate intake is: very excessive - sweet snacks, chips, etc                                       Balance of diet with a variety of fruits and vegetables is: not good, as recorded. 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                                                        Reliance on restaurant food including fast food is: high - fast food 1 to 2 times each day, primarily at lunch:  fried chicken, large or double burger with fries, pizza from different Italian restaurants - 1 portion with an appetizer.                                                                           Ingestion of sweet beverages eg soda, sweet tea, fruit juice: excessive amounts of diet and regular Coke.      Education    Provided Nutrition Guidelines for Bariatric and Metabolic Surgery   Reviewed guidelines for higher protein, limited carbohydrate diet to promote weight loss.  Encouraged patient to incorporate these principles of healthy eating.    Educated patient to wisely choose an appropriate protein supplement beverage for the post-surgery liquid diet.  Provided product guidelines and examples.    Explained importance of goal setting to help in changing eating behaviors that are not conducive to weight loss.  Specific macronutrient goals as below.   Provided follow-up options for support, including contact information for dietitians here.     Discussed importance of tracking grams of protein and carbohydrate in diet.  Web-based support information and apps for smart phones and computers given.        Nutrition Goals   Protein goal:  grams per day in three regular balanced meals and two to three high protein snacks each day, to ensure desired weight loss.   Carbohydrate goal:  100-140 grams per day  Beverage goal: Appropriate non-carbonated beverage intake.  Patient to wean self off of any sweet beverages, including soda.    Exercise Goals  Continue current exercise routine, if appropriate, and obtain approval from caregiver if physically limited for any reason.   Start activity plan per PCP/specialist advice if not currently exercising.     Recommend that team proceed with  surgery and follow per protocol.   Dayana Dawkins, RACH  04/11/2024  11:56 EDT

## 2024-04-13 LAB
ALBUMIN SERPL-MCNC: 4.6 G/DL (ref 3.8–4.9)
ALBUMIN/GLOB SERPL: 1.5 {RATIO} (ref 1.2–2.2)
ALP SERPL-CCNC: 67 IU/L (ref 44–121)
ALT SERPL-CCNC: 40 IU/L (ref 0–44)
AST SERPL-CCNC: 30 IU/L (ref 0–40)
BASOPHILS # BLD AUTO: 0.1 X10E3/UL (ref 0–0.2)
BASOPHILS NFR BLD AUTO: 1 %
BILIRUB SERPL-MCNC: 0.4 MG/DL (ref 0–1.2)
BUN SERPL-MCNC: 17 MG/DL (ref 6–24)
BUN/CREAT SERPL: 17 (ref 9–20)
CALCIUM SERPL-MCNC: 9.7 MG/DL (ref 8.7–10.2)
CHLORIDE SERPL-SCNC: 100 MMOL/L (ref 96–106)
CHOLEST SERPL-MCNC: 165 MG/DL (ref 100–199)
CO2 SERPL-SCNC: 24 MMOL/L (ref 20–29)
CREAT SERPL-MCNC: 1.01 MG/DL (ref 0.76–1.27)
EGFRCR SERPLBLD CKD-EPI 2021: 87 ML/MIN/1.73
EOSINOPHIL # BLD AUTO: 0.5 X10E3/UL (ref 0–0.4)
EOSINOPHIL NFR BLD AUTO: 5 %
ERYTHROCYTE [DISTWIDTH] IN BLOOD BY AUTOMATED COUNT: 13.3 % (ref 11.6–15.4)
GLOBULIN SER CALC-MCNC: 3.1 G/DL (ref 1.5–4.5)
GLUCOSE SERPL-MCNC: 95 MG/DL (ref 70–99)
HBA1C MFR BLD: 6.5 % (ref 4.8–5.6)
HCT VFR BLD AUTO: 45.5 % (ref 37.5–51)
HDLC SERPL-MCNC: 36 MG/DL
HGB BLD-MCNC: 14.9 G/DL (ref 13–17.7)
IMM GRANULOCYTES # BLD AUTO: 0.1 X10E3/UL (ref 0–0.1)
IMM GRANULOCYTES NFR BLD AUTO: 1 %
LDLC SERPL CALC-MCNC: 86 MG/DL (ref 0–99)
LYMPHOCYTES # BLD AUTO: 3.1 X10E3/UL (ref 0.7–3.1)
LYMPHOCYTES NFR BLD AUTO: 27 %
MCH RBC QN AUTO: 29.6 PG (ref 26.6–33)
MCHC RBC AUTO-ENTMCNC: 32.7 G/DL (ref 31.5–35.7)
MCV RBC AUTO: 91 FL (ref 79–97)
MONOCYTES # BLD AUTO: 0.9 X10E3/UL (ref 0.1–0.9)
MONOCYTES NFR BLD AUTO: 8 %
NEUTROPHILS # BLD AUTO: 6.9 X10E3/UL (ref 1.4–7)
NEUTROPHILS NFR BLD AUTO: 58 %
PLATELET # BLD AUTO: 286 X10E3/UL (ref 150–450)
POTASSIUM SERPL-SCNC: 4.7 MMOL/L (ref 3.5–5.2)
PROT SERPL-MCNC: 7.7 G/DL (ref 6–8.5)
RBC # BLD AUTO: 5.03 X10E6/UL (ref 4.14–5.8)
SODIUM SERPL-SCNC: 140 MMOL/L (ref 134–144)
TRIGL SERPL-MCNC: 258 MG/DL (ref 0–149)
TSH SERPL DL<=0.005 MIU/L-ACNC: 13.9 UIU/ML (ref 0.45–4.5)
UREA BREATH TEST QL: NEGATIVE
VLDLC SERPL CALC-MCNC: 43 MG/DL (ref 5–40)
WBC # BLD AUTO: 11.6 X10E3/UL (ref 3.4–10.8)

## 2024-04-29 ENCOUNTER — OFFICE VISIT (OUTPATIENT)
Dept: CARDIOLOGY | Facility: CLINIC | Age: 59
End: 2024-04-29
Payer: COMMERCIAL

## 2024-04-29 VITALS
DIASTOLIC BLOOD PRESSURE: 82 MMHG | OXYGEN SATURATION: 95 % | HEART RATE: 74 BPM | BODY MASS INDEX: 49.44 KG/M2 | SYSTOLIC BLOOD PRESSURE: 150 MMHG | WEIGHT: 315 LBS | HEIGHT: 67 IN

## 2024-04-29 DIAGNOSIS — E03.9 HYPOTHYROIDISM, UNSPECIFIED TYPE: ICD-10-CM

## 2024-04-29 DIAGNOSIS — E78.2 MIXED HYPERLIPIDEMIA: Primary | ICD-10-CM

## 2024-04-29 DIAGNOSIS — E66.01 MORBID OBESITY WITH BMI OF 50.0-59.9, ADULT: ICD-10-CM

## 2024-04-29 DIAGNOSIS — R06.09 DYSPNEA ON EXERTION: ICD-10-CM

## 2024-04-29 DIAGNOSIS — R94.31 ABNORMAL EKG: ICD-10-CM

## 2024-04-29 DIAGNOSIS — Z01.818 PREOPERATIVE CLEARANCE: ICD-10-CM

## 2024-04-29 DIAGNOSIS — I10 PRIMARY HYPERTENSION: ICD-10-CM

## 2024-04-29 PROCEDURE — 93000 ELECTROCARDIOGRAM COMPLETE: CPT | Performed by: INTERNAL MEDICINE

## 2024-04-29 PROCEDURE — 99204 OFFICE O/P NEW MOD 45 MIN: CPT | Performed by: INTERNAL MEDICINE

## 2024-04-29 RX ORDER — CYCLOBENZAPRINE HCL 10 MG
10 TABLET ORAL 2 TIMES DAILY PRN
COMMUNITY
Start: 2024-04-23

## 2024-04-29 RX ORDER — LOSARTAN POTASSIUM 100 MG/1
100 TABLET ORAL DAILY
COMMUNITY
Start: 2024-04-15

## 2024-04-29 RX ORDER — GLIMEPIRIDE 1 MG/1
1 TABLET ORAL
COMMUNITY
Start: 2024-04-23

## 2024-04-29 RX ORDER — DICLOFENAC SODIUM 75 MG/1
75 TABLET, DELAYED RELEASE ORAL 2 TIMES DAILY
COMMUNITY
Start: 2024-04-22

## 2024-04-29 NOTE — PROGRESS NOTES
"Subjective   Chief Complaint   Patient presents with    Surgical Clearance       History of Present Illness  Patient is 58 years old white male who is planning to have bariatric surgery and is here for preop cardiac clearance.    Patient has history of hypertension, hyperlipidemia, diabetes mellitus and has abnormal EKG showing incomplete right bundle branch block.    He complains of fatigue, dyspnea on exertion and heartburn.  He carries a diagnosis of sleep apnea also and has prior history of tobacco use.    No known coronary artery disease    Hyperlipidemia on Lipitor therapy.  Diabetes mellitus on Amaryl.  Hypertension on losartan 100 mg daily and Aldactone.    Hypothyroidism on Synthroid 112 mcg daily.    Past Surgical History:   Procedure Laterality Date    APPENDECTOMY OPEN  1976    CATARACT EXTRACTION W/ INTRAOCULAR LENS IMPLANT Right 07/26/2018    Procedure: CATARACT PHACO EXTRACTION WITH INTRAOCULAR LENS IMPLANT RIGHT;  Surgeon: Melquiades Campo MD;  Location: [facility] OR;  Service: Ophthalmology    CATARACT EXTRACTION W/ INTRAOCULAR LENS IMPLANT Left 08/09/2018    Procedure: CATARACT PHACO EXTRACTION WITH INTRAOCULAR LENS IMPLANT LEFT;  Surgeon: Melquiades Campo MD;  Location: [facility] OR;  Service: Ophthalmology    COLONOSCOPY N/A 07/28/2021    Procedure: COLONOSCOPY WITH POLYPECTOMY;  Surgeon: Jimmy Donohue MD;  Location: [facility] ENDOSCOPY;  Service: Gastroenterology;  Laterality: N/A;    EAR TUBES  1978    INCISION AND DRAINAGE ABSCESS  2008    \"INGROWN HAIR\" LEFT PUBIC AREA, required packing, hospitalized w/ IV abx 5 days.    TONSILLECTOMY AND ADENOIDECTOMY  1972     Family History   Problem Relation Age of Onset    Hypertension Mother     Heart attack Mother     Heart disease Mother     Heart attack Father     Heart disease Father     COPD Father     Hypertension Maternal Grandmother     Asthma Maternal Grandmother     Hypertension Maternal Grandfather     Lung disease Maternal Grandfather         black lung    " Obesity Paternal Grandfather     Hypertension Paternal Grandfather     Heart attack Paternal Grandfather     Heart disease Paternal Grandfather      Past Medical History:   Diagnosis Date    Arthritis     (R) hip, on Diclofenac + Norco    Aspirin long-term use     for routine prevention    BPH (benign prostatic hyperplasia)     Cataract, bilateral     Chronic bronchitis     prn inhaler/steroid use, does not follow w/ pulmonary    Dyspepsia     Dyspnea on exertion     Fatigue     Former tobacco use     quit dip 2023, quit smoking 2012    Heartburn     chronic/episodic, prn OTC PPIs, denies prior eval    History of being tatooed     Hyperlipidemia     Hypertension     Hypothyroidism     Impaired mobility     d/t chronic hip pain, ambulates w/ cane    Kidney stone     Morbid obesity with BMI of 50.0-59.9, adult     Seasonal allergies     Sleep apnea     noncompliant w/ device, cannot tolerate    Wears glasses     reading only       Patient Active Problem List   Diagnosis    Screening for colon cancer    Sleep apnea    Morbid obesity with BMI of 50.0-59.9, adult    Fatigue    Dyspepsia    Hypothyroidism    Hyperlipidemia    BPH (benign prostatic hyperplasia)    Seasonal allergies    Hypertension    Arthritis    Aspirin long-term use    Dyspnea on exertion    Former tobacco use    Chronic bronchitis    Heartburn    Impaired mobility    Kidney stone    Abnormal EKG,incomplete RBBB    Preoperative clearance         Social History     Tobacco Use    Smoking status: Former     Current packs/day: 0.00     Average packs/day: 0.5 packs/day for 28.0 years (14.0 ttl pk-yrs)     Types: Cigarettes     Start date: 7/25/1980     Quit date: 7/25/2008     Years since quitting: 15.7     Passive exposure: Past    Smokeless tobacco: Former     Types: Snuff     Quit date: 2023   Vaping Use    Vaping status: Never Used   Substance Use Topics    Alcohol use: Yes     Comment: occ.    Drug use: No         The following portions of the  patient's history were reviewed and updated as appropriate: allergies, current medications, past family history, past medical history, past social history, past surgical history and problem list.    Allergies   Allergen Reactions    Sulfa Antibiotics Nausea And Vomiting         Current Outpatient Medications:     aspirin  MG tablet, Take 1 tablet by mouth Daily., Disp: , Rfl:     atorvastatin (LIPITOR) 20 MG tablet, Take 1 tablet by mouth Daily., Disp: , Rfl:     cyclobenzaprine (FLEXERIL) 10 MG tablet, Take 1 tablet by mouth 2 (Two) Times a Day As Needed for Muscle Spasms., Disp: , Rfl:     diclofenac (VOLTAREN) 75 MG EC tablet, Take 1 tablet by mouth 2 (Two) Times a Day., Disp: , Rfl:     diclofenac-miSOPROStol (ARTHROTEC 75) 75-0.2 MG EC tablet, Take 1 tablet by mouth 2 (Two) Times a Day., Disp: , Rfl:     fluticasone (FLONASE) 50 MCG/ACT nasal spray, 2 sprays into the nostril(s) as directed by provider Daily., Disp: , Rfl:     glimepiride (AMARYL) 1 MG tablet, Take 1 tablet by mouth Every Morning Before Breakfast., Disp: , Rfl:     HYDROcodone-acetaminophen (NORCO) 5-325 MG per tablet, Take 1 tablet by mouth Every 6 (Six) Hours As Needed., Disp: , Rfl:     levothyroxine (SYNTHROID, LEVOTHROID) 112 MCG tablet, Take 1 tablet by mouth Daily., Disp: , Rfl:     losartan (COZAAR) 100 MG tablet, Take 1 tablet by mouth Daily., Disp: , Rfl:     spironolactone (ALDACTONE) 25 MG tablet, Take 1 tablet by mouth Daily., Disp: , Rfl:     tamsulosin (FLOMAX) 0.4 MG capsule 24 hr capsule, Take 1 capsule by mouth Daily., Disp: , Rfl:     finasteride (PROSCAR) 5 MG tablet, Take 1 tablet by mouth Daily., Disp: , Rfl:     Review of Systems   Constitutional: Positive for malaise/fatigue.   HENT: Negative.  Negative for congestion.    Eyes: Negative.    Cardiovascular:  Positive for dyspnea on exertion. Negative for chest pain, cyanosis, irregular heartbeat, leg swelling, near-syncope, orthopnea, palpitations, paroxysmal  "nocturnal dyspnea and syncope.   Respiratory:  Positive for shortness of breath.    Hematologic/Lymphatic: Negative.    Musculoskeletal: Negative.    Gastrointestinal: Negative.    Neurological: Negative.  Negative for headaches.        Objective      /82 (BP Location: Left arm, Patient Position: Sitting, Cuff Size: Large Adult)   Pulse 74   Ht 170.2 cm (67\")   Wt (!) 157 kg (347 lb 3.2 oz)   SpO2 95%   BMI 54.38 kg/m²     Pulmonary:      Effort: Pulmonary effort is normal.      Breath sounds: Normal breath sounds. No stridor. No wheezing. No rhonchi. No rales.   Cardiovascular:      PMI at left midclavicular line. Normal rate. Regular rhythm. Normal S1. Normal S2.       Murmurs: There is no murmur.      No gallop.  No click. No rub.   Pulses:     Intact distal pulses.   Edema:     Peripheral edema absent.         Lab Review:    Lab Results   Component Value Date     04/11/2024    K 4.7 04/11/2024     04/11/2024    BUN 17 04/11/2024    CREATININE 1.01 04/11/2024    GLUCOSE 95 04/11/2024    CALCIUM 9.7 04/11/2024    ALT 40 04/11/2024    ALKPHOS 67 04/11/2024    LABIL2 1.5 04/11/2024     No results found for: \"CKTOTAL\"  Lab Results   Component Value Date    WBC 11.6 (H) 04/11/2024    HGB 14.9 04/11/2024    HCT 45.5 04/11/2024     04/11/2024     No results found for: \"INR\"  No results found for: \"MG\"  Lab Results   Component Value Date    CHLPL 165 04/11/2024    TRIG 258 (H) 04/11/2024    HDL 36 (L) 04/11/2024    VLDL 43 (H) 04/11/2024     No results found for: \"BNP\"    Procedures    I reviewed the patient's new clinical results.  I personally viewed and interpreted the patient's EKG/lab data        Assessment:   Diagnosis Plan   1. Mixed hyperlipidemia        2. Primary hypertension        3. Dyspnea on exertion  ECG 12 Lead    Adult Transthoracic Echo Complete W/ Cont if Necessary Per Protocol    Stress Test With Myocardial Perfusion One Day      4. Hypothyroidism, unspecified type      "   5. Morbid obesity with BMI of 50.0-59.9, adult        6. Abnormal EKG,incomplete RBBB  Stress Test With Myocardial Perfusion One Day      7. Preoperative clearance               Plan:  1. Mixed hyperlipidemia    2. Primary hypertension    3. Dyspnea on exertion    4. Hypothyroidism, unspecified type    5. Morbid obesity with BMI of 50.0-59.9, adult    6. Abnormal EKG,incomplete RBBB    7. Preoperative clearance        Patient is 58 years old white male who is planning to have gastric sleeve for weight loss surgery.  He has multiple risk factors for coronary artery disease including  Hypertension  Hyperlipidemia  Obesity  Diabetes mellitus  Prior history of tobacco use  Sleep apnea  Abnormal EKG    Further workup is indicated we will arrange Lexiscan stress test and echocardiogram.    Patient will be reevaluated after the studies.    Thank you for giving me the oppertunity to participate in your patient's cardiac care.    Sincerely,    OPAL Ken M.D. FACP FAC     No follow-ups on file.

## 2024-04-29 NOTE — LETTER
"April 30, 2024     Choco Mcmahon MD  [address] [facility] [place] [address]    Patient: Sandra Garza   YOB: 1965   Date of Visit: 4/29/2024       Dear Choco Mcmahon MD    Sandra Garza was in my office today. Below is a copy of my note.    If you have questions, please do not hesitate to call me. I look forward to following Sandra along with you.         Sincerely,        Brandie Ken MD        CC: JORGE Brito    Subjective  Chief Complaint   Patient presents with   • Surgical Clearance       History of Present Illness      Past Surgical History:   Procedure Laterality Date   • APPENDECTOMY OPEN  1976   • CATARACT EXTRACTION W/ INTRAOCULAR LENS IMPLANT Right 07/26/2018    Procedure: CATARACT PHACO EXTRACTION WITH INTRAOCULAR LENS IMPLANT RIGHT;  Surgeon: Melquiades Campo MD;  Location: [facility] OR;  Service: Ophthalmology   • CATARACT EXTRACTION W/ INTRAOCULAR LENS IMPLANT Left 08/09/2018    Procedure: CATARACT PHACO EXTRACTION WITH INTRAOCULAR LENS IMPLANT LEFT;  Surgeon: Melquiades Campo MD;  Location: [facility] OR;  Service: Ophthalmology   • COLONOSCOPY N/A 07/28/2021    Procedure: COLONOSCOPY WITH POLYPECTOMY;  Surgeon: Jimmy Donohue MD;  Location: [facility] ENDOSCOPY;  Service: Gastroenterology;  Laterality: N/A;   • EAR TUBES  1978   • INCISION AND DRAINAGE ABSCESS  2008    \"INGROWN HAIR\" LEFT PUBIC AREA, required packing, hospitalized w/ IV abx 5 days.   • TONSILLECTOMY AND ADENOIDECTOMY  1972     Family History   Problem Relation Age of Onset   • Hypertension Mother    • Heart attack Mother    • Heart disease Mother    • Heart attack Father    • Heart disease Father    • COPD Father    • Hypertension Maternal Grandmother    • Asthma Maternal Grandmother    • Hypertension Maternal Grandfather    • Lung disease Maternal Grandfather         black lung   • Obesity Paternal Grandfather    • Hypertension Paternal Grandfather    • Heart attack Paternal Grandfather    • Heart disease " Paternal Grandfather      Past Medical History:   Diagnosis Date   • Arthritis     (R) hip, on Diclofenac + Norco   • Aspirin long-term use     for routine prevention   • BPH (benign prostatic hyperplasia)    • Cataract, bilateral    • Chronic bronchitis     prn inhaler/steroid use, does not follow w/ pulmonary   • Dyspepsia    • Dyspnea on exertion    • Fatigue    • Former tobacco use     quit dip 2023, quit smoking 2012   • Heartburn     chronic/episodic, prn OTC PPIs, denies prior eval   • History of being tatooed    • Hyperlipidemia    • Hypertension    • Hypothyroidism    • Impaired mobility     d/t chronic hip pain, ambulates w/ cane   • Kidney stone    • Morbid obesity with BMI of 50.0-59.9, adult    • Seasonal allergies    • Sleep apnea     noncompliant w/ device, cannot tolerate   • Wears glasses     reading only       Patient Active Problem List   Diagnosis   • Screening for colon cancer   • Sleep apnea   • Morbid obesity with BMI of 50.0-59.9, adult   • Fatigue   • Dyspepsia   • Hypothyroidism   • Hyperlipidemia   • BPH (benign prostatic hyperplasia)   • Seasonal allergies   • Hypertension   • Arthritis   • Aspirin long-term use   • Dyspnea on exertion   • Former tobacco use   • Chronic bronchitis   • Heartburn   • Impaired mobility   • Kidney stone         Social History     Tobacco Use   • Smoking status: Former     Current packs/day: 0.00     Average packs/day: 0.5 packs/day for 28.0 years (14.0 ttl pk-yrs)     Types: Cigarettes     Start date: 7/25/1980     Quit date: 7/25/2008     Years since quitting: 15.7     Passive exposure: Past   • Smokeless tobacco: Former     Types: Snuff     Quit date: 2023   Vaping Use   • Vaping status: Never Used   Substance Use Topics   • Alcohol use: Yes     Comment: occ.   • Drug use: No         The following portions of the patient's history were reviewed and updated as appropriate: allergies, current medications, past family history, past medical history, past social  "history, past surgical history and problem list.    Allergies   Allergen Reactions   • Sulfa Antibiotics Nausea And Vomiting         Current Outpatient Medications:   •  aspirin  MG tablet, Take 1 tablet by mouth Daily., Disp: , Rfl:   •  atorvastatin (LIPITOR) 20 MG tablet, Take 1 tablet by mouth Daily., Disp: , Rfl:   •  cyclobenzaprine (FLEXERIL) 10 MG tablet, Take 1 tablet by mouth 2 (Two) Times a Day As Needed for Muscle Spasms., Disp: , Rfl:   •  diclofenac (VOLTAREN) 75 MG EC tablet, Take 1 tablet by mouth 2 (Two) Times a Day., Disp: , Rfl:   •  diclofenac-miSOPROStol (ARTHROTEC 75) 75-0.2 MG EC tablet, Take 1 tablet by mouth 2 (Two) Times a Day., Disp: , Rfl:   •  fluticasone (FLONASE) 50 MCG/ACT nasal spray, 2 sprays into the nostril(s) as directed by provider Daily., Disp: , Rfl:   •  glimepiride (AMARYL) 1 MG tablet, Take 1 tablet by mouth Every Morning Before Breakfast., Disp: , Rfl:   •  HYDROcodone-acetaminophen (NORCO) 5-325 MG per tablet, Take 1 tablet by mouth Every 6 (Six) Hours As Needed., Disp: , Rfl:   •  levothyroxine (SYNTHROID, LEVOTHROID) 112 MCG tablet, Take 1 tablet by mouth Daily., Disp: , Rfl:   •  losartan (COZAAR) 100 MG tablet, Take 1 tablet by mouth Daily., Disp: , Rfl:   •  spironolactone (ALDACTONE) 25 MG tablet, Take 1 tablet by mouth Daily., Disp: , Rfl:   •  tamsulosin (FLOMAX) 0.4 MG capsule 24 hr capsule, Take 1 capsule by mouth Daily., Disp: , Rfl:   •  finasteride (PROSCAR) 5 MG tablet, Take 1 tablet by mouth Daily., Disp: , Rfl:     ROS     Objective     /82 (BP Location: Left arm, Patient Position: Sitting, Cuff Size: Large Adult)   Pulse 74   Ht 170.2 cm (67\")   Wt (!) 157 kg (347 lb 3.2 oz)   SpO2 95%   BMI 54.38 kg/m²     Physical Exam    Lab Review:    Lab Results   Component Value Date     04/11/2024    K 4.7 04/11/2024     04/11/2024    BUN 17 04/11/2024    CREATININE 1.01 04/11/2024    GLUCOSE 95 04/11/2024    CALCIUM 9.7 04/11/2024    " "ALT 40 04/11/2024    ALKPHOS 67 04/11/2024    LABIL2 1.5 04/11/2024     No results found for: \"CKTOTAL\"  Lab Results   Component Value Date    WBC 11.6 (H) 04/11/2024    HGB 14.9 04/11/2024    HCT 45.5 04/11/2024     04/11/2024     No results found for: \"INR\"  No results found for: \"MG\"  Lab Results   Component Value Date    CHLPL 165 04/11/2024    TRIG 258 (H) 04/11/2024    HDL 36 (L) 04/11/2024    VLDL 43 (H) 04/11/2024     No results found for: \"BNP\"    Procedures    I reviewed the patient's new clinical results.  I personally viewed and interpreted the patient's EKG/lab data        Assessment:  No diagnosis found.       Plan:  No diagnosis found.      Thank you for giving me the oppertunity to participate in your patient's cardiac care.    Sincerely,    OPAL Ken M.D. [facility]     No follow-ups on file.     "

## 2024-04-30 PROBLEM — Z01.818 PREOPERATIVE CLEARANCE: Status: ACTIVE | Noted: 2024-04-30

## 2024-05-06 ENCOUNTER — HOSPITAL ENCOUNTER (OUTPATIENT)
Dept: NUCLEAR MEDICINE | Facility: HOSPITAL | Age: 59
Discharge: HOME | End: 2024-05-06
Payer: COMMERCIAL

## 2024-05-06 DIAGNOSIS — E11.69 TYPE 2 DIABETES MELLITUS WITH OBESITY: ICD-10-CM

## 2024-05-06 DIAGNOSIS — E66.9 TYPE 2 DIABETES MELLITUS WITH OBESITY: ICD-10-CM

## 2024-05-06 PROCEDURE — 0 TECHNETIUM SULFUR COLLOID: Performed by: PHYSICIAN ASSISTANT

## 2024-05-06 PROCEDURE — 78264 GASTRIC EMPTYING IMG STUDY: CPT | Performed by: RADIOLOGY

## 2024-05-06 PROCEDURE — 78264 GASTRIC EMPTYING IMG STUDY: CPT

## 2024-05-06 PROCEDURE — A9541 TC99M SULFUR COLLOID: HCPCS | Performed by: PHYSICIAN ASSISTANT

## 2024-05-06 RX ADMIN — TECHNETIUM TC 99M SULFUR COLLOID 1 DOSE: KIT at 11:52

## 2024-05-28 ENCOUNTER — OFFICE VISIT (OUTPATIENT)
Dept: PULMONOLOGY | Facility: CLINIC | Age: 59
End: 2024-05-28
Payer: COMMERCIAL

## 2024-05-28 ENCOUNTER — TELEPHONE (OUTPATIENT)
Dept: CARDIOLOGY | Facility: CLINIC | Age: 59
End: 2024-05-28
Payer: COMMERCIAL

## 2024-05-28 VITALS
WEIGHT: 315 LBS | OXYGEN SATURATION: 96 % | TEMPERATURE: 96.6 F | SYSTOLIC BLOOD PRESSURE: 146 MMHG | BODY MASS INDEX: 46.65 KG/M2 | DIASTOLIC BLOOD PRESSURE: 90 MMHG | HEART RATE: 97 BPM | HEIGHT: 69 IN

## 2024-05-28 DIAGNOSIS — G47.33 OSA (OBSTRUCTIVE SLEEP APNEA): Primary | ICD-10-CM

## 2024-05-28 DIAGNOSIS — R06.09 DYSPNEA ON EXERTION: ICD-10-CM

## 2024-05-28 DIAGNOSIS — Z01.818 PRE-OP EXAM: ICD-10-CM

## 2024-05-28 DIAGNOSIS — E66.01 MORBID OBESITY WITH BMI OF 50.0-59.9, ADULT: ICD-10-CM

## 2024-05-28 RX ORDER — ALBUTEROL SULFATE 90 UG/1
2 AEROSOL, METERED RESPIRATORY (INHALATION) EVERY 4 HOURS PRN
Qty: 6.7 G | Refills: 5 | Status: SHIPPED | OUTPATIENT
Start: 2024-05-28

## 2024-05-28 NOTE — TELEPHONE ENCOUNTER
"  Caller: Sandra Garza \"Aly\"    Relationship: Self    Best call back number: [phone]    What is the best time to reach you: ANY    What was the call regarding: PATIENT ASKING IF HE NEEDS TO FAST BEFORE TESTING PLEASE CALL TO ADVISE.     Is it okay if the provider responds through MyChart: NO    "

## 2024-05-28 NOTE — TELEPHONE ENCOUNTER
Called pt, no answer, no vm set up.    Fasting for what test????   If it is for a stress test he will have to call and ask for scheduling to ask for instructions.

## 2024-05-28 NOTE — PROGRESS NOTES
"Chief Complaint  Sleep Apnea (Pt needs an order for mask and settings to be adjusted for comfort. ), DYSPNEA ON EXERTION, Obesity, and surgery clearance (Weight loss surgery )    Subjective        Sandra Garza presents to [facility]  History of Present Illness    Mr. Garza is a 58 year old male with a medical history significant for arthritis, BPH, hyperlipidemia, hypertension, hypothyroidism, seasonal allergies, and sleep apnea.    He presents today for follow up on sleep apnea.  He has previously been seen at [facility] in [place].  He states that he needs a new order for a bipap mask.  He reports that he has not been able to use it due to not having a mask and the insurance company will not pay for supplies due to noncompliance.  He also states that he feels that his settings are to high.  He does complain of occasional shortness of breath and productive cough.  He is not currently using an inhaler.  He is also needing a surgical clearance for bariatric surgery.  He is a former smoker.      Objective   Vital Signs:  /90   Pulse 97   Temp 96.6 °F (35.9 °C)   Ht 175.3 cm (69\")   Wt (!) 157 kg (347 lb)   SpO2 96%   BMI 51.24 kg/m²   Estimated body mass index is 51.24 kg/m² as calculated from the following:    Height as of this encounter: 175.3 cm (69\").    Weight as of this encounter: 157 kg (347 lb).               Physical Exam     GENERAL APPEARANCE: Well developed, well nourished, alert and cooperative, and appears to be in no acute distress.    HEAD: normocephalic. Atraumatic.    EYES: PERRL, EOMI. Vision is grossly intact.    THROAT: Oral cavity and pharynx normal. No inflammation, swelling, exudate, or lesions.     NECK: Neck supple.  No thyromegaly.    CARDIAC: Normal S1 and S2. No S3, S4 or murmurs. Rhythm is regular.     RESPIRATORY:Bilateral air entry positive. Bilateral diminished breath sounds. No wheezing, crackles or " rhonchi noted.    GI: Positive bowel sounds. Soft, nondistended, nontender.     MUSCULOSKELETAL: No significant deformity or joint abnormality. No edema. Peripheral pulses intact. No varicosities.    NEUROLOGICAL: Strength and sensation symmetric and intact throughout.     PSYCHIATRIC: The mental examination revealed the patient was oriented to person, place, and time.     Result Review :    The following data was reviewed by: MARIAELENA Clark on 05/28/2024:  Common labs          4/11/2024    13:11   Common Labs   Glucose 95    BUN 17    Creatinine 1.01    Sodium 140    Potassium 4.7    Chloride 100    Calcium 9.7    Total Protein 7.7    Albumin 4.6    Total Bilirubin 0.4    Alkaline Phosphatase 67    AST (SGOT) 30    ALT (SGPT) 40    WBC 11.6    Hemoglobin 14.9    Hematocrit 45.5    Platelets 286    Total Cholesterol 165    Triglycerides 258    HDL Cholesterol 36    LDL Cholesterol  86    Hemoglobin A1C 6.5                   Assessment and Plan     Diagnoses and all orders for this visit:    1. EVA (obstructive sleep apnea) (Primary)  -     Miscellaneous DME    2. Dyspnea on exertion    3. Morbid obesity with BMI of 50.0-59.9, adult    4. Pre-op exam    Other orders  -     albuterol sulfate  (90 Base) MCG/ACT inhaler; Inhale 2 puffs Every 4 (Four) Hours As Needed for Wheezing.  Dispense: 6.7 g; Refill: 5           Spirometry was done in office.  No obstruction was noted.  Moderate restriction is noted.    Patients with obstructive sleep apnea have 2-4 higher risk of perioperative complications compared with patients without obstructive sleep apnea.  Respiratory complication(desaturation, respiratory failure) are the most common.      If intubation is required, pulmonology recommends using sedatives that do not result in histamine release.  For ventilator settings if needed, recommend lung protective strategy with low lung volume   - tidal volume of 6-8 mL/kg of predicted body weight  - PEEP at 6-8  cm of water, plateau pressure < 30  Also recommend   - DVT prophylaxis  judicious use of postoperative opioids to avoid oxygen desaturation and apnea.  ncentive spirometer use post operatively to increase lung volume and to prevent basal atelectasis.  Early mobilization and adequate pain control to avoid splinting and ventilation perfusion mismatch        Complications include post-operative pneumonia, atelectasis, respiratory failure with ventilatory support, acute lung injury (aspiration pneumonitis), acute respiratory distress syndrome, and pulmonary embolism.     He will be at moderate risk for complications related to bariatric surgery.      Shortness of breath is likely related to related to body habitus, deconditioning and untreated sleep apnea.    Sent in albuterol inhaler as he reports symptomatic relief with this.    Also sent in order for new bipap mask. Will also review bipap settings.     Follow Up     Return in about 2 months (around 7/28/2024).  Patient was given instructions and counseling regarding his condition or for health maintenance advice. Please see specific information pulled into the AVS if appropriate.

## 2024-05-29 LAB
FEV1/FVC: 76 %
FEV1: 2.29 LITERS
FVC VOL RESPIRATORY: 3.01 LITERS

## 2024-05-29 ASSESSMENT — PULMONARY FUNCTION TESTS
FVC: 3.01
FEV1/FVC: 76
FEV1: 2.29

## 2024-06-03 DIAGNOSIS — G47.33 OSA (OBSTRUCTIVE SLEEP APNEA): Primary | ICD-10-CM

## 2024-06-03 NOTE — PROGRESS NOTES
Spoke with the patient about the best option is moving forward with treating his sleep apnea.  I recommended an in lab titration study.  He was agreeable with this plan.

## 2024-06-20 ENCOUNTER — HOSPITAL ENCOUNTER (OUTPATIENT)
Dept: NUCLEAR MEDICINE | Facility: HOSPITAL | Age: 59
Discharge: HOME | End: 2024-06-20
Payer: COMMERCIAL

## 2024-06-20 ENCOUNTER — HOSPITAL ENCOUNTER (OUTPATIENT)
Dept: CARDIOLOGY | Facility: HOSPITAL | Age: 59
Discharge: HOME | End: 2024-06-20
Payer: COMMERCIAL

## 2024-06-20 DIAGNOSIS — R06.09 DYSPNEA ON EXERTION: ICD-10-CM

## 2024-06-20 DIAGNOSIS — R94.31 ABNORMAL EKG: ICD-10-CM

## 2024-06-20 LAB
BH CV ECHO MEAS - ACS: 1.9 CM
BH CV ECHO MEAS - AO MAX PG: 5 MMHG
BH CV ECHO MEAS - AO MEAN PG: 3.5 MMHG
BH CV ECHO MEAS - AO ROOT DIAM: 3.1 CM
BH CV ECHO MEAS - AO V2 MAX: 111.5 CM/SEC
BH CV ECHO MEAS - AO V2 VTI: 27.8 CM
BH CV ECHO MEAS - AVA(I,D): 1.73 CM2
BH CV ECHO MEAS - EDV(CUBED): 125 ML
BH CV ECHO MEAS - EDV(MOD-SP4): 133 ML
BH CV ECHO MEAS - EF(MOD-SP4): 46.7 %
BH CV ECHO MEAS - ESV(CUBED): 46.7 ML
BH CV ECHO MEAS - ESV(MOD-SP4): 70.9 ML
BH CV ECHO MEAS - FS: 28 %
BH CV ECHO MEAS - IVS/LVPW: 1.18 CM
BH CV ECHO MEAS - IVSD: 1.3 CM
BH CV ECHO MEAS - LA DIMENSION: 4.5 CM
BH CV ECHO MEAS - LAT PEAK E' VEL: 10.9 CM/SEC
BH CV ECHO MEAS - LV DIASTOLIC VOL/BSA (35-75): 50.9 CM2
BH CV ECHO MEAS - LV MASS(C)D: 233.7 GRAMS
BH CV ECHO MEAS - LV MAX PG: 2.33 MMHG
BH CV ECHO MEAS - LV MEAN PG: 1 MMHG
BH CV ECHO MEAS - LV SYSTOLIC VOL/BSA (12-30): 27.2 CM2
BH CV ECHO MEAS - LV V1 MAX: 76.4 CM/SEC
BH CV ECHO MEAS - LV V1 VTI: 15.3 CM
BH CV ECHO MEAS - LVIDD: 5 CM
BH CV ECHO MEAS - LVIDS: 3.6 CM
BH CV ECHO MEAS - LVOT AREA: 3.1 CM2
BH CV ECHO MEAS - LVOT DIAM: 2 CM
BH CV ECHO MEAS - LVPWD: 1.1 CM
BH CV ECHO MEAS - MED PEAK E' VEL: 7.7 CM/SEC
BH CV ECHO MEAS - MV A MAX VEL: 64.7 CM/SEC
BH CV ECHO MEAS - MV E MAX VEL: 58.7 CM/SEC
BH CV ECHO MEAS - MV E/A: 0.91
BH CV ECHO MEAS - PA ACC TIME: 0.08 SEC
BH CV ECHO MEAS - RAP SYSTOLE: 10 MMHG
BH CV ECHO MEAS - RVSP: 14.9 MMHG
BH CV ECHO MEAS - SV(LVOT): 48.1 ML
BH CV ECHO MEAS - SV(MOD-SP4): 62.1 ML
BH CV ECHO MEAS - SVI(LVOT): 18.4 ML/M2
BH CV ECHO MEAS - SVI(MOD-SP4): 23.8 ML/M2
BH CV ECHO MEAS - TAPSE (>1.6): 2.41 CM
BH CV ECHO MEAS - TR MAX PG: 4.9 MMHG
BH CV ECHO MEAS - TR MAX VEL: 111 CM/SEC
BH CV ECHO MEASUREMENTS AVERAGE E/E' RATIO: 6.31

## 2024-06-20 PROCEDURE — A9500 TC99M SESTAMIBI: HCPCS | Performed by: INTERNAL MEDICINE

## 2024-06-20 PROCEDURE — 78452 HT MUSCLE IMAGE SPECT MULT: CPT

## 2024-06-20 PROCEDURE — 93017 CV STRESS TEST TRACING ONLY: CPT

## 2024-06-20 PROCEDURE — 93306 TTE W/DOPPLER COMPLETE: CPT

## 2024-06-20 PROCEDURE — 0 TECHNETIUM SESTAMIBI: Performed by: INTERNAL MEDICINE

## 2024-06-20 RX ADMIN — TECHNETIUM TC 99M SESTAMIBI 1 DOSE: 1 INJECTION INTRAVENOUS at 10:30

## 2024-06-21 LAB
BH CV NUCLEAR PRIOR STUDY: 3
BH CV REST NUCLEAR ISOTOPE DOSE: 18.1 MCI
BH CV STRESS BP STAGE 1: NORMAL
BH CV STRESS COMMENTS STAGE 1: NORMAL
BH CV STRESS DOSE REGADENOSON STAGE 1: 0.4
BH CV STRESS DURATION MIN STAGE 1: 0
BH CV STRESS DURATION SEC STAGE 1: 10
BH CV STRESS HR STAGE 1: 86
BH CV STRESS NUCLEAR ISOTOPE DOSE: 30.5 MCI
BH CV STRESS PROTOCOL 1: NORMAL
BH CV STRESS RECOVERY BP: NORMAL MMHG
BH CV STRESS RECOVERY HR: 74 BPM
BH CV STRESS STAGE 1: 1
LV EF NUC BP: 54 %
MAXIMAL PREDICTED HEART RATE: 163 BPM
PERCENT MAX PREDICTED HR: 52.76 %
STRESS BASELINE BP: NORMAL MMHG
STRESS BASELINE HR: 83 BPM
STRESS PERCENT HR: 62 %
STRESS POST PEAK BP: NORMAL MMHG
STRESS POST PEAK HR: 86 BPM
STRESS TARGET HR: 139 BPM

## 2024-06-21 PROCEDURE — A9500 TC99M SESTAMIBI: HCPCS | Performed by: INTERNAL MEDICINE

## 2024-06-21 PROCEDURE — 25010000002 REGADENOSON 0.4 MG/5ML SOLUTION: Performed by: INTERNAL MEDICINE

## 2024-06-21 PROCEDURE — 0 TECHNETIUM SESTAMIBI: Performed by: INTERNAL MEDICINE

## 2024-06-21 RX ORDER — REGADENOSON 0.08 MG/ML
0.4 INJECTION, SOLUTION INTRAVENOUS
Status: COMPLETED | OUTPATIENT
Start: 2024-06-21 | End: 2024-06-21

## 2024-06-21 RX ADMIN — TECHNETIUM TC 99M SESTAMIBI 1 DOSE: 1 INJECTION INTRAVENOUS at 07:51

## 2024-06-21 RX ADMIN — REGADENOSON 0.4 MG: 0.08 INJECTION, SOLUTION INTRAVENOUS at 07:51

## 2024-06-24 ENCOUNTER — OFFICE VISIT (OUTPATIENT)
Dept: CARDIOLOGY | Facility: CLINIC | Age: 59
End: 2024-06-24
Payer: COMMERCIAL

## 2024-06-24 VITALS
HEIGHT: 67 IN | BODY MASS INDEX: 49.44 KG/M2 | DIASTOLIC BLOOD PRESSURE: 90 MMHG | WEIGHT: 315 LBS | SYSTOLIC BLOOD PRESSURE: 144 MMHG | HEART RATE: 74 BPM | OXYGEN SATURATION: 99 %

## 2024-06-24 DIAGNOSIS — I10 PRIMARY HYPERTENSION: ICD-10-CM

## 2024-06-24 DIAGNOSIS — E78.2 MIXED HYPERLIPIDEMIA: ICD-10-CM

## 2024-06-24 DIAGNOSIS — Z01.818 PREOPERATIVE CLEARANCE: Primary | ICD-10-CM

## 2024-06-24 DIAGNOSIS — Z91.89 MULTIPLE RISK FACTORS FOR CORONARY ARTERY DISEASE: ICD-10-CM

## 2024-06-24 DIAGNOSIS — E66.01 MORBID OBESITY WITH BMI OF 50.0-59.9, ADULT: ICD-10-CM

## 2024-06-24 PROCEDURE — 99214 OFFICE O/P EST MOD 30 MIN: CPT | Performed by: INTERNAL MEDICINE

## 2024-06-24 NOTE — PROGRESS NOTES
"subjective     Chief Complaint   Patient presents with    Results     STRESS TEST AND ECHO    Hypertension     TODAY       Problems Addressed This Visit  1. Preoperative clearance    2. Morbid obesity with BMI of 50.0-59.9, adult    3. Primary hypertension    4. Mixed hyperlipidemia    5. Multiple risk factors for coronary artery disease        History of Present Illness    Patient is 58 years old white male who was seen by me for preop cardiac clearance for weight loss surgery under general anesthesia.  Patient has multiple risk factors for coronary artery disease including morbid obesity, hypertension, hyperlipidemia and diabetes mellitus.    He underwent cardiac workup including echo and stress test.  He is here for follow-up.  He denies any chest pain palpitations or shortness of breath.  Blood pressure has been running normally he does not check it regularly.  He is taking his medications regularly..        Past Surgical History:   Procedure Laterality Date    APPENDECTOMY OPEN  1976    CATARACT EXTRACTION W/ INTRAOCULAR LENS IMPLANT Right 07/26/2018    Procedure: CATARACT PHACO EXTRACTION WITH INTRAOCULAR LENS IMPLANT RIGHT;  Surgeon: Melquiades Campo MD;  Location: [facility] OR;  Service: Ophthalmology    CATARACT EXTRACTION W/ INTRAOCULAR LENS IMPLANT Left 08/09/2018    Procedure: CATARACT PHACO EXTRACTION WITH INTRAOCULAR LENS IMPLANT LEFT;  Surgeon: Melquiades Campo MD;  Location: [facility] OR;  Service: Ophthalmology    COLONOSCOPY N/A 07/28/2021    Procedure: COLONOSCOPY WITH POLYPECTOMY;  Surgeon: Jimmy Donohue MD;  Location: [facility] ENDOSCOPY;  Service: Gastroenterology;  Laterality: N/A;    EAR TUBES  1978    INCISION AND DRAINAGE ABSCESS  2008    \"INGROWN HAIR\" LEFT PUBIC AREA, required packing, hospitalized w/ IV abx 5 days.    TONSILLECTOMY AND ADENOIDECTOMY  1972     Family History   Problem Relation Age of Onset    Hypertension Mother     Heart attack Mother     Heart disease Mother     Heart attack Father  "    Heart disease Father     COPD Father     Hypertension Maternal Grandmother     Asthma Maternal Grandmother     Hypertension Maternal Grandfather     Lung disease Maternal Grandfather         black lung    Obesity Paternal Grandfather     Hypertension Paternal Grandfather     Heart attack Paternal Grandfather     Heart disease Paternal Grandfather      Past Medical History:   Diagnosis Date    Arthritis     (R) hip, on Diclofenac + Norco    Aspirin long-term use     for routine prevention    BPH (benign prostatic hyperplasia)     Cataract, bilateral     Chronic bronchitis     prn inhaler/steroid use, does not follow w/ pulmonary    Dyspepsia     Dyspnea on exertion     Fatigue     Former tobacco use     quit dip 2023, quit smoking 2012    Heartburn     chronic/episodic, prn OTC PPIs, denies prior eval    History of being tatooed     Hyperlipidemia     Hypertension     Hypothyroidism     Impaired mobility     d/t chronic hip pain, ambulates w/ cane    Kidney stone     Morbid obesity with BMI of 50.0-59.9, adult     Seasonal allergies     Sleep apnea     noncompliant w/ device, cannot tolerate    Wears glasses     reading only     Patient Active Problem List   Diagnosis    Screening for colon cancer    Sleep apnea    Morbid obesity with BMI of 50.0-59.9, adult    Fatigue    Dyspepsia    Hypothyroidism    Hyperlipidemia    BPH (benign prostatic hyperplasia)    Seasonal allergies    Hypertension    Arthritis    Aspirin long-term use    Dyspnea on exertion    Former tobacco use    Chronic bronchitis    Heartburn    Impaired mobility    Kidney stone    Abnormal EKG,incomplete RBBB    Preoperative clearance    Multiple risk factors for coronary artery disease       Social History     Tobacco Use    Smoking status: Former     Current packs/day: 0.00     Average packs/day: 0.5 packs/day for 28.0 years (14.0 ttl pk-yrs)     Types: Cigarettes     Start date: 7/25/1980     Quit date: 7/25/2008     Years since quitting: 15.9      Passive exposure: Past    Smokeless tobacco: Former     Types: Snuff     Quit date: 2023   Vaping Use    Vaping status: Never Used   Substance Use Topics    Alcohol use: Yes     Comment: occ.    Drug use: No       Allergies   Allergen Reactions    Sulfa Antibiotics Nausea And Vomiting       Current Outpatient Medications on File Prior to Visit   Medication Sig    albuterol sulfate  (90 Base) MCG/ACT inhaler Inhale 2 puffs Every 4 (Four) Hours As Needed for Wheezing.    aspirin  MG tablet Take 1 tablet by mouth Daily.    atorvastatin (LIPITOR) 20 MG tablet Take 1 tablet by mouth Daily.    cyclobenzaprine (FLEXERIL) 10 MG tablet Take 1 tablet by mouth 2 (Two) Times a Day As Needed for Muscle Spasms.    diclofenac (VOLTAREN) 75 MG EC tablet Take 1 tablet by mouth 2 (Two) Times a Day.    diclofenac-miSOPROStol (ARTHROTEC 75) 75-0.2 MG EC tablet Take 1 tablet by mouth 2 (Two) Times a Day.    finasteride (PROSCAR) 5 MG tablet Take 1 tablet by mouth Daily.    fluticasone (FLONASE) 50 MCG/ACT nasal spray 2 sprays into the nostril(s) as directed by provider Daily.    glimepiride (AMARYL) 1 MG tablet Take 1 tablet by mouth Every Morning Before Breakfast.    HYDROcodone-acetaminophen (NORCO) 5-325 MG per tablet Take 1 tablet by mouth Every 6 (Six) Hours As Needed.    levothyroxine (SYNTHROID, LEVOTHROID) 112 MCG tablet Take 1 tablet by mouth Daily.    losartan (COZAAR) 100 MG tablet Take 1 tablet by mouth Daily.    spironolactone (ALDACTONE) 25 MG tablet Take 1 tablet by mouth Daily.    tamsulosin (FLOMAX) 0.4 MG capsule 24 hr capsule Take 1 capsule by mouth Daily.     No current facility-administered medications on file prior to visit.     (Not in a hospital admission)      Results for orders placed during the hospital encounter of 06/20/24    Adult Transthoracic Echo Complete W/ Cont if Necessary Per Protocol    Interpretation Summary    Technically poor quality echo and Doppler study was obtained.    Left  ventricular wall thickness is consistent with borderline concentric hypertrophy.    Left ventricular systolic function is low normal. Left ventricular ejection fraction appears to be 51 - 55%.    Left ventricular diastolic function is consistent with (grade I) impaired relaxation.    The left atrial cavity is mildly dilated.    No significant valvular heart disease detected.    No pericardial effusion noted.  Apical fat pad detected    Results for orders placed during the hospital encounter of 06/20/24    Stress Test With Myocardial Perfusion One Day    Interpretation Summary    A pharmacological stress test was performed using regadenoson without low-level exercise.    Resting EKG showed sinus rhythm rate of 83 bpm right bundle branch block with nonspecific ST and T wave changes was noted.    ST segments did not show any diagnostic changes.  Occasional PVCs were noted.    Myocardial perfusion imaging indicates a normal myocardial perfusion study with no evidence of ischemia. Impressions are consistent with a low risk study.    Left ventricle is mildly dilated, LV ejection fraction is normal (Calculated EF = 54%).    There is no prior study available for comparison.          The following portions of the patient's history were reviewed and updated as appropriate: allergies, current medications, past family history, past medical history, past social history, past surgical history and problem list.    Review of Systems   Constitutional: Negative.   HENT: Negative.  Negative for congestion.    Eyes: Negative.    Cardiovascular: Negative.  Negative for chest pain, cyanosis, dyspnea on exertion, irregular heartbeat, leg swelling, near-syncope, orthopnea, palpitations, paroxysmal nocturnal dyspnea and syncope.   Respiratory: Negative.  Negative for shortness of breath.    Hematologic/Lymphatic: Negative.    Musculoskeletal: Negative.    Gastrointestinal: Negative.    Neurological: Negative.  Negative for headaches.         "  Objective:     /90 (BP Location: Left arm, Patient Position: Sitting, Cuff Size: Large Adult)   Pulse 74   Ht 170.2 cm (67\")   Wt (!) 159 kg (350 lb)   SpO2 99%   BMI 54.82 kg/m²   Pulmonary:      Effort: Pulmonary effort is normal.      Breath sounds: Normal breath sounds. No stridor. No wheezing. No rhonchi. No rales.   Cardiovascular:      PMI at left midclavicular line. Normal rate. Regular rhythm. Normal S1. Normal S2.       Murmurs: There is no murmur.      No gallop.  No click. No rub.   Pulses:     Intact distal pulses.   Edema:     Peripheral edema absent.           Lab Review  Lab Results   Component Value Date     04/11/2024    K 4.7 04/11/2024     04/11/2024    BUN 17 04/11/2024    CREATININE 1.01 04/11/2024    GLUCOSE 95 04/11/2024    CALCIUM 9.7 04/11/2024    ALT 40 04/11/2024    ALKPHOS 67 04/11/2024    LABIL2 1.5 04/11/2024     No results found for: \"CKTOTAL\"  Lab Results   Component Value Date    WBC 11.6 (H) 04/11/2024    HGB 14.9 04/11/2024    HCT 45.5 04/11/2024     04/11/2024     No results found for: \"INR\"  No results found for: \"MG\"  Lab Results   Component Value Date    TSH 13.900 (H) 04/11/2024     No results found for: \"BNP\"  Lab Results   Component Value Date    CHLPL 165 04/11/2024    TRIG 258 (H) 04/11/2024    HDL 36 (L) 04/11/2024    VLDL 43 (H) 04/11/2024    LDL 86 04/11/2024     Lab Results   Component Value Date    LDL 86 04/11/2024     No results found for: \"PROBNP\"            Procedures       I personally viewed and interpreted the patient's LAB data         Assessment:     1. Preoperative clearance    2. Morbid obesity with BMI of 50.0-59.9, adult    3. Primary hypertension    4. Mixed hyperlipidemia    5. Multiple risk factors for coronary artery disease          Plan:     Patient is 58 years old white male who is here for preop cardiac clearance for weight loss surgery.  Patient has multiple risk factors for coronary artery disease including " hypertension, hyperlipidemia, morbid obesity and diabetes mellitus.    Lab work reviewed LDL is 86 total cholesterol 165  TSH is elevated , Synthroid has been increased since l lab work.    Blood pressure is very well-controlled.    Stress test does not show any significant exercise-induced myocardial ischemia.  LV ejection fraction is around 54%.    Patient is cleared for surgery from cardiac standpoint as low risk.  He is taking aspirin which could be held as needed.        No follow-up bowel was given.  Healthy lifestyle emphasized.  He was advised to follow closely with his PCP.      Thank you for giving me the oppertunity to participate in your patient's cardiac care.    Sincerely,    OPAL Ken M.D. FACP FACC      No follow-ups on file.

## 2024-06-24 NOTE — LETTER
June 24, 2024     Choco Mcmahon MD  [address] [facility] [place] [address]    Patient: Sandra Garza   YOB: 1965   Date of Visit: 6/24/2024       Dear Choco Mcmahon MD    Sandra Garza was in my office today. Below is a copy of my note.    If you have questions, please do not hesitate to call me. I look forward to following Sandra along with you.         Sincerely,        Brandie Ken MD        CC: JORGE Brito    subjective     Chief Complaint   Patient presents with   • Results     STRESS TEST AND ECHO   • Hypertension     TODAY       Problems Addressed This Visit  1. Preoperative clearance    2. Morbid obesity with BMI of 50.0-59.9, adult    3. Primary hypertension    4. Mixed hyperlipidemia    5. Multiple risk factors for coronary artery disease        History of Present Illness    Patient is 58 years old white male who was seen by me for preop cardiac clearance for weight loss surgery under general anesthesia.  Patient has multiple risk factors for coronary artery disease including morbid obesity, hypertension, hyperlipidemia and diabetes mellitus.    He underwent cardiac workup including echo and stress test.  He is here for follow-up.  He denies any chest pain palpitations or shortness of breath.  Blood pressure has been running normally he does not check it regularly.  He is taking his medications regularly..        Past Surgical History:   Procedure Laterality Date   • APPENDECTOMY OPEN  1976   • CATARACT EXTRACTION W/ INTRAOCULAR LENS IMPLANT Right 07/26/2018    Procedure: CATARACT PHACO EXTRACTION WITH INTRAOCULAR LENS IMPLANT RIGHT;  Surgeon: Melquiades Campo MD;  Location: [facility] OR;  Service: Ophthalmology   • CATARACT EXTRACTION W/ INTRAOCULAR LENS IMPLANT Left 08/09/2018    Procedure: CATARACT PHACO EXTRACTION WITH INTRAOCULAR LENS IMPLANT LEFT;  Surgeon: Melquiades Campo MD;  Location: [facility] OR;  Service: Ophthalmology   • COLONOSCOPY N/A 07/28/2021    Procedure:  "COLONOSCOPY WITH POLYPECTOMY;  Surgeon: Jimmy Donohue MD;  Location: [facility] ENDOSCOPY;  Service: Gastroenterology;  Laterality: N/A;   • EAR TUBES  1978   • INCISION AND DRAINAGE ABSCESS  2008    \"INGROWN HAIR\" LEFT PUBIC AREA, required packing, hospitalized w/ IV abx 5 days.   • TONSILLECTOMY AND ADENOIDECTOMY  1972     Family History   Problem Relation Age of Onset   • Hypertension Mother    • Heart attack Mother    • Heart disease Mother    • Heart attack Father    • Heart disease Father    • COPD Father    • Hypertension Maternal Grandmother    • Asthma Maternal Grandmother    • Hypertension Maternal Grandfather    • Lung disease Maternal Grandfather         black lung   • Obesity Paternal Grandfather    • Hypertension Paternal Grandfather    • Heart attack Paternal Grandfather    • Heart disease Paternal Grandfather      Past Medical History:   Diagnosis Date   • Arthritis     (R) hip, on Diclofenac + Norco   • Aspirin long-term use     for routine prevention   • BPH (benign prostatic hyperplasia)    • Cataract, bilateral    • Chronic bronchitis     prn inhaler/steroid use, does not follow w/ pulmonary   • Dyspepsia    • Dyspnea on exertion    • Fatigue    • Former tobacco use     quit dip 2023, quit smoking 2012   • Heartburn     chronic/episodic, prn OTC PPIs, denies prior eval   • History of being tatooed    • Hyperlipidemia    • Hypertension    • Hypothyroidism    • Impaired mobility     d/t chronic hip pain, ambulates w/ cane   • Kidney stone    • Morbid obesity with BMI of 50.0-59.9, adult    • Seasonal allergies    • Sleep apnea     noncompliant w/ device, cannot tolerate   • Wears glasses     reading only     Patient Active Problem List   Diagnosis   • Screening for colon cancer   • Sleep apnea   • Morbid obesity with BMI of 50.0-59.9, adult   • Fatigue   • Dyspepsia   • Hypothyroidism   • Hyperlipidemia   • BPH (benign prostatic hyperplasia)   • Seasonal allergies   • Hypertension   • Arthritis   • " Aspirin long-term use   • Dyspnea on exertion   • Former tobacco use   • Chronic bronchitis   • Heartburn   • Impaired mobility   • Kidney stone   • Abnormal EKG,incomplete RBBB   • Preoperative clearance   • Multiple risk factors for coronary artery disease       Social History     Tobacco Use   • Smoking status: Former     Current packs/day: 0.00     Average packs/day: 0.5 packs/day for 28.0 years (14.0 ttl pk-yrs)     Types: Cigarettes     Start date: 7/25/1980     Quit date: 7/25/2008     Years since quitting: 15.9     Passive exposure: Past   • Smokeless tobacco: Former     Types: Snuff     Quit date: 2023   Vaping Use   • Vaping status: Never Used   Substance Use Topics   • Alcohol use: Yes     Comment: occ.   • Drug use: No       Allergies   Allergen Reactions   • Sulfa Antibiotics Nausea And Vomiting       Current Outpatient Medications on File Prior to Visit   Medication Sig   • albuterol sulfate  (90 Base) MCG/ACT inhaler Inhale 2 puffs Every 4 (Four) Hours As Needed for Wheezing.   • aspirin  MG tablet Take 1 tablet by mouth Daily.   • atorvastatin (LIPITOR) 20 MG tablet Take 1 tablet by mouth Daily.   • cyclobenzaprine (FLEXERIL) 10 MG tablet Take 1 tablet by mouth 2 (Two) Times a Day As Needed for Muscle Spasms.   • diclofenac (VOLTAREN) 75 MG EC tablet Take 1 tablet by mouth 2 (Two) Times a Day.   • diclofenac-miSOPROStol (ARTHROTEC 75) 75-0.2 MG EC tablet Take 1 tablet by mouth 2 (Two) Times a Day.   • finasteride (PROSCAR) 5 MG tablet Take 1 tablet by mouth Daily.   • fluticasone (FLONASE) 50 MCG/ACT nasal spray 2 sprays into the nostril(s) as directed by provider Daily.   • glimepiride (AMARYL) 1 MG tablet Take 1 tablet by mouth Every Morning Before Breakfast.   • HYDROcodone-acetaminophen (NORCO) 5-325 MG per tablet Take 1 tablet by mouth Every 6 (Six) Hours As Needed.   • levothyroxine (SYNTHROID, LEVOTHROID) 112 MCG tablet Take 1 tablet by mouth Daily.   • losartan (COZAAR) 100 MG  tablet Take 1 tablet by mouth Daily.   • spironolactone (ALDACTONE) 25 MG tablet Take 1 tablet by mouth Daily.   • tamsulosin (FLOMAX) 0.4 MG capsule 24 hr capsule Take 1 capsule by mouth Daily.     No current facility-administered medications on file prior to visit.     (Not in a hospital admission)      Results for orders placed during the hospital encounter of 06/20/24    Adult Transthoracic Echo Complete W/ Cont if Necessary Per Protocol    Interpretation Summary  •  Technically poor quality echo and Doppler study was obtained.  •  Left ventricular wall thickness is consistent with borderline concentric hypertrophy.  •  Left ventricular systolic function is low normal. Left ventricular ejection fraction appears to be 51 - 55%.  •  Left ventricular diastolic function is consistent with (grade I) impaired relaxation.  •  The left atrial cavity is mildly dilated.  •  No significant valvular heart disease detected.  •  No pericardial effusion noted.  Apical fat pad detected    Results for orders placed during the hospital encounter of 06/20/24    Stress Test With Myocardial Perfusion One Day    Interpretation Summary  •  A pharmacological stress test was performed using regadenoson without low-level exercise.  •  Resting EKG showed sinus rhythm rate of 83 bpm right bundle branch block with nonspecific ST and T wave changes was noted.  •  ST segments did not show any diagnostic changes.  Occasional PVCs were noted.  •  Myocardial perfusion imaging indicates a normal myocardial perfusion study with no evidence of ischemia. Impressions are consistent with a low risk study.  •  Left ventricle is mildly dilated, LV ejection fraction is normal (Calculated EF = 54%).  •  There is no prior study available for comparison.          The following portions of the patient's history were reviewed and updated as appropriate: allergies, current medications, past family history, past medical history, past social history, past  "surgical history and problem list.    Review of Systems   Constitutional: Negative.   HENT: Negative.  Negative for congestion.    Eyes: Negative.    Cardiovascular: Negative.  Negative for chest pain, cyanosis, dyspnea on exertion, irregular heartbeat, leg swelling, near-syncope, orthopnea, palpitations, paroxysmal nocturnal dyspnea and syncope.   Respiratory: Negative.  Negative for shortness of breath.    Hematologic/Lymphatic: Negative.    Musculoskeletal: Negative.    Gastrointestinal: Negative.    Neurological: Negative.  Negative for headaches.          Objective:     /90 (BP Location: Left arm, Patient Position: Sitting, Cuff Size: Large Adult)   Pulse 74   Ht 170.2 cm (67\")   Wt (!) 159 kg (350 lb)   SpO2 99%   BMI 54.82 kg/m²   Pulmonary:      Effort: Pulmonary effort is normal.      Breath sounds: Normal breath sounds. No stridor. No wheezing. No rhonchi. No rales.   Cardiovascular:      PMI at left midclavicular line. Normal rate. Regular rhythm. Normal S1. Normal S2.       Murmurs: There is no murmur.      No gallop.  No click. No rub.   Pulses:     Intact distal pulses.   Edema:     Peripheral edema absent.           Lab Review  Lab Results   Component Value Date     04/11/2024    K 4.7 04/11/2024     04/11/2024    BUN 17 04/11/2024    CREATININE 1.01 04/11/2024    GLUCOSE 95 04/11/2024    CALCIUM 9.7 04/11/2024    ALT 40 04/11/2024    ALKPHOS 67 04/11/2024    LABIL2 1.5 04/11/2024     No results found for: \"CKTOTAL\"  Lab Results   Component Value Date    WBC 11.6 (H) 04/11/2024    HGB 14.9 04/11/2024    HCT 45.5 04/11/2024     04/11/2024     No results found for: \"INR\"  No results found for: \"MG\"  Lab Results   Component Value Date    TSH 13.900 (H) 04/11/2024     No results found for: \"BNP\"  Lab Results   Component Value Date    CHLPL 165 04/11/2024    TRIG 258 (H) 04/11/2024    HDL 36 (L) 04/11/2024    VLDL 43 (H) 04/11/2024    LDL 86 04/11/2024     Lab Results " "  Component Value Date    LDL 86 04/11/2024     No results found for: \"PROBNP\"            Procedures       I personally viewed and interpreted the patient's LAB data         Assessment:     1. Preoperative clearance    2. Morbid obesity with BMI of 50.0-59.9, adult    3. Primary hypertension    4. Mixed hyperlipidemia    5. Multiple risk factors for coronary artery disease          Plan:     Patient is 58 years old white male who is here for preop cardiac clearance for weight loss surgery.  Patient has multiple risk factors for coronary artery disease including hypertension, hyperlipidemia, morbid obesity and diabetes mellitus.    Lab work reviewed LDL is 86 total cholesterol 165  TSH is elevated , Synthroid has been increased since l lab work.    Blood pressure is very well-controlled.    Stress test does not show any significant exercise-induced myocardial ischemia.  LV ejection fraction is around 54%.    Patient is cleared for surgery from cardiac standpoint as low risk.  He is taking aspirin which could be held as needed.        No follow-up bowel was given.  Healthy lifestyle emphasized.  He was advised to follow closely with his PCP.        No follow-ups on file.    "

## 2024-07-08 ENCOUNTER — TELEPHONE (OUTPATIENT)
Dept: PULMONOLOGY | Facility: CLINIC | Age: 59
End: 2024-07-08

## 2024-07-08 NOTE — TELEPHONE ENCOUNTER
"Caller: Sandra Garza \"Aly\"    Relationship: Self    Best call back number: [phone]    Caller requesting test results: PT    What test was performed: SLEEP STUDY    When was the test performed: 07/02/2024    Where was the test performed: [facility]    Additional notes: PT WOULD LIKE HIS SLEEP STUDY RESULTS. PLEASE CALL THE PT BACK.         "

## 2024-07-23 ENCOUNTER — TRANSCRIBE ORDERS (OUTPATIENT)
Dept: ADMINISTRATIVE | Facility: HOSPITAL | Age: 59
End: 2024-07-23
Payer: COMMERCIAL

## 2024-07-23 ENCOUNTER — HOSPITAL ENCOUNTER (OUTPATIENT)
Dept: GENERAL RADIOLOGY | Facility: HOSPITAL | Age: 59
Discharge: HOME | End: 2024-07-23
Admitting: FAMILY MEDICINE
Payer: COMMERCIAL

## 2024-07-23 ENCOUNTER — OFFICE VISIT (OUTPATIENT)
Dept: PULMONOLOGY | Facility: CLINIC | Age: 59
End: 2024-07-23
Payer: COMMERCIAL

## 2024-07-23 VITALS
BODY MASS INDEX: 47.74 KG/M2 | OXYGEN SATURATION: 95 % | SYSTOLIC BLOOD PRESSURE: 160 MMHG | DIASTOLIC BLOOD PRESSURE: 90 MMHG | TEMPERATURE: 98.1 F | HEIGHT: 68 IN | HEART RATE: 76 BPM | WEIGHT: 315 LBS

## 2024-07-23 DIAGNOSIS — M79.661 BILATERAL CALF PAIN: ICD-10-CM

## 2024-07-23 DIAGNOSIS — M79.661 BILATERAL CALF PAIN: Primary | ICD-10-CM

## 2024-07-23 DIAGNOSIS — E66.01 MORBID OBESITY WITH BMI OF 50.0-59.9, ADULT: ICD-10-CM

## 2024-07-23 DIAGNOSIS — R06.09 DYSPNEA ON EXERTION: ICD-10-CM

## 2024-07-23 DIAGNOSIS — M79.662 BILATERAL CALF PAIN: Primary | ICD-10-CM

## 2024-07-23 DIAGNOSIS — M79.662 BILATERAL CALF PAIN: ICD-10-CM

## 2024-07-23 DIAGNOSIS — G47.33 OSA (OBSTRUCTIVE SLEEP APNEA): Primary | ICD-10-CM

## 2024-07-23 PROCEDURE — 99214 OFFICE O/P EST MOD 30 MIN: CPT | Performed by: NURSE PRACTITIONER

## 2024-07-23 PROCEDURE — 73590 X-RAY EXAM OF LOWER LEG: CPT | Performed by: RADIOLOGY

## 2024-07-23 PROCEDURE — 73590 X-RAY EXAM OF LOWER LEG: CPT

## 2024-07-23 RX ORDER — MECLIZINE HYDROCHLORIDE 25 MG/1
TABLET ORAL
COMMUNITY
Start: 2024-06-21

## 2024-07-23 RX ORDER — LEVOTHYROXINE SODIUM 0.12 MG/1
TABLET ORAL
COMMUNITY
Start: 2024-06-06

## 2024-07-23 NOTE — PROGRESS NOTES
"Chief Complaint  Sleep Apnea (Mask blows off his face and leaks. Requests humidification. )    Subjective        Sandra Garza presents to [facility] PULMONARY & CRITICAL CARE MEDICINE  History of Present Illness    Mr. Garza is a 58 year old male with a medical history significant for arthritis, BPH, hyperlipidemia, hypertension, hypothyroidism, and sleep apnea.    He presents today for follow up on sleep apnea. He reports that he did his sleep study on July 2nd. He states that he did use his bipap for the first time in months last night.  He reports that he needs some humidification because it is causing him to have dry mouth.  He also reports feeling like his settings have too much pressure..  He does still compliant of intermittent shortness of breath and is using albuterol as needed.      Objective   Vital Signs:  /90   Pulse 76   Temp 98.1 °F (36.7 °C)   Ht 172.7 cm (68\")   Wt (!) 157 kg (347 lb)   SpO2 95%   BMI 52.76 kg/m²   Estimated body mass index is 52.76 kg/m² as calculated from the following:    Height as of this encounter: 172.7 cm (68\").    Weight as of this encounter: 157 kg (347 lb).               Physical Exam     GENERAL APPEARANCE: Well developed, well nourished, alert and cooperative, and appears to be in no acute distress.    HEAD: normocephalic. Atraumatic.    EYES: PERRL, EOMI. Vision is grossly intact.    THROAT: Oral cavity and pharynx normal. No inflammation, swelling, exudate, or lesions.     NECK: Neck supple.  No thyromegaly.    CARDIAC: Normal S1 and S2. No S3, S4 or murmurs. Rhythm is regular.     RESPIRATORY:Bilateral air entry positive. No wheezing, crackles or rhonchi noted.    GI: Positive bowel sounds. Soft, nondistended, nontender.     MUSCULOSKELETAL: No significant deformity or joint abnormality. No edema. Peripheral pulses intact. No varicosities.    NEUROLOGICAL: Strength and sensation symmetric and intact throughout.     PSYCHIATRIC: " The mental examination revealed the patient was oriented to person, place, and time.     Result Review :    The following data was reviewed by: MARIAELENA Clark on 07/23/2024:  Common labs          4/11/2024    13:11   Common Labs   Glucose 95    BUN 17    Creatinine 1.01    Sodium 140    Potassium 4.7    Chloride 100    Calcium 9.7    Total Protein 7.7    Albumin 4.6    Total Bilirubin 0.4    Alkaline Phosphatase 67    AST (SGOT) 30    ALT (SGPT) 40    WBC 11.6    Hemoglobin 14.9    Hematocrit 45.5    Platelets 286    Total Cholesterol 165    Triglycerides 258    HDL Cholesterol 36    LDL Cholesterol  86    Hemoglobin A1C 6.5                   Assessment and Plan     Diagnoses and all orders for this visit:    1. EVA (obstructive sleep apnea) (Primary)  -     PAP Therapy    2. Dyspnea on exertion    3. Morbid obesity with BMI of 50.0-59.9, adult        Still in the process to get set up for bariatric surgery .    Shortness of breath is likely due to body habitus, deconditioning and untreated sleep apnea.    He continues to use albuterol as needed.  He continue to report symptomatic relief.    Reviewed sleep study. He was shown to have the best sleep effieciency on a cpap of 13 with no supplemental oxygen.  An order for this was placed.      We discussed diet and exercise.    Follow Up     Return in about 3 months (around 10/23/2024).  Patient was given instructions and counseling regarding his condition or for health maintenance advice. Please see specific information pulled into the AVS if appropriate.

## 2024-08-12 ENCOUNTER — TELEMEDICINE (OUTPATIENT)
Dept: BARIATRICS/WEIGHT MGMT | Facility: CLINIC | Age: 59
End: 2024-08-12
Payer: COMMERCIAL

## 2024-08-12 VITALS — WEIGHT: 315 LBS | HEIGHT: 67 IN | BODY MASS INDEX: 49.44 KG/M2

## 2024-08-12 DIAGNOSIS — R12 HEARTBURN: Primary | ICD-10-CM

## 2024-08-12 PROCEDURE — 99214 OFFICE O/P EST MOD 30 MIN: CPT | Performed by: PHYSICIAN ASSISTANT

## 2024-08-12 RX ORDER — LEVOTHYROXINE SODIUM 137 UG/1
1 TABLET ORAL DAILY
COMMUNITY
Start: 2024-07-23

## 2024-08-12 NOTE — PROGRESS NOTES
[facility] BARIATRIC SURGERY  [address]    [facility] [id number] [phone]      Patient  Name:  Sandra Garza  :  1965      Date of Visit: 2024      Chief Complaint:  weight gain; unable to maintain weight loss      History of Present Illness:  Sandra Garza is a 58 y.o. male pursuing MBS w/ Dr. Reeves.    Initial Intake Eval 24:  Sandra has been overweight for at least 58 years, has been 35 pounds or more overweight for at least 45 years, has been 100 pounds or more overweight for 15 or more years and started dieting at age 16.  Previous diet attempts include: Low Carbohydrate and Nadeem's Diet.  The most weight Sandra lost was 60 pounds w/ portion control but was unable to maintain that weight loss.  His maximum lifetime weight is 351 pounds.    Intake TSH abnL - meds adjusted.      GES 24 @[facility] - unremarkable.       Cardiac Clearance obtained 24.     Pulmonary Eval 24 - now using CPAP faithfully.           Past Medical History:   Diagnosis Date    Arthritis     (R) hip, on Diclofenac + Norco    Aspirin long-term use     for routine prevention    BPH (benign prostatic hyperplasia)     Cataract, bilateral     Chronic bronchitis     prn inhaler/steroid use, does not follow w/ pulmonary    Dyspepsia     Dyspnea on exertion     Fatigue     Former tobacco use     quit dip , quit smoking     Heartburn     chronic/episodic, prn OTC PPIs, denies prior eval    History of being tatooed     Hyperlipidemia     Hypertension     Hypothyroidism     Impaired mobility     d/t chronic hip pain, ambulates w/ cane    Kidney stone     Morbid obesity with BMI of 50.0-59.9, adult     Seasonal allergies     Sleep apnea     CPAP compliant    Wears glasses     reading only     Past Surgical History:   Procedure Laterality Date    APPENDECTOMY OPEN      CATARACT EXTRACTION W/ INTRAOCULAR LENS IMPLANT Right 2018    Procedure: CATARACT  "PHACO EXTRACTION WITH INTRAOCULAR LENS IMPLANT RIGHT;  Surgeon: Melquiades Campo MD;  Location: [facility] OR;  Service: Ophthalmology    CATARACT EXTRACTION W/ INTRAOCULAR LENS IMPLANT Left 08/09/2018    Procedure: CATARACT PHACO EXTRACTION WITH INTRAOCULAR LENS IMPLANT LEFT;  Surgeon: Melquiades Campo MD;  Location: [facility] OR;  Service: Ophthalmology    COLONOSCOPY N/A 07/28/2021    Procedure: COLONOSCOPY WITH POLYPECTOMY;  Surgeon: Jimmy Donohue MD;  Location: [facility] ENDOSCOPY;  Service: Gastroenterology;  Laterality: N/A;    EAR TUBES  1978    INCISION AND DRAINAGE ABSCESS  2008    \"INGROWN HAIR\" LEFT PUBIC AREA, required packing, hospitalized w/ IV abx 5 days.    TONSILLECTOMY AND ADENOIDECTOMY  1972       Allergies   Allergen Reactions    Sulfa Antibiotics Nausea And Vomiting       Current Outpatient Medications:     albuterol sulfate  (90 Base) MCG/ACT inhaler, Inhale 2 puffs Every 4 (Four) Hours As Needed for Wheezing., Disp: 6.7 g, Rfl: 5    aspirin  MG tablet, Take 1 tablet by mouth Daily., Disp: , Rfl:     atorvastatin (LIPITOR) 20 MG tablet, Take 1 tablet by mouth Daily., Disp: , Rfl:     cyclobenzaprine (FLEXERIL) 10 MG tablet, Take 1 tablet by mouth 2 (Two) Times a Day As Needed for Muscle Spasms., Disp: , Rfl:     diclofenac (VOLTAREN) 75 MG EC tablet, Take 1 tablet by mouth 2 (Two) Times a Day., Disp: , Rfl:     finasteride (PROSCAR) 5 MG tablet, Take 1 tablet by mouth Daily., Disp: , Rfl:     fluticasone (FLONASE) 50 MCG/ACT nasal spray, 2 sprays into the nostril(s) as directed by provider Daily., Disp: , Rfl:     glimepiride (AMARYL) 1 MG tablet, Take 1 tablet by mouth Every Morning Before Breakfast., Disp: , Rfl:     HYDROcodone-acetaminophen (NORCO) 5-325 MG per tablet, Take 1 tablet by mouth Every 6 (Six) Hours As Needed., Disp: , Rfl:     levothyroxine (SYNTHROID, LEVOTHROID) 137 MCG tablet, Take 1 tablet by mouth Daily., Disp: , Rfl:     losartan (COZAAR) 100 MG tablet, Take 1 tablet by " mouth Daily., Disp: , Rfl:     meclizine (ANTIVERT) 25 MG tablet, , Disp: , Rfl:     spironolactone (ALDACTONE) 25 MG tablet, Take 1 tablet by mouth Daily., Disp: , Rfl:     tamsulosin (FLOMAX) 0.4 MG capsule 24 hr capsule, Take 1 capsule by mouth Daily., Disp: , Rfl:     Social History     Socioeconomic History    Marital status:    Tobacco Use    Smoking status: Former     Current packs/day: 0.00     Average packs/day: 0.5 packs/day for 28.0 years (14.0 ttl pk-yrs)     Types: Cigarettes     Start date: 1980     Quit date: 2008     Years since quittin.0     Passive exposure: Past    Smokeless tobacco: Former     Types: Snuff     Quit date:    Vaping Use    Vaping status: Never Used   Substance and Sexual Activity    Alcohol use: Yes     Comment: occ.    Drug use: No    Sexual activity: Defer     Social History     Social History Narrative    Lives in [place], [place].   with 3 adult children.  Works [employer] - .       Family History   Problem Relation Age of Onset    Hypertension Mother     Heart attack Mother     Heart disease Mother     Heart attack Father     Heart disease Father     COPD Father     Hypertension Maternal Grandmother     Asthma Maternal Grandmother     Hypertension Maternal Grandfather     Lung disease Maternal Grandfather         black lung    Obesity Paternal Grandfather     Hypertension Paternal Grandfather     Heart attack Paternal Grandfather     Heart disease Paternal Grandfather        Review of Systems:  Constitutional:  reports fatigue, weight gain and denies fevers, chills.  HEENT:  denies headache, ear pain or loss of hearing, blurred or double vision, nasal discharge or sore throat.  Cardiovascular:  reports HTN, HLD and denies hx heart disease, chest pain, hx DVT.  Respiratory:  reports shortness of breath , sleep apnea and denies cough , wheezing, asthma, COPD, hx PE.  Gastrointestinal:  reports heartburn and denies  dysphagia, nausea, vomiting, abdominal pain, IBS, gallbladder issues, liver disease.  Genitourinary:   denies hematuria, dysuria, polyuria, polydipsia, renal insufficiency.    Musculoskeletal:  reports joint pain, back pain, arthritis and denies autoimmune disease.  Neurological:  denies seizure, stroke.  Psychiatric:  denies depressed mood, feeling anxious, bipolar disorder.  Endocrine:  reports thyroid disease and denies diabetes.  Hematologic:   denies bruising, bleeding disorder, hx anemia, hx blood transfusion.  Skin:   denies rashes, hx MRSA.    Physical Exam:  Vital Signs:  Weight: (!) 156 kg (345 lb)   Body mass index is 54.03 kg/m².              From Intake Eval 4/11/24:  Physical Exam  Vitals reviewed.   Constitutional:       Appearance: He is well-developed.   HENT:      Head: Normocephalic and atraumatic.   Eyes:      General: No scleral icterus.     Conjunctiva/sclera: Conjunctivae normal.   Neck:      Thyroid: No thyromegaly.   Cardiovascular:      Rate and Rhythm: Normal rate and regular rhythm.      Heart sounds: No murmur heard.  Pulmonary:      Effort: Pulmonary effort is normal.      Breath sounds: Rhonchi present. No wheezing or rales.   Abdominal:      General: Bowel sounds are normal. There is no distension.      Palpations: Abdomen is soft. There is no mass.      Tenderness: There is no abdominal tenderness.      Comments: RLQ open appy scar, umbilical hernia noted   Musculoskeletal:         General: Normal range of motion.      Cervical back: Neck supple.   Skin:     General: Skin is warm and dry.      Findings: No rash.   Neurological:      Mental Status: He is alert and oriented to person, place, and time.      Gait: Gait normal.   Psychiatric:         Judgment: Judgment normal.         Patient Active Problem List   Diagnosis    Screening for colon cancer    Sleep apnea    Morbid obesity with BMI of 50.0-59.9, adult    Fatigue    Dyspepsia    Hypothyroidism    Hyperlipidemia    BPH (benign  prostatic hyperplasia)    Seasonal allergies    Hypertension    Arthritis    Aspirin long-term use    Dyspnea on exertion    Former tobacco use    Chronic bronchitis    Heartburn    Impaired mobility    Kidney stone    Abnormal EKG,incomplete RBBB    Preoperative clearance    Multiple risk factors for coronary artery disease       Assessment:  58 y.o. male with medically complicated obesity pursuing sleeve gastrectomy.      ICD-10-CM ICD-9-CM   1. Heartburn  R12 787.1         Metabolic & Bariatric Surgery is deemed medically necessary given the following: Class 3 Severe Obesity (BMI >=40). Obesity-related health conditions include the following: obstructive sleep apnea, hypertension, dyslipidemias, osteoarthritis, and heartburn . Obesity is worsening. BMI is is above average; BMI management plan is completed. We discussed consulting a Bariatric surgeon.        Plan:  Will schedule EGD @[facility] w/ Dr. Reeves.  Additional input to follow.           JORGE Brito        Note: This was an audio and video enabled telemedicine encounter conducted via [employer].  Patient is located in [place].  Provider is at the office. Consent was obtained prior to the visit.

## 2024-08-12 NOTE — H&P (VIEW-ONLY)
[facility] BARIATRIC SURGERY  [address]    [facility] [id number] [phone]      Patient  Name:  Sandra Garza  :  1965      Date of Visit: 2024      Chief Complaint:  weight gain; unable to maintain weight loss      History of Present Illness:  Sandra Garza is a 58 y.o. male pursuing MBS w/ Dr. Reeves.    Initial Intake Eval 24:  Sandra has been overweight for at least 58 years, has been 35 pounds or more overweight for at least 45 years, has been 100 pounds or more overweight for 15 or more years and started dieting at age 16.  Previous diet attempts include: Low Carbohydrate and Nadeem's Diet.  The most weight Sandra lost was 60 pounds w/ portion control but was unable to maintain that weight loss.  His maximum lifetime weight is 351 pounds.    Intake TSH abnL - meds adjusted.      GES 24 @[facility] - unremarkable.       Cardiac Clearance obtained 24.     Pulmonary Eval 24 - now using CPAP faithfully.           Past Medical History:   Diagnosis Date    Arthritis     (R) hip, on Diclofenac + Norco    Aspirin long-term use     for routine prevention    BPH (benign prostatic hyperplasia)     Cataract, bilateral     Chronic bronchitis     prn inhaler/steroid use, does not follow w/ pulmonary    Dyspepsia     Dyspnea on exertion     Fatigue     Former tobacco use     quit dip , quit smoking     Heartburn     chronic/episodic, prn OTC PPIs, denies prior eval    History of being tatooed     Hyperlipidemia     Hypertension     Hypothyroidism     Impaired mobility     d/t chronic hip pain, ambulates w/ cane    Kidney stone     Morbid obesity with BMI of 50.0-59.9, adult     Seasonal allergies     Sleep apnea     CPAP compliant    Wears glasses     reading only     Past Surgical History:   Procedure Laterality Date    APPENDECTOMY OPEN      CATARACT EXTRACTION W/ INTRAOCULAR LENS IMPLANT Right 2018    Procedure: CATARACT  "PHACO EXTRACTION WITH INTRAOCULAR LENS IMPLANT RIGHT;  Surgeon: Melquiades Campo MD;  Location: [facility] OR;  Service: Ophthalmology    CATARACT EXTRACTION W/ INTRAOCULAR LENS IMPLANT Left 08/09/2018    Procedure: CATARACT PHACO EXTRACTION WITH INTRAOCULAR LENS IMPLANT LEFT;  Surgeon: Melquiades Campo MD;  Location: [facility] OR;  Service: Ophthalmology    COLONOSCOPY N/A 07/28/2021    Procedure: COLONOSCOPY WITH POLYPECTOMY;  Surgeon: Jimmy Donohue MD;  Location: [facility] ENDOSCOPY;  Service: Gastroenterology;  Laterality: N/A;    EAR TUBES  1978    INCISION AND DRAINAGE ABSCESS  2008    \"INGROWN HAIR\" LEFT PUBIC AREA, required packing, hospitalized w/ IV abx 5 days.    TONSILLECTOMY AND ADENOIDECTOMY  1972       Allergies   Allergen Reactions    Sulfa Antibiotics Nausea And Vomiting       Current Outpatient Medications:     albuterol sulfate  (90 Base) MCG/ACT inhaler, Inhale 2 puffs Every 4 (Four) Hours As Needed for Wheezing., Disp: 6.7 g, Rfl: 5    aspirin  MG tablet, Take 1 tablet by mouth Daily., Disp: , Rfl:     atorvastatin (LIPITOR) 20 MG tablet, Take 1 tablet by mouth Daily., Disp: , Rfl:     cyclobenzaprine (FLEXERIL) 10 MG tablet, Take 1 tablet by mouth 2 (Two) Times a Day As Needed for Muscle Spasms., Disp: , Rfl:     diclofenac (VOLTAREN) 75 MG EC tablet, Take 1 tablet by mouth 2 (Two) Times a Day., Disp: , Rfl:     finasteride (PROSCAR) 5 MG tablet, Take 1 tablet by mouth Daily., Disp: , Rfl:     fluticasone (FLONASE) 50 MCG/ACT nasal spray, 2 sprays into the nostril(s) as directed by provider Daily., Disp: , Rfl:     glimepiride (AMARYL) 1 MG tablet, Take 1 tablet by mouth Every Morning Before Breakfast., Disp: , Rfl:     HYDROcodone-acetaminophen (NORCO) 5-325 MG per tablet, Take 1 tablet by mouth Every 6 (Six) Hours As Needed., Disp: , Rfl:     levothyroxine (SYNTHROID, LEVOTHROID) 137 MCG tablet, Take 1 tablet by mouth Daily., Disp: , Rfl:     losartan (COZAAR) 100 MG tablet, Take 1 tablet by " mouth Daily., Disp: , Rfl:     meclizine (ANTIVERT) 25 MG tablet, , Disp: , Rfl:     spironolactone (ALDACTONE) 25 MG tablet, Take 1 tablet by mouth Daily., Disp: , Rfl:     tamsulosin (FLOMAX) 0.4 MG capsule 24 hr capsule, Take 1 capsule by mouth Daily., Disp: , Rfl:     Social History     Socioeconomic History    Marital status:    Tobacco Use    Smoking status: Former     Current packs/day: 0.00     Average packs/day: 0.5 packs/day for 28.0 years (14.0 ttl pk-yrs)     Types: Cigarettes     Start date: 1980     Quit date: 2008     Years since quittin.0     Passive exposure: Past    Smokeless tobacco: Former     Types: Snuff     Quit date:    Vaping Use    Vaping status: Never Used   Substance and Sexual Activity    Alcohol use: Yes     Comment: occ.    Drug use: No    Sexual activity: Defer     Social History     Social History Narrative    Lives in [place], [place].   with 3 adult children.  Works [employer] - .       Family History   Problem Relation Age of Onset    Hypertension Mother     Heart attack Mother     Heart disease Mother     Heart attack Father     Heart disease Father     COPD Father     Hypertension Maternal Grandmother     Asthma Maternal Grandmother     Hypertension Maternal Grandfather     Lung disease Maternal Grandfather         black lung    Obesity Paternal Grandfather     Hypertension Paternal Grandfather     Heart attack Paternal Grandfather     Heart disease Paternal Grandfather        Review of Systems:  Constitutional:  reports fatigue, weight gain and denies fevers, chills.  HEENT:  denies headache, ear pain or loss of hearing, blurred or double vision, nasal discharge or sore throat.  Cardiovascular:  reports HTN, HLD and denies hx heart disease, chest pain, hx DVT.  Respiratory:  reports shortness of breath , sleep apnea and denies cough , wheezing, asthma, COPD, hx PE.  Gastrointestinal:  reports heartburn and denies  dysphagia, nausea, vomiting, abdominal pain, IBS, gallbladder issues, liver disease.  Genitourinary:   denies hematuria, dysuria, polyuria, polydipsia, renal insufficiency.    Musculoskeletal:  reports joint pain, back pain, arthritis and denies autoimmune disease.  Neurological:  denies seizure, stroke.  Psychiatric:  denies depressed mood, feeling anxious, bipolar disorder.  Endocrine:  reports thyroid disease and denies diabetes.  Hematologic:   denies bruising, bleeding disorder, hx anemia, hx blood transfusion.  Skin:   denies rashes, hx MRSA.    Physical Exam:  Vital Signs:  Weight: (!) 156 kg (345 lb)   Body mass index is 54.03 kg/m².              From Intake Eval 4/11/24:  Physical Exam  Vitals reviewed.   Constitutional:       Appearance: He is well-developed.   HENT:      Head: Normocephalic and atraumatic.   Eyes:      General: No scleral icterus.     Conjunctiva/sclera: Conjunctivae normal.   Neck:      Thyroid: No thyromegaly.   Cardiovascular:      Rate and Rhythm: Normal rate and regular rhythm.      Heart sounds: No murmur heard.  Pulmonary:      Effort: Pulmonary effort is normal.      Breath sounds: Rhonchi present. No wheezing or rales.   Abdominal:      General: Bowel sounds are normal. There is no distension.      Palpations: Abdomen is soft. There is no mass.      Tenderness: There is no abdominal tenderness.      Comments: RLQ open appy scar, umbilical hernia noted   Musculoskeletal:         General: Normal range of motion.      Cervical back: Neck supple.   Skin:     General: Skin is warm and dry.      Findings: No rash.   Neurological:      Mental Status: He is alert and oriented to person, place, and time.      Gait: Gait normal.   Psychiatric:         Judgment: Judgment normal.         Patient Active Problem List   Diagnosis    Screening for colon cancer    Sleep apnea    Morbid obesity with BMI of 50.0-59.9, adult    Fatigue    Dyspepsia    Hypothyroidism    Hyperlipidemia    BPH (benign  prostatic hyperplasia)    Seasonal allergies    Hypertension    Arthritis    Aspirin long-term use    Dyspnea on exertion    Former tobacco use    Chronic bronchitis    Heartburn    Impaired mobility    Kidney stone    Abnormal EKG,incomplete RBBB    Preoperative clearance    Multiple risk factors for coronary artery disease       Assessment:  58 y.o. male with medically complicated obesity pursuing sleeve gastrectomy.      ICD-10-CM ICD-9-CM   1. Heartburn  R12 787.1         Metabolic & Bariatric Surgery is deemed medically necessary given the following: Class 3 Severe Obesity (BMI >=40). Obesity-related health conditions include the following: obstructive sleep apnea, hypertension, dyslipidemias, osteoarthritis, and heartburn . Obesity is worsening. BMI is is above average; BMI management plan is completed. We discussed consulting a Bariatric surgeon.        Plan:  Will schedule EGD @[facility] w/ Dr. Reeves.  Additional input to follow.           JORGE Brito        Note: This was an audio and video enabled telemedicine encounter conducted via [employer].  Patient is located in [place].  Provider is at the office. Consent was obtained prior to the visit.

## 2024-08-19 ENCOUNTER — HOSPITAL ENCOUNTER (OUTPATIENT)
Facility: HOSPITAL | Age: 59
Setting detail: HOSPITAL OUTPATIENT SURGERY
Discharge: HOME | End: 2024-08-19
Attending: SURGERY | Admitting: SURGERY
Payer: COMMERCIAL

## 2024-08-19 ENCOUNTER — ANESTHESIA (OUTPATIENT)
Dept: GASTROENTEROLOGY | Facility: HOSPITAL | Age: 59
End: 2024-08-19
Payer: COMMERCIAL

## 2024-08-19 ENCOUNTER — ANESTHESIA EVENT (OUTPATIENT)
Dept: GASTROENTEROLOGY | Facility: HOSPITAL | Age: 59
End: 2024-08-19
Payer: COMMERCIAL

## 2024-08-19 VITALS
BODY MASS INDEX: 47.74 KG/M2 | OXYGEN SATURATION: 97 % | RESPIRATION RATE: 20 BRPM | DIASTOLIC BLOOD PRESSURE: 69 MMHG | HEIGHT: 68 IN | TEMPERATURE: 97.9 F | WEIGHT: 315 LBS | HEART RATE: 82 BPM | SYSTOLIC BLOOD PRESSURE: 121 MMHG

## 2024-08-19 DIAGNOSIS — R12 HEARTBURN: ICD-10-CM

## 2024-08-19 LAB
CREAT SERPL-MCNC: 1.17 MG/DL (ref 0.76–1.27)
EGFRCR SERPLBLD CKD-EPI 2021: 72.7 ML/MIN/1.73
POTASSIUM SERPL-SCNC: 4 MMOL/L (ref 3.5–5.2)

## 2024-08-19 PROCEDURE — 82565 ASSAY OF CREATININE: CPT | Performed by: ANESTHESIOLOGY

## 2024-08-19 PROCEDURE — 43239 EGD BIOPSY SINGLE/MULTIPLE: CPT | Performed by: SURGERY

## 2024-08-19 PROCEDURE — 25010000002 PROPOFOL 10 MG/ML EMULSION: Performed by: NURSE ANESTHETIST, CERTIFIED REGISTERED

## 2024-08-19 PROCEDURE — 25810000003 SODIUM CHLORIDE 0.9 % SOLUTION: Performed by: PHYSICIAN ASSISTANT

## 2024-08-19 PROCEDURE — 88305 TISSUE EXAM BY PATHOLOGIST: CPT | Performed by: SURGERY

## 2024-08-19 PROCEDURE — 84132 ASSAY OF SERUM POTASSIUM: CPT | Performed by: ANESTHESIOLOGY

## 2024-08-19 RX ORDER — FENTANYL CITRATE 50 UG/ML
50 INJECTION, SOLUTION INTRAMUSCULAR; INTRAVENOUS
Status: DISCONTINUED | OUTPATIENT
Start: 2024-08-19 | End: 2024-08-19 | Stop reason: HOSPADM

## 2024-08-19 RX ORDER — SODIUM CHLORIDE, SODIUM LACTATE, POTASSIUM CHLORIDE, CALCIUM CHLORIDE 600; 310; 30; 20 MG/100ML; MG/100ML; MG/100ML; MG/100ML
9 INJECTION, SOLUTION INTRAVENOUS CONTINUOUS
Status: CANCELLED | OUTPATIENT
Start: 2024-08-19

## 2024-08-19 RX ORDER — DROPERIDOL 2.5 MG/ML
0.62 INJECTION, SOLUTION INTRAMUSCULAR; INTRAVENOUS ONCE AS NEEDED
Status: DISCONTINUED | OUTPATIENT
Start: 2024-08-19 | End: 2024-08-19 | Stop reason: HOSPADM

## 2024-08-19 RX ORDER — PROPOFOL 10 MG/ML
VIAL (ML) INTRAVENOUS AS NEEDED
Status: DISCONTINUED | OUTPATIENT
Start: 2024-08-19 | End: 2024-08-19 | Stop reason: SURG

## 2024-08-19 RX ORDER — SODIUM CHLORIDE 0.9 % (FLUSH) 0.9 %
10 SYRINGE (ML) INJECTION EVERY 12 HOURS SCHEDULED
Status: CANCELLED | OUTPATIENT
Start: 2024-08-19

## 2024-08-19 RX ORDER — OMEPRAZOLE 40 MG/1
40 CAPSULE, DELAYED RELEASE ORAL DAILY
Qty: 90 CAPSULE | Refills: 3 | Status: SHIPPED | OUTPATIENT
Start: 2024-08-19 | End: 2025-08-14

## 2024-08-19 RX ORDER — SODIUM CHLORIDE 9 MG/ML
40 INJECTION, SOLUTION INTRAVENOUS AS NEEDED
Status: CANCELLED | OUTPATIENT
Start: 2024-08-19

## 2024-08-19 RX ORDER — HYDROMORPHONE HYDROCHLORIDE 1 MG/ML
0.5 INJECTION, SOLUTION INTRAMUSCULAR; INTRAVENOUS; SUBCUTANEOUS
Status: DISCONTINUED | OUTPATIENT
Start: 2024-08-19 | End: 2024-08-19 | Stop reason: HOSPADM

## 2024-08-19 RX ORDER — SODIUM CHLORIDE 0.9 % (FLUSH) 0.9 %
3-10 SYRINGE (ML) INJECTION AS NEEDED
Status: DISCONTINUED | OUTPATIENT
Start: 2024-08-19 | End: 2024-08-19 | Stop reason: HOSPADM

## 2024-08-19 RX ORDER — FAMOTIDINE 20 MG/1
20 TABLET, FILM COATED ORAL ONCE
Status: CANCELLED | OUTPATIENT
Start: 2024-08-19 | End: 2024-08-19

## 2024-08-19 RX ORDER — LIDOCAINE HYDROCHLORIDE 10 MG/ML
0.5 INJECTION, SOLUTION EPIDURAL; INFILTRATION; INTRACAUDAL; PERINEURAL ONCE AS NEEDED
Status: CANCELLED | OUTPATIENT
Start: 2024-08-19

## 2024-08-19 RX ORDER — SODIUM CHLORIDE 9 MG/ML
40 INJECTION, SOLUTION INTRAVENOUS AS NEEDED
Status: DISCONTINUED | OUTPATIENT
Start: 2024-08-19 | End: 2024-08-19 | Stop reason: HOSPADM

## 2024-08-19 RX ORDER — FAMOTIDINE 10 MG/ML
20 INJECTION, SOLUTION INTRAVENOUS ONCE
Status: CANCELLED | OUTPATIENT
Start: 2024-08-19 | End: 2024-08-19

## 2024-08-19 RX ORDER — OMEPRAZOLE 20 MG/1
20 CAPSULE, DELAYED RELEASE ORAL DAILY
COMMUNITY
End: 2024-08-19 | Stop reason: HOSPADM

## 2024-08-19 RX ORDER — LIDOCAINE HYDROCHLORIDE 10 MG/ML
INJECTION, SOLUTION EPIDURAL; INFILTRATION; INTRACAUDAL; PERINEURAL AS NEEDED
Status: DISCONTINUED | OUTPATIENT
Start: 2024-08-19 | End: 2024-08-19 | Stop reason: SURG

## 2024-08-19 RX ORDER — SODIUM CHLORIDE 0.9 % (FLUSH) 0.9 %
10 SYRINGE (ML) INJECTION AS NEEDED
Status: CANCELLED | OUTPATIENT
Start: 2024-08-19

## 2024-08-19 RX ORDER — SENNOSIDES 8.6 MG
650 CAPSULE ORAL EVERY 8 HOURS PRN
COMMUNITY

## 2024-08-19 RX ORDER — MIDAZOLAM HYDROCHLORIDE 1 MG/ML
1 INJECTION INTRAMUSCULAR; INTRAVENOUS
Status: CANCELLED | OUTPATIENT
Start: 2024-08-19

## 2024-08-19 RX ORDER — SODIUM CHLORIDE 0.9 % (FLUSH) 0.9 %
3 SYRINGE (ML) INJECTION EVERY 12 HOURS SCHEDULED
Status: DISCONTINUED | OUTPATIENT
Start: 2024-08-19 | End: 2024-08-19 | Stop reason: HOSPADM

## 2024-08-19 RX ORDER — SODIUM CHLORIDE 9 MG/ML
150 INJECTION, SOLUTION INTRAVENOUS CONTINUOUS
Status: DISCONTINUED | OUTPATIENT
Start: 2024-08-19 | End: 2024-08-19 | Stop reason: HOSPADM

## 2024-08-19 RX ADMIN — TOPICAL ANESTHETIC 2 SPRAY: 200 SPRAY DENTAL; PERIODONTAL at 08:59

## 2024-08-19 RX ADMIN — LIDOCAINE HYDROCHLORIDE 100 MG: 10 INJECTION, SOLUTION EPIDURAL; INFILTRATION; INTRACAUDAL; PERINEURAL at 09:08

## 2024-08-19 RX ADMIN — PROPOFOL 50 MG: 10 INJECTION, EMULSION INTRAVENOUS at 09:12

## 2024-08-19 RX ADMIN — SODIUM CHLORIDE 150 ML/HR: 9 INJECTION, SOLUTION INTRAVENOUS at 07:47

## 2024-08-19 RX ADMIN — PROPOFOL 150 MG: 10 INJECTION, EMULSION INTRAVENOUS at 09:08

## 2024-08-19 NOTE — OP NOTE
PROCEDURE NOTE.    Date: 08/19/24    Patient: Sandra Garza     Surgeon: Leonila Reeves MD      Preoperative Diagnosis: GERD     Postoperative Diagnosis: Hiatal hernia     Procedure Performed: Esophagogastroduodenoscopy with biopsy (CPT 29401)    Estimated Blood Loss: minimal    Complications: none    Findings: GEJ at 41 cm, diffuse gastritis, sliding hiatal hernia.  Erosive esophagitis and irregular Z line with tongues of salmon colored mucosa extending 1-1.5 cm from GEJ.     Specimens: Distal esophageal (40 cm) and antral biopsies     Indications: Sandra Garza is a 58 y.o. year old supermorbidly obese male who is preparing for sleeve gastrectomy.  The patient has chronic, episodic heartburn for which he takes PRN over the counter PPI.  H. Pylori breath test was negative.  She presents today for diagnostic endoscopy.          Procedure:      After informed consent was taken, the patient was brought to the operating room and placed in the supine position. MAC was given by anesthesia staff. A bite block was placed and time out performed. A flexible endoscope was passed transorally down to the second portion of the duodenum without difficulty. The scope was slowly withdrawn and the anatomy examined with photodocumentation throughout. The duodenum was normal. The pylorus was without spasm. The antrum of the stomach was showed diffuse gastritis. A biopsy was taken to rule out H. Pylori. The scope was retroflexed and the body, fundus, and cardia were examined. There was no abnormality and no hiatal hernia seen from this angle. The scope was withdrawn back into the esophagus after decompressing the stomach. The GE junction was at 41 cm and the distal esophagus showed evidence of erosive reflux esophagitis. The Z line was irregular with tongues of salmon colored mucosa extending 1-1.5 cm from GEJBiopsy was taken. There was a sliding hiatal hernia. The scope was further withdrawn. There was no other  esophageal abnormality. The vocal cords were normal. The scope was withdrawn completely and the procedure concluded. The patient was awakened and taken to recovery in good condition.      Recommendations: We will discuss biopsy results at next office appointment.

## 2024-08-19 NOTE — ANESTHESIA PREPROCEDURE EVALUATION
Anesthesia Evaluation     Patient summary reviewed and Nursing notes reviewed   NPO Solid Status: > 8 hours  NPO Liquid Status: > 2 hours           Airway   Mallampati: II  TM distance: >3 FB  Neck ROM: full  No difficulty expected  Dental      Pulmonary    (+) a smoker (20 yrs) Former, cigarettes,shortness of breath, sleep apnea on CPAP  (-) COPD, no home oxygen  Cardiovascular     ECG reviewed    (+) hypertension, hyperlipidemia  (-) past MI, dysrhythmias, angina, cardiac stents    ROS comment: ECG SR Mod IV conduction delay   ECHO 2024 borderline concentric LVH EF >51 %.LVDD (grade I) impaired relaxation.  LA  mildly dilated.  No significant valvular heart disease detected.    MPS 2024 normal.no evidence of ischemia.low risk study.EF = 54%  ·       Neuro/Psych  GI/Hepatic/Renal/Endo    (+) obesity, morbid obesity, thyroid problem   (-) no renal disease, diabetes    Musculoskeletal     Abdominal    Substance History      OB/GYN          Other                      Anesthesia Plan    ASA 3     general     (TOP POM PFL )  intravenous induction     Anesthetic plan, risks, benefits, and alternatives have been provided, discussed and informed consent has been obtained with: patient.    Plan discussed with CRNA.      CODE STATUS:

## 2024-08-19 NOTE — ANESTHESIA POSTPROCEDURE EVALUATION
Patient: Sandra Garza    Procedure Summary       Date: 08/19/24 Room / Location:  [facility] /  [facility]    Anesthesia Start: 0904 Anesthesia Stop: 0922    Procedure: ESOPHAGOGASTRODUODENOSCOPY WITH BIOPSY Diagnosis:       Heartburn      (Heartburn [R12])    Surgeons: Leonila Reeves MD Provider: Lee Landry MD    Anesthesia Type: general ASA Status: 3            Anesthesia Type: general    Vitals  No vitals data found for the desired time range.          Post Anesthesia Care and Evaluation    Patient location during evaluation: PACU  Patient participation: complete - patient participated  Level of consciousness: awake and alert  Pain management: adequate    Airway patency: patent  Anesthetic complications: No anesthetic complications  PONV Status: none  Cardiovascular status: hemodynamically stable and acceptable  Respiratory status: nonlabored ventilation, acceptable and nasal cannula  Hydration status: acceptable    Comments: 117/88 93% rr15 hr 86 97.1

## 2024-08-20 LAB
CYTO UR: NORMAL
LAB AP CASE REPORT: NORMAL
LAB AP CLINICAL INFORMATION: NORMAL
PATH REPORT.FINAL DX SPEC: NORMAL
PATH REPORT.GROSS SPEC: NORMAL

## 2024-09-10 DIAGNOSIS — R53.83 FATIGUE, UNSPECIFIED TYPE: Primary | ICD-10-CM

## 2024-09-10 DIAGNOSIS — R06.00 DYSPNEA, UNSPECIFIED TYPE: ICD-10-CM

## 2024-10-09 ENCOUNTER — LAB (OUTPATIENT)
Dept: LAB | Facility: HOSPITAL | Age: 59
End: 2024-10-09
Payer: COMMERCIAL

## 2024-10-09 PROCEDURE — 80053 COMPREHEN METABOLIC PANEL: CPT | Performed by: PHYSICIAN ASSISTANT

## 2024-10-09 PROCEDURE — 85027 COMPLETE CBC AUTOMATED: CPT | Performed by: PHYSICIAN ASSISTANT

## 2024-10-14 ENCOUNTER — CONSULT (OUTPATIENT)
Dept: BARIATRICS/WEIGHT MGMT | Facility: CLINIC | Age: 59
End: 2024-10-14
Payer: COMMERCIAL

## 2024-10-14 VITALS
HEIGHT: 67 IN | RESPIRATION RATE: 20 BRPM | DIASTOLIC BLOOD PRESSURE: 88 MMHG | HEART RATE: 94 BPM | TEMPERATURE: 97.3 F | WEIGHT: 315 LBS | SYSTOLIC BLOOD PRESSURE: 140 MMHG | OXYGEN SATURATION: 96 % | BODY MASS INDEX: 49.44 KG/M2

## 2024-10-14 DIAGNOSIS — E66.01 MORBID OBESITY WITH BMI OF 50.0-59.9, ADULT: Primary | ICD-10-CM

## 2024-10-14 DIAGNOSIS — I10 ESSENTIAL HYPERTENSION: ICD-10-CM

## 2024-10-14 DIAGNOSIS — E66.9 TYPE 2 DIABETES MELLITUS WITH OBESITY: ICD-10-CM

## 2024-10-14 DIAGNOSIS — G47.30 SLEEP APNEA, UNSPECIFIED TYPE: ICD-10-CM

## 2024-10-14 DIAGNOSIS — E11.69 TYPE 2 DIABETES MELLITUS WITH OBESITY: ICD-10-CM

## 2024-10-14 DIAGNOSIS — K44.9 HIATAL HERNIA: ICD-10-CM

## 2024-10-14 DIAGNOSIS — I10 PRIMARY HYPERTENSION: ICD-10-CM

## 2024-10-14 DIAGNOSIS — E78.2 MIXED HYPERLIPIDEMIA: ICD-10-CM

## 2024-10-14 PROCEDURE — 99215 OFFICE O/P EST HI 40 MIN: CPT | Performed by: SURGERY

## 2024-10-14 PROCEDURE — 99417 PROLNG OP E/M EACH 15 MIN: CPT | Performed by: SURGERY

## 2024-10-14 RX ORDER — SODIUM CHLORIDE 9 MG/ML
40 INJECTION, SOLUTION INTRAVENOUS AS NEEDED
OUTPATIENT
Start: 2024-10-14 | End: 2024-10-21

## 2024-10-14 RX ORDER — SODIUM CHLORIDE 9 MG/ML
150 INJECTION, SOLUTION INTRAVENOUS CONTINUOUS
OUTPATIENT
Start: 2024-10-14 | End: 2024-10-21

## 2024-10-14 RX ORDER — ENOXAPARIN SODIUM 100 MG/ML
60 INJECTION SUBCUTANEOUS
Qty: 8.4 ML | Refills: 0 | Status: SHIPPED | OUTPATIENT
Start: 2024-10-14 | End: 2024-11-04

## 2024-10-14 RX ORDER — CHLORHEXIDINE GLUCONATE ORAL RINSE 1.2 MG/ML
15 SOLUTION DENTAL
OUTPATIENT
Start: 2024-10-14 | End: 2024-10-14

## 2024-10-14 RX ORDER — SODIUM CHLORIDE 0.9 % (FLUSH) 0.9 %
3 SYRINGE (ML) INJECTION EVERY 12 HOURS SCHEDULED
OUTPATIENT
Start: 2024-10-14

## 2024-10-14 RX ORDER — SODIUM CHLORIDE 0.9 % (FLUSH) 0.9 %
10 SYRINGE (ML) INJECTION AS NEEDED
OUTPATIENT
Start: 2024-10-14

## 2024-10-14 NOTE — PROGRESS NOTES
"[facility] BARIATRIC SURGERY  [address]    [facility] [id number] [phone]      Patient  Name:  Sandra Garza  :  1965      Date of Visit: 10/14/2024      Chief Complaint:  weight gain; unable to maintain weight loss    History of Present Illness:  Sandra Garza is a 58 y.o. male who presents today for evaluation, education and consultation regarding weight loss surgery.     Patient has been overweight for many years, with numerous failed dietary/weight loss attempts.  He now has obesity related comorbidities of Oa, HLD,HTN, EVA, DM2  and as such has decided to pursue weight loss surgery.    From intake:  \"  Intake TSH abnL - meds adjusted.      GES 24 @[facility] - unremarkable.       Cardiac Clearance obtained 24.     Pulmonary Eval 24 - now using CPAP faithfully.  \"    10/14/2024 Update:    Reports I operated on his supervisor, Joceline.    Started PPI since I rx after EGD for erosive esophagitis.  Reporst it has really helped his stomach after taking his NSAID    He takes Norco 5 bid tab baseline for chronic pain.    The patient lives in [place], and drives school bus, , bridges.    No personal or family hx of VTE or clotting d/o.  No liver, lung, heart, or renal disease    Review of data:     PORTILLO: monthly Norco  HP neg    CBC          2024    13:11 10/9/2024    09:07   CBC   WBC 11.6  11.92    RBC 5.03  4.60    Hemoglobin 14.9  13.9    Hematocrit 45.5  42.4    MCV 91  92.2    MCH 29.6  30.2    MCHC 32.7  32.8    RDW 13.3  13.0    Platelets 286  332      CMP          2024    13:11 2024    07:25 10/9/2024    09:07   CMP   Glucose 95   119    BUN 17   13    Creatinine 1.01  1.17  0.92    EGFR  72.7  97.0    Sodium 140   139    Potassium 4.7  4.0  3.9    Chloride 100   102    Calcium 9.7   9.9    Total Protein 7.7      Total Protein   7.8    Albumin 4.6   4.4    Globulin 3.1      Globulin   3.4    Total Bilirubin " 0.4   0.5    Alkaline Phosphatase 67   68    AST (SGOT) 30   30    ALT (SGPT) 40   35    Albumin/Globulin Ratio   1.3    BUN/Creatinine Ratio 17   14.1    Anion Gap   11.0           EGD: GEJ 41 cm, + HH.  Orange colored mucosa.  Erosive esophagitis  Path- no Puga's    GES: normal         Cardiac clearance:low risk    Pulm clearance: moderate risk        Last tobacco: 20 years  Last NSAIDs: Voltaren this morning.  Last ASA: none receny  Last steroids: 9/2024 for rash  Last hormones: n/a         Past Medical History:   Diagnosis Date    Arthritis     (R) hip, on Diclofenac + Norco    Aspirin long-term use     for routine prevention    BPH (benign prostatic hyperplasia)     Cataract, bilateral     Chronic bronchitis     prn inhaler/steroid use, does not follow w/ pulmonary    Dyspepsia     Dyspnea on exertion     Fatigue     Former tobacco use     quit dip 2023, quit smoking 2012    Heartburn     chronic/episodic, prn OTC PPIs, denies prior eval    History of being tatooed     Hyperlipidemia     Hypertension     Hypothyroidism     Impaired mobility     d/t chronic hip pain, ambulates w/ cane    Kidney stone     Morbid obesity with BMI of 50.0-59.9, adult     Peripheral edema     Seasonal allergies     Sleep apnea     CPAP compliant    Wears glasses     reading only     Past Surgical History:   Procedure Laterality Date    APPENDECTOMY OPEN  1976    CATARACT EXTRACTION W/ INTRAOCULAR LENS IMPLANT Right 07/26/2018    Procedure: CATARACT PHACO EXTRACTION WITH INTRAOCULAR LENS IMPLANT RIGHT;  Surgeon: Melquiades Campo MD;  Location: [facility] OR;  Service: Ophthalmology    CATARACT EXTRACTION W/ INTRAOCULAR LENS IMPLANT Left 08/09/2018    Procedure: CATARACT PHACO EXTRACTION WITH INTRAOCULAR LENS IMPLANT LEFT;  Surgeon: Melquiades Campo MD;  Location: [facility] OR;  Service: Ophthalmology    COLONOSCOPY N/A 07/28/2021    Procedure: COLONOSCOPY WITH POLYPECTOMY;  Surgeon: Jimmy Donohue MD;  Location: [facility] ENDOSCOPY;  Service:  "Gastroenterology;  Laterality: N/A;    EAR TUBES  1978    ENDOSCOPY N/A 8/19/2024    Procedure: ESOPHAGOGASTRODUODENOSCOPY WITH BIOPSY;  Surgeon: Leonila Reeves MD;  Location: [facility] ENDOSCOPY;  Service: General;  Laterality: N/A;    INCISION AND DRAINAGE ABSCESS  2008    \"INGROWN HAIR\" LEFT PUBIC AREA, required packing, hospitalized w/ IV abx 5 days.    TONSILLECTOMY AND ADENOIDECTOMY  1972       Allergies   Allergen Reactions    Sulfa Antibiotics Nausea And Vomiting       Current Outpatient Medications:     acetaminophen (TYLENOL) 650 MG 8 hr tablet, Take 1 tablet by mouth Every 8 (Eight) Hours As Needed for Mild Pain., Disp: , Rfl:     albuterol sulfate  (90 Base) MCG/ACT inhaler, Inhale 2 puffs Every 4 (Four) Hours As Needed for Wheezing., Disp: 6.7 g, Rfl: 5    atorvastatin (LIPITOR) 20 MG tablet, Take 1 tablet by mouth Daily., Disp: , Rfl:     cyclobenzaprine (FLEXERIL) 10 MG tablet, Take 0.5 tablets by mouth 2 (Two) Times a Day As Needed for Muscle Spasms., Disp: , Rfl:     diclofenac (VOLTAREN) 75 MG EC tablet, Take 1 tablet by mouth 2 (Two) Times a Day., Disp: , Rfl:     finasteride (PROSCAR) 5 MG tablet, Take 1 tablet by mouth Daily., Disp: , Rfl:     fluticasone (FLONASE) 50 MCG/ACT nasal spray, Administer 2 sprays into the nostril(s) as directed by provider Daily., Disp: , Rfl:     glimepiride (AMARYL) 1 MG tablet, Take 1 tablet by mouth Every Morning Before Breakfast., Disp: , Rfl:     HYDROcodone-acetaminophen (NORCO) 5-325 MG per tablet, Take 1 tablet by mouth Every 6 (Six) Hours As Needed., Disp: , Rfl:     levothyroxine (SYNTHROID, LEVOTHROID) 137 MCG tablet, Take 1 tablet by mouth Daily., Disp: , Rfl:     losartan (COZAAR) 100 MG tablet, Take 1 tablet by mouth Daily., Disp: , Rfl:     meclizine (ANTIVERT) 25 MG tablet, Take 1 tablet by mouth 3 (Three) Times a Day As Needed., Disp: , Rfl:     omeprazole (priLOSEC) 40 MG capsule, Take 1 capsule by mouth Daily for 360 days., Disp: 90 " capsule, Rfl: 3    tamsulosin (FLOMAX) 0.4 MG capsule 24 hr capsule, Take 1 capsule by mouth Daily., Disp: , Rfl:     aspirin  MG tablet, Take 1 tablet by mouth Daily. (Patient not taking: Reported on 10/14/2024), Disp: , Rfl:     spironolactone (ALDACTONE) 25 MG tablet, Take 1 tablet by mouth Daily. (Patient not taking: Reported on 10/14/2024), Disp: , Rfl:     Social History     Socioeconomic History    Marital status:    Tobacco Use    Smoking status: Former     Current packs/day: 0.00     Average packs/day: 0.5 packs/day for 28.0 years (14.0 ttl pk-yrs)     Types: Cigarettes     Start date: 1980     Quit date: 2008     Years since quittin.2     Passive exposure: Past    Smokeless tobacco: Former     Types: Snuff     Quit date:    Vaping Use    Vaping status: Never Used   Substance and Sexual Activity    Alcohol use: Yes     Comment: occ.    Drug use: No    Sexual activity: Defer     Social History     Social History Narrative    Lives in [place], [place].   with 3 adult children.  Works [employer] - .        Family History   Problem Relation Age of Onset    Hypertension Mother     Heart attack Mother     Heart disease Mother     Heart attack Father     Heart disease Father     COPD Father     Hypertension Maternal Grandmother     Asthma Maternal Grandmother     Hypertension Maternal Grandfather     Lung disease Maternal Grandfather         black lung    Obesity Paternal Grandfather     Hypertension Paternal Grandfather     Heart attack Paternal Grandfather     Heart disease Paternal Grandfather        Review of Systems    Physical Exam:  Vital Signs:  Weight: (!) 157 kg (346 lb)   Body mass index is 54.19 kg/m².  Temp: 97.3 °F (36.3 °C)   Heart Rate: 94   BP: 140/88     Physical Exam  Vitals reviewed.   Constitutional:       Appearance: He is well-developed.   HENT:      Head: Normocephalic and atraumatic.      Nose: Nose normal.   Eyes:       Conjunctiva/sclera: Conjunctivae normal.      Pupils: Pupils are equal, round, and reactive to light.   Neck:      Thyroid: No thyromegaly.      Vascular: No carotid bruit.      Trachea: No tracheal deviation.   Cardiovascular:      Rate and Rhythm: Normal rate and regular rhythm.      Heart sounds: Normal heart sounds.   Pulmonary:      Effort: Pulmonary effort is normal. No respiratory distress.      Breath sounds: Normal breath sounds.   Abdominal:      General: There is no distension.      Palpations: Abdomen is soft.      Tenderness: There is no abdominal tenderness.      Comments: Reducible umbilical hernia, RLQ incision   Musculoskeletal:         General: No deformity. Normal range of motion.      Cervical back: Normal range of motion and neck supple.   Skin:     General: Skin is warm and dry.      Findings: No rash.   Neurological:      Mental Status: He is alert and oriented to person, place, and time.      Cranial Nerves: No cranial nerve deficit.      Coordination: Coordination normal.      Comments: Ambulates with cane   Psychiatric:         Behavior: Behavior normal.         Thought Content: Thought content normal.         Judgment: Judgment normal.       Problem List Items Addressed This Visit       Sleep apnea    Overview     APAP compliant         Morbid obesity with BMI of 50.0-59.9, adult - Primary    Relevant Orders    Case Request (Completed)    MRSA Screen Culture (Outpatient) - Swab, Nares    Hypertension    Hyperlipidemia     Other Visit Diagnoses       Type 2 diabetes mellitus with obesity        Essential hypertension        Hiatal hernia        Relevant Orders    Case Request (Completed)    MRSA Screen Culture (Outpatient) - Swab, Nares            Assessment:    Sandra Garza is a 58 y.o. year old male with medically complicated obesity.    Weight loss surgery is deemed medically necessary given the following obesity related comorbidities including Oa, HLD,HTN, EVA, DM2   with  "current Weight: (!) 157 kg (346 lb) and Body mass index is 54.19 kg/m²..    Patient is aware that surgery is a tool, and that weight loss is not guaranteed but only seen in the context of appropriate use, follow up and exercise.    The patient was present for an approximately a 2.5 hour discussion of the purpose of weight loss surgery, how WLS is a tool to assist in achieving weight loss goals, the most common complications and how best to avoid them, and the strategies for short and long term weight loss.  Ample opportunity to discuss questions was available both in group and during the time of individual examination.    I reviewed all available preop labs, Xrays, tests, clearances, etc and signed off on these in the record.  All of this in addition to the patient's unique history and exam has been taken into consideration in determining their appropriate candidacy for weight loss surgery.    Complications  of laparoscopic/possible robotic gastric sleeve were discussed. The patient is well aware of the potential complications of surgery that include but not limited to bleeding, infections, deep venous thrombosis, pulmonary embolism, pulmonary complications such as pneumonia, cardiac events, hernias, small bowel obstruction, damage to the spleen or other organs, bowel injury, disfiguring scars, failure to lose weight, need for additional surgery, conversion to an open procedure, and death. Patient is also aware of complications which apply in this particular procedure that can include but are not limited to a \"leak\" at the staple line which in some instances may require conversion to gastric bypass.    The patient is aware if a hiatal hernia is encountered, it likely will be repaired.  R/B/A Rx to hiatal hernia repair were discussed as outlined in our long consent form.  Briefly risks in addition to those for LSG include recurrent hernia, FILIPPO, dysphagia, esophageal injury, pneumothorax, injury to the vagus nerves, " injury to the thoracic duct, aorta or vena cava.    Greater than 3 minutes was spent with the patient discussing avoiding all tobacco products and second hand smoke at least 2 weeks pre-operatively and 6 weeks post-operatively to minimize the risk of sleeve leak.  This included discussing the importance of avoiding even secondhand smoke as the risk of leak is increased.  Examples discussed:  I made it very clear that the patient understands they should avoid even riding in a car where someone has previously smoked in the last 2 weeks, living in a house where someone smokes (even if it's in a separate room/patio/attached garage, etc.) we discussed that they should not have a conversation with a group of people who are smoking even if it's outside.  They can be around wood burning fires and barbecue.  I told them I do not know if marijuana has a same effects but my overall recommendation is to avoid it for 2 weeks prior in 6 weeks after surgery.  They also are aware that nicotine may also increase the risk of leak and I strongly encouraged him to avoid that as well for 2 weeks prior in 6 weeks after surgery.    Discussed the risks, benefits and alternative therapies at great length as outlined in our extensive consent forms, consent videos, and educational teaching process under the direction of the center's .    A copy of the patient's signed informed consent is on file.    Plan:  Laparoscopic robotic assisted sleeve gastrectomy + HHR at [facility]      R/B/A Rx discussed to postop anticoagulation including but not limited to bleeding, drug reaction, venothromboembolic events, etc. and he is agreeable to taking 3 weeks of Lovenox.    MDM high:  Elective procedure with the following risk factors: morbid obesity, EVA, DM2, HTN  4+ chronic medical problems reviewed.    I spent 60 minutes caring for Sandra on this date of service. This time includes time spent by me in the following activities: preparing for  the visit, reviewing tests, performing a medically appropriate examination and/or evaluation, counseling and educating the patient/family/caregiver, referring and communicating with other health care professionals, documenting information in the medical record, independently interpreting results and communicating that information with the patient/family/caregiver, care coordination, ordering medications, ordering test(s), ordering procedure(s), obtaining a separately obtained history, and reviewing a separately obtained history.       Thank you Choco Mcmahon MD for allowing me to share in the care of our mutual patient.             Leonila Reeves MD

## 2024-10-15 PROBLEM — K44.9 HIATAL HERNIA: Status: ACTIVE | Noted: 2024-10-14

## 2024-10-24 ENCOUNTER — OFFICE VISIT (OUTPATIENT)
Dept: PULMONOLOGY | Facility: CLINIC | Age: 59
End: 2024-10-24
Payer: COMMERCIAL

## 2024-10-24 VITALS
HEIGHT: 67 IN | BODY MASS INDEX: 49.44 KG/M2 | RESPIRATION RATE: 18 BRPM | OXYGEN SATURATION: 96 % | WEIGHT: 315 LBS | DIASTOLIC BLOOD PRESSURE: 80 MMHG | SYSTOLIC BLOOD PRESSURE: 151 MMHG | HEART RATE: 87 BPM

## 2024-10-24 DIAGNOSIS — R06.09 DYSPNEA ON EXERTION: ICD-10-CM

## 2024-10-24 DIAGNOSIS — G47.33 OSA (OBSTRUCTIVE SLEEP APNEA): Primary | ICD-10-CM

## 2024-10-24 DIAGNOSIS — E66.01 MORBID OBESITY WITH BMI OF 50.0-59.9, ADULT: ICD-10-CM

## 2024-10-24 PROCEDURE — 99214 OFFICE O/P EST MOD 30 MIN: CPT | Performed by: NURSE PRACTITIONER

## 2024-10-24 RX ORDER — ALBUTEROL SULFATE 90 UG/1
2 INHALANT RESPIRATORY (INHALATION) EVERY 4 HOURS PRN
Qty: 18 G | Refills: 5 | Status: SHIPPED | OUTPATIENT
Start: 2024-10-24

## 2024-10-24 NOTE — PROGRESS NOTES
"Chief Complaint  Sleep Apnea    Subjective          Sandra Garza presents to [facility] PULMONARY & CRITICAL CARE MEDICINE for   History of Present Illness    Mr. Garza is a 58 year old male with a medical history significant for arthritis, hyperlipidemia, hypothyroidism, hypertension, and sleep apnea.    He presents today for follow up on sleep apnea.  He states that he has been doing well since his last visit.  He is compliant with use of cpap nightly.  He reports that he has difficulty using it when he is laying flat and that he has to lay on his side.  He continues to use albuterol as needed for shortness of breath.  He is scheduled for bariatric surgery on November 12th. He complains of some head congestion today that he has had for the last few weeks.    Objective   Vital Signs:   /80 (BP Location: Left arm, Patient Position: Sitting, Cuff Size: Adult)   Pulse 87   Resp 18   Ht 170.2 cm (67\")   Wt (!) 156 kg (343 lb 3.2 oz)   SpO2 96%   BMI 53.75 kg/m²         Physical Exam    GENERAL APPEARANCE: Well developed, well nourished, alert and cooperative, and appears to be in no acute distress.    HEAD: normocephalic. Atraumatic.    EYES: PERRL, EOMI. Vision is grossly intact.    THROAT: Oral cavity and pharynx normal. No inflammation, swelling, exudate, or lesions.     NECK: Neck supple.  No thyromegaly.    CARDIAC: Normal S1 and S2. No S3, S4 or murmurs. Rhythm is regular.     RESPIRATORY:Bilateral air entry positive.  No wheezing, crackles or rhonchi noted.    GI: Positive bowel sounds. Soft, nondistended, nontender.     MUSCULOSKELETAL: No significant deformity or joint abnormality. No edema. Peripheral pulses intact. No varicosities.    NEUROLOGICAL: Strength and sensation symmetric and intact throughout.     PSYCHIATRIC: The mental examination revealed the patient was oriented to person, place, and time.       Estimated body mass index is 53.75 kg/m² as calculated from " "the following:    Height as of this encounter: 170.2 cm (67\").    Weight as of this encounter: 156 kg (343 lb 3.2 oz).        Result Review :   The following data was reviewed by: MARIAELENA Clark on 10/24/2024:  Common labs          4/11/2024    13:11 8/19/2024    07:25 10/9/2024    09:07   Common Labs   Glucose 95   119    BUN 17   13    Creatinine 1.01  1.17  0.92    Sodium 140   139    Potassium 4.7  4.0  3.9    Chloride 100   102    Calcium 9.7   9.9    Total Protein 7.7      Albumin 4.6   4.4    Total Bilirubin 0.4   0.5    Alkaline Phosphatase 67   68    AST (SGOT) 30   30    ALT (SGPT) 40   35    WBC 11.6   11.92    Hemoglobin 14.9   13.9    Hematocrit 45.5   42.4    Platelets 286   332    Total Cholesterol 165      Triglycerides 258      HDL Cholesterol 36      LDL Cholesterol  86      Hemoglobin A1C 6.5             PFT:5/28/24        Low dose lung cancer screening:NA    Previous chest imaging:NA    Alpha-1 antitrypsin screening:NA    STOP-Bang Score:   NA  Culbertson Sleepiness Scale:   NA      ABG:    pH No results found for: \"PHART\"   pO2 No results found for: \"PO2ART\"   pCO2 No results found for: \"NFT9OWM\"   HCO3 No results found for: \"HKO8NME\"                      Assessment and Plan    Problem List Items Addressed This Visit          Cardiac and Vasculature    Dyspnea on exertion       Endocrine and Metabolic    Morbid obesity with BMI of 50.0-59.9, adult     Other Visit Diagnoses       EVA (obstructive sleep apnea)    -  Primary            Sandra Garza  reports that he quit smoking about 16 years ago. His smoking use included cigarettes. He started smoking about 44 years ago. He has a 14 pack-year smoking history. He has been exposed to tobacco smoke. He quit smokeless tobacco use about 21 months ago.  His smokeless tobacco use included snuff.      He is compliant with use of cpap nighty.  He is not able to use it when laying flat, that he has to sleep on his side.    He will go by " and get compliance report today.     Patient was educated on positive airway pressure treatment.  As per CMS guidelines, more than 4 hours on 70% of observed nights is considered adherence. Patient was strongly encouraged to use CPAP as much as possible during sleep as more CPAP use is equal to more benefit. Use of heated humidification in positive airway pressure treatment to improve the adherence to the device.  In case of claustrophobia, we will provide the patient cognitive behavioral therapy and desensitization. Oral appliances use will be discussed with the patient in case of mild to moderate sleep apnea or if the patient with severe disease fail positive airway pressure treatment.       The patient was extensively educated on the consequences of untreated obstructive sleep apnea namely cardiovascular/metabolic disorder, neurocognitive deficit, daytime sleepiness, motor vehicle accidents, depression, mood disorders and reduced quality of life.  At the end of conversation, the patient voices understanding of the disease process and treatment modality.  Patient also understands the risk of untreated obstructive sleep apnea and benefit benefits of the treatment.    Counseling time was greater than 10 minutes.      He is scheduled for bariatric surgery on November 12th.      He continues to use albuterol as needed. Sent refills.    We discussed diet and exercise.    Sent antibiotics for sinusitis.    Follow Up   Return in about 6 months (around 4/24/2025).  Patient was given instructions and counseling regarding his condition or for health maintenance advice. Please see specific information pulled into the AVS if appropriate.

## 2024-11-05 ENCOUNTER — PRE-ADMISSION TESTING (OUTPATIENT)
Dept: PREADMISSION TESTING | Facility: HOSPITAL | Age: 59
End: 2024-11-05
Payer: COMMERCIAL

## 2024-11-05 DIAGNOSIS — E66.01 MORBID OBESITY WITH BMI OF 50.0-59.9, ADULT: ICD-10-CM

## 2024-11-05 DIAGNOSIS — K44.9 HIATAL HERNIA: ICD-10-CM

## 2024-11-05 LAB
DEPRECATED RDW RBC AUTO: 44.5 FL (ref 37–54)
ERYTHROCYTE [DISTWIDTH] IN BLOOD BY AUTOMATED COUNT: 13.5 % (ref 12.3–15.4)
HBA1C MFR BLD: 6.1 % (ref 4.8–5.6)
HCT VFR BLD AUTO: 41.9 % (ref 37.5–51)
HGB BLD-MCNC: 14.1 G/DL (ref 13–17.7)
MCH RBC QN AUTO: 30.3 PG (ref 26.6–33)
MCHC RBC AUTO-ENTMCNC: 33.7 G/DL (ref 31.5–35.7)
MCV RBC AUTO: 89.9 FL (ref 79–97)
PLATELET # BLD AUTO: 301 10*3/MM3 (ref 140–450)
PMV BLD AUTO: 10.4 FL (ref 6–12)
POTASSIUM SERPL-SCNC: 4.3 MMOL/L (ref 3.5–5.2)
RBC # BLD AUTO: 4.66 10*6/MM3 (ref 4.14–5.8)
WBC NRBC COR # BLD AUTO: 9.75 10*3/MM3 (ref 3.4–10.8)

## 2024-11-05 PROCEDURE — 87081 CULTURE SCREEN ONLY: CPT

## 2024-11-05 PROCEDURE — 83036 HEMOGLOBIN GLYCOSYLATED A1C: CPT

## 2024-11-05 PROCEDURE — 84132 ASSAY OF SERUM POTASSIUM: CPT

## 2024-11-05 PROCEDURE — 36415 COLL VENOUS BLD VENIPUNCTURE: CPT

## 2024-11-05 PROCEDURE — 85027 COMPLETE CBC AUTOMATED: CPT

## 2024-11-05 PROCEDURE — 93005 ELECTROCARDIOGRAM TRACING: CPT

## 2024-11-05 NOTE — PAT
An arrival time for procedure was not provided during PAT visit. If patient had any questions or concerns about their arrival time, they were instructed to contact their surgeon/physician.  Additionally, if the patient referred to an arrival time that was acquired from their my chart account, patient was encouraged to verify that time with their surgeon/physician. Arrival times are NOT provided in Pre Admission Testing Department.    Patient viewed general PAT education video as instructed in their preoperative information received from their surgeon.  Patient stated the general PAT education video was viewed in its entirety and survey completed.  Copies of PAT general education handouts (Incentive Spirometry, Meds to Beds Program, Patient Belongings, Pre-op skin preparation instructions, Blood Glucose testing, Visitor policy, Surgery FAQ, Code H) distributed to patient if not printed. Education related to the PAT pass and skin preparation for surgery (if applicable) completed in PAT as a reinforcement to PAT education video. Patient instructed to return PAT pass provided today as well as completed skin preparation sheet (if applicable) on the day of procedure.     Additionally if patient had not viewed video yet but intended to view it at home or in our waiting area, then referred them to the handout with QR code/link provided during PAT visit.  Encouraged patient/family to read PAT general education handouts thoroughly and notify PAT staff with any questions or concerns. Patient verbalized understanding of all information and priority content.    Per Anesthesia Request, patient instructed not to take their ACE/ARB medications on the AM of surgery.    Patient denies any current skin issues.     Verified patient previously completed cardiology visit for cardiac risk assessment in preparation for upcoming procedure, completion of 12-lead ECG within six months, and risk assessment letter reviewed. No further  interventions required.       Patient to apply Chlorhexadine wipes  to surgical area (as instructed) the night before procedure and the AM of procedure. Wipes provided.    Patient instructed to bring CPAP mask and tubing to the hospital for overnight stay.  Explained that it is not necessary to bring their CPAP machine to the hospital instead a CPAP machine will be provided for use by the hospital. If patient knows their CPAP settings, those settings will be implemented.  If not, the CPAP machine will be utilized on the auto setting using their mask and tubing.    Patient verbalized understanding.    Patient instructed to drink 20 ounces of Gatorade or Gatorlyte (if diabetic) and it needs to be completed 1 hour (for Main OR patients) or 2 hours (scheduled  section & [facility] patients) before given arrival time for procedure (NO RED Gatorade and NO Gatorade Zero).    Patient verbalized understanding.

## 2024-11-06 LAB
MRSA SPEC QL CULT: NORMAL
QT INTERVAL: 394 MS
QTC INTERVAL: 439 MS

## 2024-11-18 ENCOUNTER — ANESTHESIA EVENT (OUTPATIENT)
Dept: PERIOP | Facility: HOSPITAL | Age: 59
End: 2024-11-18
Payer: COMMERCIAL

## 2024-11-18 RX ORDER — SODIUM CHLORIDE, SODIUM LACTATE, POTASSIUM CHLORIDE, CALCIUM CHLORIDE 600; 310; 30; 20 MG/100ML; MG/100ML; MG/100ML; MG/100ML
9 INJECTION, SOLUTION INTRAVENOUS CONTINUOUS
Status: CANCELLED | OUTPATIENT
Start: 2024-11-19 | End: 2024-11-19

## 2024-11-18 RX ORDER — SODIUM CHLORIDE 0.9 % (FLUSH) 0.9 %
10 SYRINGE (ML) INJECTION AS NEEDED
Status: CANCELLED | OUTPATIENT
Start: 2024-11-18

## 2024-11-18 RX ORDER — FAMOTIDINE 10 MG/ML
20 INJECTION, SOLUTION INTRAVENOUS ONCE
Status: CANCELLED | OUTPATIENT
Start: 2024-11-18 | End: 2024-11-18

## 2024-11-18 RX ORDER — SODIUM CHLORIDE 0.9 % (FLUSH) 0.9 %
10 SYRINGE (ML) INJECTION EVERY 12 HOURS SCHEDULED
Status: CANCELLED | OUTPATIENT
Start: 2024-11-18

## 2024-11-19 ENCOUNTER — ANESTHESIA (OUTPATIENT)
Dept: PERIOP | Facility: HOSPITAL | Age: 59
End: 2024-11-19
Payer: COMMERCIAL

## 2024-11-19 ENCOUNTER — HOSPITAL ENCOUNTER (INPATIENT)
Facility: HOSPITAL | Age: 59
LOS: 1 days | Discharge: HOME | End: 2024-11-20
Attending: SURGERY | Admitting: SURGERY
Payer: COMMERCIAL

## 2024-11-19 ENCOUNTER — ANESTHESIA EVENT CONVERTED (OUTPATIENT)
Dept: ANESTHESIOLOGY | Facility: HOSPITAL | Age: 59
End: 2024-11-19
Payer: COMMERCIAL

## 2024-11-19 DIAGNOSIS — E66.01 MORBID OBESITY: Primary | ICD-10-CM

## 2024-11-19 DIAGNOSIS — K44.9 HIATAL HERNIA: ICD-10-CM

## 2024-11-19 DIAGNOSIS — E66.01 MORBID OBESITY WITH BMI OF 50.0-59.9, ADULT: ICD-10-CM

## 2024-11-19 LAB
ALBUMIN SERPL-MCNC: 4.1 G/DL (ref 3.5–5.2)
ALBUMIN/GLOB SERPL: 1.2 G/DL
ALP SERPL-CCNC: 70 U/L (ref 39–117)
ALT SERPL W P-5'-P-CCNC: 70 U/L (ref 1–41)
ANION GAP SERPL CALCULATED.3IONS-SCNC: 14 MMOL/L (ref 5–15)
AST SERPL-CCNC: 64 U/L (ref 1–40)
BILIRUB SERPL-MCNC: 0.4 MG/DL (ref 0–1.2)
BUN SERPL-MCNC: 11 MG/DL (ref 6–20)
BUN/CREAT SERPL: 14.1 (ref 7–25)
CALCIUM SPEC-SCNC: 8.8 MG/DL (ref 8.6–10.5)
CHLORIDE SERPL-SCNC: 103 MMOL/L (ref 98–107)
CO2 SERPL-SCNC: 20 MMOL/L (ref 22–29)
CREAT SERPL-MCNC: 0.78 MG/DL (ref 0.76–1.27)
EGFRCR SERPLBLD CKD-EPI 2021: 104 ML/MIN/1.73
GLOBULIN UR ELPH-MCNC: 3.3 GM/DL
GLUCOSE BLDC GLUCOMTR-MCNC: 104 MG/DL (ref 70–130)
GLUCOSE BLDC GLUCOMTR-MCNC: 130 MG/DL (ref 70–130)
GLUCOSE BLDC GLUCOMTR-MCNC: 145 MG/DL (ref 70–130)
GLUCOSE SERPL-MCNC: 160 MG/DL (ref 65–99)
POTASSIUM SERPL-SCNC: 3.9 MMOL/L (ref 3.5–5.2)
PROT SERPL-MCNC: 7.4 G/DL (ref 6–8.5)
SODIUM SERPL-SCNC: 137 MMOL/L (ref 136–145)

## 2024-11-19 PROCEDURE — 25010000002 HYDROMORPHONE 1 MG/ML SOLUTION

## 2024-11-19 PROCEDURE — 80053 COMPREHEN METABOLIC PANEL: CPT | Performed by: SURGERY

## 2024-11-19 PROCEDURE — 47001 NDL BIOPSY LVR TM OTH MAJ PX: CPT | Performed by: SURGERY

## 2024-11-19 PROCEDURE — 43775 LAP SLEEVE GASTRECTOMY: CPT | Performed by: SURGERY

## 2024-11-19 PROCEDURE — 94799 UNLISTED PULMONARY SVC/PX: CPT

## 2024-11-19 PROCEDURE — 25010000002 FENTANYL CITRATE (PF) 100 MCG/2ML SOLUTION: Performed by: STUDENT IN AN ORGANIZED HEALTH CARE EDUCATION/TRAINING PROGRAM

## 2024-11-19 PROCEDURE — 0BQT4ZZ REPAIR DIAPHRAGM, PERCUTANEOUS ENDOSCOPIC APPROACH: ICD-10-PCS | Performed by: SURGERY

## 2024-11-19 PROCEDURE — 8E0W4CZ ROBOTIC ASSISTED PROCEDURE OF TRUNK REGION, PERCUTANEOUS ENDOSCOPIC APPROACH: ICD-10-PCS | Performed by: SURGERY

## 2024-11-19 PROCEDURE — 25010000002 BUPIVACAINE (PF) 0.25 % SOLUTION

## 2024-11-19 PROCEDURE — 25010000002 ENOXAPARIN PER 10 MG

## 2024-11-19 PROCEDURE — 88313 SPECIAL STAINS GROUP 2: CPT | Performed by: SURGERY

## 2024-11-19 PROCEDURE — 25010000002 ENOXAPARIN PER 10 MG: Performed by: SURGERY

## 2024-11-19 PROCEDURE — 88307 TISSUE EXAM BY PATHOLOGIST: CPT | Performed by: SURGERY

## 2024-11-19 PROCEDURE — 25010000002 THIAMINE HCL 200 MG/2ML SOLUTION: Performed by: SURGERY

## 2024-11-19 PROCEDURE — 82948 REAGENT STRIP/BLOOD GLUCOSE: CPT

## 2024-11-19 PROCEDURE — 25810000003 LACTATED RINGERS PER 1000 ML: Performed by: SURGERY

## 2024-11-19 PROCEDURE — 25010000002 CEFAZOLIN 3 G RECONSTITUTED SOLUTION 1 EACH VIAL: Performed by: SURGERY

## 2024-11-19 PROCEDURE — 43775 LAP SLEEVE GASTRECTOMY: CPT | Performed by: PHYSICIAN ASSISTANT

## 2024-11-19 PROCEDURE — 25010000002 AMISULPRIDE (ANTIEMETIC) 5 MG/2ML SOLUTION: Performed by: STUDENT IN AN ORGANIZED HEALTH CARE EDUCATION/TRAINING PROGRAM

## 2024-11-19 PROCEDURE — 25010000002 HYDROMORPHONE PER 4 MG: Performed by: SURGERY

## 2024-11-19 PROCEDURE — 0DB64Z3 EXCISION OF STOMACH, PERCUTANEOUS ENDOSCOPIC APPROACH, VERTICAL: ICD-10-PCS | Performed by: SURGERY

## 2024-11-19 PROCEDURE — 25010000002 GLUCAGON (RDNA) PER 1 MG: Performed by: STUDENT IN AN ORGANIZED HEALTH CARE EDUCATION/TRAINING PROGRAM

## 2024-11-19 PROCEDURE — 25010000002 FENTANYL CITRATE (PF) 50 MCG/ML SOLUTION

## 2024-11-19 PROCEDURE — 25010000002 ONDANSETRON PER 1 MG: Performed by: ANESTHESIOLOGY

## 2024-11-19 PROCEDURE — 25810000003 SODIUM CHLORIDE 0.9 % SOLUTION: Performed by: SURGERY

## 2024-11-19 PROCEDURE — 25010000002 LIDOCAINE PF 1% 1 % SOLUTION: Performed by: ANESTHESIOLOGY

## 2024-11-19 PROCEDURE — 25010000002 SUGAMMADEX 200 MG/2ML SOLUTION: Performed by: ANESTHESIOLOGY

## 2024-11-19 PROCEDURE — 94660 CPAP INITIATION&MGMT: CPT

## 2024-11-19 PROCEDURE — 0FB24ZX EXCISION OF LEFT LOBE LIVER, PERCUTANEOUS ENDOSCOPIC APPROACH, DIAGNOSTIC: ICD-10-PCS | Performed by: SURGERY

## 2024-11-19 PROCEDURE — 25010000002 DEXAMETHASONE SODIUM PHOSPHATE 10 MG/ML SOLUTION: Performed by: STUDENT IN AN ORGANIZED HEALTH CARE EDUCATION/TRAINING PROGRAM

## 2024-11-19 PROCEDURE — 25010000002 LIDOCAINE PF 1% 1 % SOLUTION: Performed by: STUDENT IN AN ORGANIZED HEALTH CARE EDUCATION/TRAINING PROGRAM

## 2024-11-19 PROCEDURE — 25010000002 METOCLOPRAMIDE PER 10 MG: Performed by: SURGERY

## 2024-11-19 PROCEDURE — 25010000002 PROPOFOL 10 MG/ML EMULSION: Performed by: STUDENT IN AN ORGANIZED HEALTH CARE EDUCATION/TRAINING PROGRAM

## 2024-11-19 DEVICE — STAPLER 60 RELOAD BLUE
Type: IMPLANTABLE DEVICE | Site: STOMACH | Status: FUNCTIONAL
Brand: SUREFORM

## 2024-11-19 DEVICE — DEV CONTRL TISS STRATAFIX SPIRAL PDS PLS CT1 2-0 45CM: Type: IMPLANTABLE DEVICE | Site: STOMACH | Status: FUNCTIONAL

## 2024-11-19 DEVICE — STAPLER 60 RELOAD WHITE
Type: IMPLANTABLE DEVICE | Site: STOMACH | Status: FUNCTIONAL
Brand: SUREFORM

## 2024-11-19 RX ORDER — ATORVASTATIN CALCIUM 20 MG/1
20 TABLET, FILM COATED ORAL DAILY
Status: DISCONTINUED | OUTPATIENT
Start: 2024-11-20 | End: 2024-11-20 | Stop reason: HOSPADM

## 2024-11-19 RX ORDER — IBUPROFEN 600 MG/1
TABLET ORAL AS NEEDED
Status: DISCONTINUED | OUTPATIENT
Start: 2024-11-19 | End: 2024-11-19 | Stop reason: SURG

## 2024-11-19 RX ORDER — FLUTICASONE PROPIONATE 50 MCG
2 SPRAY, SUSPENSION (ML) NASAL DAILY
Status: DISCONTINUED | OUTPATIENT
Start: 2024-11-20 | End: 2024-11-20 | Stop reason: HOSPADM

## 2024-11-19 RX ORDER — GABAPENTIN 100 MG/1
100 CAPSULE ORAL 3 TIMES DAILY
Status: DISCONTINUED | OUTPATIENT
Start: 2024-11-19 | End: 2024-11-20 | Stop reason: HOSPADM

## 2024-11-19 RX ORDER — SCOLOPAMINE TRANSDERMAL SYSTEM 1 MG/1
1 PATCH, EXTENDED RELEASE TRANSDERMAL ONCE
Status: DISCONTINUED | OUTPATIENT
Start: 2024-11-19 | End: 2024-11-20 | Stop reason: HOSPADM

## 2024-11-19 RX ORDER — SIMETHICONE 80 MG
80 TABLET,CHEWABLE ORAL 4 TIMES DAILY PRN
Status: DISCONTINUED | OUTPATIENT
Start: 2024-11-19 | End: 2024-11-20 | Stop reason: HOSPADM

## 2024-11-19 RX ORDER — METOCLOPRAMIDE HYDROCHLORIDE 5 MG/ML
10 INJECTION INTRAMUSCULAR; INTRAVENOUS EVERY 6 HOURS PRN
Status: DISCONTINUED | OUTPATIENT
Start: 2024-11-19 | End: 2024-11-20 | Stop reason: HOSPADM

## 2024-11-19 RX ORDER — HYDROMORPHONE HYDROCHLORIDE 1 MG/ML
0.5 INJECTION, SOLUTION INTRAMUSCULAR; INTRAVENOUS; SUBCUTANEOUS
Status: DISCONTINUED | OUTPATIENT
Start: 2024-11-19 | End: 2024-11-19 | Stop reason: HOSPADM

## 2024-11-19 RX ORDER — HYDROMORPHONE HYDROCHLORIDE 1 MG/ML
0.5 INJECTION, SOLUTION INTRAMUSCULAR; INTRAVENOUS; SUBCUTANEOUS
Status: DISCONTINUED | OUTPATIENT
Start: 2024-11-19 | End: 2024-11-20 | Stop reason: HOSPADM

## 2024-11-19 RX ORDER — LOSARTAN POTASSIUM 50 MG/1
100 TABLET ORAL DAILY
Status: DISCONTINUED | OUTPATIENT
Start: 2024-11-19 | End: 2024-11-20 | Stop reason: HOSPADM

## 2024-11-19 RX ORDER — PROCHLORPERAZINE EDISYLATE 5 MG/ML
10 INJECTION INTRAMUSCULAR; INTRAVENOUS EVERY 6 HOURS PRN
Status: DISCONTINUED | OUTPATIENT
Start: 2024-11-19 | End: 2024-11-20 | Stop reason: HOSPADM

## 2024-11-19 RX ORDER — HYDROCODONE BITARTRATE AND ACETAMINOPHEN 5; 325 MG/1; MG/1
1 TABLET ORAL EVERY 6 HOURS PRN
Status: DISCONTINUED | OUTPATIENT
Start: 2024-11-19 | End: 2024-11-20 | Stop reason: HOSPADM

## 2024-11-19 RX ORDER — SODIUM CHLORIDE 9 MG/ML
40 INJECTION, SOLUTION INTRAVENOUS AS NEEDED
Status: DISCONTINUED | OUTPATIENT
Start: 2024-11-19 | End: 2024-11-20 | Stop reason: HOSPADM

## 2024-11-19 RX ORDER — ONDANSETRON 4 MG/1
4 TABLET, ORALLY DISINTEGRATING ORAL EVERY 6 HOURS PRN
Status: DISCONTINUED | OUTPATIENT
Start: 2024-11-19 | End: 2024-11-20 | Stop reason: HOSPADM

## 2024-11-19 RX ORDER — SODIUM CHLORIDE 0.9 % (FLUSH) 0.9 %
1-10 SYRINGE (ML) INJECTION AS NEEDED
Status: DISCONTINUED | OUTPATIENT
Start: 2024-11-19 | End: 2024-11-20 | Stop reason: HOSPADM

## 2024-11-19 RX ORDER — METOCLOPRAMIDE 10 MG/1
10 TABLET ORAL EVERY 6 HOURS PRN
Status: DISCONTINUED | OUTPATIENT
Start: 2024-11-19 | End: 2024-11-20 | Stop reason: HOSPADM

## 2024-11-19 RX ORDER — SODIUM CHLORIDE 9 MG/ML
150 INJECTION, SOLUTION INTRAVENOUS CONTINUOUS
Status: DISCONTINUED | OUTPATIENT
Start: 2024-11-19 | End: 2024-11-20 | Stop reason: HOSPADM

## 2024-11-19 RX ORDER — DEXMEDETOMIDINE HYDROCHLORIDE 100 UG/ML
INJECTION, SOLUTION INTRAVENOUS AS NEEDED
Status: DISCONTINUED | OUTPATIENT
Start: 2024-11-19 | End: 2024-11-19 | Stop reason: SURG

## 2024-11-19 RX ORDER — LIDOCAINE HYDROCHLORIDE 10 MG/ML
0.5 INJECTION, SOLUTION EPIDURAL; INFILTRATION; INTRACAUDAL; PERINEURAL ONCE AS NEEDED
Status: COMPLETED | OUTPATIENT
Start: 2024-11-19 | End: 2024-11-19

## 2024-11-19 RX ORDER — HYDRALAZINE HYDROCHLORIDE 20 MG/ML
10 INJECTION INTRAMUSCULAR; INTRAVENOUS
Status: DISCONTINUED | OUTPATIENT
Start: 2024-11-19 | End: 2024-11-20 | Stop reason: HOSPADM

## 2024-11-19 RX ORDER — SODIUM CHLORIDE, SODIUM LACTATE, POTASSIUM CHLORIDE, CALCIUM CHLORIDE 600; 310; 30; 20 MG/100ML; MG/100ML; MG/100ML; MG/100ML
150 INJECTION, SOLUTION INTRAVENOUS CONTINUOUS
Status: DISCONTINUED | OUTPATIENT
Start: 2024-11-19 | End: 2024-11-20 | Stop reason: HOSPADM

## 2024-11-19 RX ORDER — FENTANYL CITRATE 50 UG/ML
INJECTION, SOLUTION INTRAMUSCULAR; INTRAVENOUS
Status: COMPLETED
Start: 2024-11-19 | End: 2024-11-19

## 2024-11-19 RX ORDER — MIDAZOLAM HYDROCHLORIDE 1 MG/ML
1 INJECTION, SOLUTION INTRAMUSCULAR; INTRAVENOUS
Status: DISCONTINUED | OUTPATIENT
Start: 2024-11-19 | End: 2024-11-19 | Stop reason: HOSPADM

## 2024-11-19 RX ORDER — FENTANYL CITRATE 50 UG/ML
50 INJECTION, SOLUTION INTRAMUSCULAR; INTRAVENOUS
Status: DISCONTINUED | OUTPATIENT
Start: 2024-11-19 | End: 2024-11-19 | Stop reason: HOSPADM

## 2024-11-19 RX ORDER — DEXAMETHASONE SODIUM PHOSPHATE 10 MG/ML
INJECTION, SOLUTION INTRAMUSCULAR; INTRAVENOUS
Status: COMPLETED | OUTPATIENT
Start: 2024-11-19 | End: 2024-11-19

## 2024-11-19 RX ORDER — DIPHENHYDRAMINE HYDROCHLORIDE 50 MG/ML
25 INJECTION INTRAMUSCULAR; INTRAVENOUS EVERY 4 HOURS PRN
Status: DISCONTINUED | OUTPATIENT
Start: 2024-11-19 | End: 2024-11-20 | Stop reason: HOSPADM

## 2024-11-19 RX ORDER — ENOXAPARIN SODIUM 100 MG/ML
40 INJECTION SUBCUTANEOUS ONCE
Status: COMPLETED | OUTPATIENT
Start: 2024-11-19 | End: 2024-11-19

## 2024-11-19 RX ORDER — NALOXONE HCL 0.4 MG/ML
0.1 VIAL (ML) INJECTION
Status: DISCONTINUED | OUTPATIENT
Start: 2024-11-19 | End: 2024-11-20 | Stop reason: HOSPADM

## 2024-11-19 RX ORDER — ENOXAPARIN SODIUM 100 MG/ML
60 INJECTION SUBCUTANEOUS EVERY 12 HOURS
Status: DISCONTINUED | OUTPATIENT
Start: 2024-11-19 | End: 2024-11-20 | Stop reason: HOSPADM

## 2024-11-19 RX ORDER — ONDANSETRON 2 MG/ML
4 INJECTION INTRAMUSCULAR; INTRAVENOUS ONCE AS NEEDED
Status: DISCONTINUED | OUTPATIENT
Start: 2024-11-19 | End: 2024-11-19 | Stop reason: HOSPADM

## 2024-11-19 RX ORDER — FENTANYL CITRATE 50 UG/ML
INJECTION, SOLUTION INTRAMUSCULAR; INTRAVENOUS AS NEEDED
Status: DISCONTINUED | OUTPATIENT
Start: 2024-11-19 | End: 2024-11-19 | Stop reason: SURG

## 2024-11-19 RX ORDER — ALPRAZOLAM 0.25 MG/1
0.25 TABLET ORAL 2 TIMES DAILY PRN
Status: DISCONTINUED | OUTPATIENT
Start: 2024-11-19 | End: 2024-11-20 | Stop reason: HOSPADM

## 2024-11-19 RX ORDER — CYANOCOBALAMIN 1000 UG/ML
1000 INJECTION, SOLUTION INTRAMUSCULAR; SUBCUTANEOUS ONCE
Status: COMPLETED | OUTPATIENT
Start: 2024-11-20 | End: 2024-11-20

## 2024-11-19 RX ORDER — FAMOTIDINE 20 MG/1
20 TABLET, FILM COATED ORAL ONCE
Status: COMPLETED | OUTPATIENT
Start: 2024-11-19 | End: 2024-11-19

## 2024-11-19 RX ORDER — OXYCODONE HYDROCHLORIDE 5 MG/1
5 TABLET ORAL EVERY 6 HOURS PRN
Status: DISCONTINUED | OUTPATIENT
Start: 2024-11-19 | End: 2024-11-20 | Stop reason: HOSPADM

## 2024-11-19 RX ORDER — BUPIVACAINE HYDROCHLORIDE 2.5 MG/ML
INJECTION, SOLUTION EPIDURAL; INFILTRATION; INTRACAUDAL
Status: COMPLETED | OUTPATIENT
Start: 2024-11-19 | End: 2024-11-19

## 2024-11-19 RX ORDER — DEXAMETHASONE SODIUM PHOSPHATE 10 MG/ML
INJECTION, SOLUTION INTRAMUSCULAR; INTRAVENOUS AS NEEDED
Status: DISCONTINUED | OUTPATIENT
Start: 2024-11-19 | End: 2024-11-19 | Stop reason: SURG

## 2024-11-19 RX ORDER — PROCHLORPERAZINE MALEATE 10 MG
10 TABLET ORAL EVERY 6 HOURS PRN
Status: DISCONTINUED | OUTPATIENT
Start: 2024-11-19 | End: 2024-11-20 | Stop reason: HOSPADM

## 2024-11-19 RX ORDER — SODIUM CHLORIDE 0.9 % (FLUSH) 0.9 %
10 SYRINGE (ML) INJECTION EVERY 12 HOURS SCHEDULED
Status: DISCONTINUED | OUTPATIENT
Start: 2024-11-19 | End: 2024-11-20 | Stop reason: HOSPADM

## 2024-11-19 RX ORDER — ROCURONIUM BROMIDE 10 MG/ML
INJECTION, SOLUTION INTRAVENOUS AS NEEDED
Status: DISCONTINUED | OUTPATIENT
Start: 2024-11-19 | End: 2024-11-19 | Stop reason: SURG

## 2024-11-19 RX ORDER — ACETAMINOPHEN 160 MG/5ML
1000 SOLUTION ORAL EVERY 8 HOURS SCHEDULED
Status: DISCONTINUED | OUTPATIENT
Start: 2024-11-19 | End: 2024-11-20 | Stop reason: HOSPADM

## 2024-11-19 RX ORDER — ALBUTEROL SULFATE 90 UG/1
2 INHALANT RESPIRATORY (INHALATION) EVERY 4 HOURS PRN
Status: DISCONTINUED | OUTPATIENT
Start: 2024-11-19 | End: 2024-11-20 | Stop reason: HOSPADM

## 2024-11-19 RX ORDER — HYDROMORPHONE HYDROCHLORIDE 2 MG/1
2 TABLET ORAL EVERY 4 HOURS PRN
Status: DISCONTINUED | OUTPATIENT
Start: 2024-11-19 | End: 2024-11-20 | Stop reason: HOSPADM

## 2024-11-19 RX ORDER — LABETALOL HYDROCHLORIDE 5 MG/ML
10 INJECTION, SOLUTION INTRAVENOUS
Status: DISCONTINUED | OUTPATIENT
Start: 2024-11-19 | End: 2024-11-20 | Stop reason: HOSPADM

## 2024-11-19 RX ORDER — THIAMINE HYDROCHLORIDE 100 MG/ML
100 INJECTION, SOLUTION INTRAMUSCULAR; INTRAVENOUS ONCE
Status: COMPLETED | OUTPATIENT
Start: 2024-11-19 | End: 2024-11-19

## 2024-11-19 RX ORDER — INSULIN LISPRO 100 [IU]/ML
2-9 INJECTION, SOLUTION INTRAVENOUS; SUBCUTANEOUS
Status: DISCONTINUED | OUTPATIENT
Start: 2024-11-19 | End: 2024-11-20 | Stop reason: HOSPADM

## 2024-11-19 RX ORDER — FIBRINOGEN HUMAN, HUMAN THROMBIN 4 ML
KIT TOPICAL AS NEEDED
Status: DISCONTINUED | OUTPATIENT
Start: 2024-11-19 | End: 2024-11-19 | Stop reason: HOSPADM

## 2024-11-19 RX ORDER — ACETAMINOPHEN 500 MG
1000 TABLET ORAL EVERY 8 HOURS SCHEDULED
Status: DISCONTINUED | OUTPATIENT
Start: 2024-11-19 | End: 2024-11-20 | Stop reason: HOSPADM

## 2024-11-19 RX ORDER — FINASTERIDE 5 MG/1
5 TABLET, FILM COATED ORAL DAILY
Status: DISCONTINUED | OUTPATIENT
Start: 2024-11-20 | End: 2024-11-20 | Stop reason: HOSPADM

## 2024-11-19 RX ORDER — DEXTROSE MONOHYDRATE 25 G/50ML
25 INJECTION, SOLUTION INTRAVENOUS
Status: DISCONTINUED | OUTPATIENT
Start: 2024-11-19 | End: 2024-11-20 | Stop reason: HOSPADM

## 2024-11-19 RX ORDER — NICOTINE POLACRILEX 4 MG
15 LOZENGE BUCCAL
Status: DISCONTINUED | OUTPATIENT
Start: 2024-11-19 | End: 2024-11-20 | Stop reason: HOSPADM

## 2024-11-19 RX ORDER — ONDANSETRON 2 MG/ML
4 INJECTION INTRAMUSCULAR; INTRAVENOUS EVERY 6 HOURS PRN
Status: DISCONTINUED | OUTPATIENT
Start: 2024-11-19 | End: 2024-11-20 | Stop reason: HOSPADM

## 2024-11-19 RX ORDER — BUPIVACAINE HYDROCHLORIDE 5 MG/ML
INJECTION, SOLUTION EPIDURAL; INTRACAUDAL
Status: DISCONTINUED | OUTPATIENT
Start: 2024-11-19 | End: 2024-11-19

## 2024-11-19 RX ORDER — LEVOTHYROXINE SODIUM 137 UG/1
137 TABLET ORAL
Status: DISCONTINUED | OUTPATIENT
Start: 2024-11-20 | End: 2024-11-20 | Stop reason: HOSPADM

## 2024-11-19 RX ORDER — CYCLOBENZAPRINE HCL 10 MG
5 TABLET ORAL 2 TIMES DAILY PRN
Status: DISCONTINUED | OUTPATIENT
Start: 2024-11-19 | End: 2024-11-20 | Stop reason: HOSPADM

## 2024-11-19 RX ORDER — IBUPROFEN 600 MG/1
1 TABLET ORAL
Status: DISCONTINUED | OUTPATIENT
Start: 2024-11-19 | End: 2024-11-20 | Stop reason: HOSPADM

## 2024-11-19 RX ORDER — PROCHLORPERAZINE 25 MG
25 SUPPOSITORY, RECTAL RECTAL EVERY 12 HOURS PRN
Status: DISCONTINUED | OUTPATIENT
Start: 2024-11-19 | End: 2024-11-20 | Stop reason: HOSPADM

## 2024-11-19 RX ORDER — PROPOFOL 10 MG/ML
VIAL (ML) INTRAVENOUS AS NEEDED
Status: DISCONTINUED | OUTPATIENT
Start: 2024-11-19 | End: 2024-11-19 | Stop reason: SURG

## 2024-11-19 RX ORDER — ONDANSETRON 2 MG/ML
INJECTION INTRAMUSCULAR; INTRAVENOUS AS NEEDED
Status: DISCONTINUED | OUTPATIENT
Start: 2024-11-19 | End: 2024-11-19 | Stop reason: SURG

## 2024-11-19 RX ORDER — LIDOCAINE HYDROCHLORIDE 10 MG/ML
INJECTION, SOLUTION EPIDURAL; INFILTRATION; INTRACAUDAL; PERINEURAL AS NEEDED
Status: DISCONTINUED | OUTPATIENT
Start: 2024-11-19 | End: 2024-11-19 | Stop reason: SURG

## 2024-11-19 RX ORDER — CHLORHEXIDINE GLUCONATE ORAL RINSE 1.2 MG/ML
15 SOLUTION DENTAL
Status: COMPLETED | OUTPATIENT
Start: 2024-11-19 | End: 2024-11-19

## 2024-11-19 RX ORDER — MECLIZINE HYDROCHLORIDE 25 MG/1
25 TABLET ORAL 3 TIMES DAILY PRN
Status: DISCONTINUED | OUTPATIENT
Start: 2024-11-19 | End: 2024-11-20 | Stop reason: HOSPADM

## 2024-11-19 RX ORDER — GABAPENTIN 250 MG/5ML
100 SOLUTION ORAL 3 TIMES DAILY
Status: DISCONTINUED | OUTPATIENT
Start: 2024-11-19 | End: 2024-11-20 | Stop reason: HOSPADM

## 2024-11-19 RX ORDER — TAMSULOSIN HYDROCHLORIDE 0.4 MG/1
0.4 CAPSULE ORAL DAILY
Status: DISCONTINUED | OUTPATIENT
Start: 2024-11-20 | End: 2024-11-20 | Stop reason: HOSPADM

## 2024-11-19 RX ORDER — SODIUM CHLORIDE AND POTASSIUM CHLORIDE 150; 450 MG/100ML; MG/100ML
100 INJECTION, SOLUTION INTRAVENOUS CONTINUOUS
Status: DISCONTINUED | OUTPATIENT
Start: 2024-11-20 | End: 2024-11-20 | Stop reason: HOSPADM

## 2024-11-19 RX ORDER — PROMETHAZINE HYDROCHLORIDE 12.5 MG/1
12.5 TABLET ORAL EVERY 6 HOURS PRN
Status: DISCONTINUED | OUTPATIENT
Start: 2024-11-19 | End: 2024-11-20 | Stop reason: HOSPADM

## 2024-11-19 RX ORDER — PROMETHAZINE HYDROCHLORIDE 12.5 MG/1
12.5 SUPPOSITORY RECTAL EVERY 6 HOURS PRN
Status: DISCONTINUED | OUTPATIENT
Start: 2024-11-19 | End: 2024-11-20 | Stop reason: HOSPADM

## 2024-11-19 RX ADMIN — FENTANYL CITRATE 50 MCG: 50 INJECTION, SOLUTION INTRAMUSCULAR; INTRAVENOUS at 16:45

## 2024-11-19 RX ADMIN — DEXAMETHASONE SODIUM PHOSPHATE 4 MG: 10 INJECTION INTRAMUSCULAR; INTRAVENOUS at 14:11

## 2024-11-19 RX ADMIN — FENTANYL CITRATE 50 MCG: 50 INJECTION, SOLUTION INTRAMUSCULAR; INTRAVENOUS at 15:40

## 2024-11-19 RX ADMIN — FENTANYL CITRATE 50 MCG: 50 INJECTION, SOLUTION INTRAMUSCULAR; INTRAVENOUS at 14:05

## 2024-11-19 RX ADMIN — HYDROMORPHONE HYDROCHLORIDE 0.5 MG: 1 INJECTION, SOLUTION INTRAMUSCULAR; INTRAVENOUS; SUBCUTANEOUS at 16:56

## 2024-11-19 RX ADMIN — CHLORHEXIDINE GLUCONATE 15 ML: 1.2 SOLUTION ORAL at 12:35

## 2024-11-19 RX ADMIN — ROCURONIUM BROMIDE 15 MG: 50 INJECTION INTRAVENOUS at 15:30

## 2024-11-19 RX ADMIN — DEXAMETHASONE SODIUM PHOSPHATE 4 MG: 10 INJECTION, SOLUTION INTRAMUSCULAR; INTRAVENOUS at 14:15

## 2024-11-19 RX ADMIN — AMISULPRIDE 10 MG: 2.5 INJECTION, SOLUTION INTRAVENOUS at 15:39

## 2024-11-19 RX ADMIN — SODIUM CHLORIDE 1000 ML: 9 INJECTION, SOLUTION INTRAVENOUS at 12:35

## 2024-11-19 RX ADMIN — LIDOCAINE HYDROCHLORIDE 50 MG: 10 INJECTION, SOLUTION EPIDURAL; INFILTRATION; INTRACAUDAL; PERINEURAL at 14:05

## 2024-11-19 RX ADMIN — FAMOTIDINE 20 MG: 20 TABLET, FILM COATED ORAL at 12:35

## 2024-11-19 RX ADMIN — DEXMEDETOMIDINE HYDROCHLORIDE 10 MCG: 100 INJECTION, SOLUTION INTRAVENOUS at 14:11

## 2024-11-19 RX ADMIN — BUPIVACAINE HYDROCHLORIDE 60 ML: 2.5 INJECTION, SOLUTION EPIDURAL; INFILTRATION; INTRACAUDAL; PERINEURAL at 14:15

## 2024-11-19 RX ADMIN — SODIUM CHLORIDE 3 G: 900 INJECTION INTRAVENOUS at 14:13

## 2024-11-19 RX ADMIN — SODIUM CHLORIDE: 9 INJECTION, SOLUTION INTRAVENOUS at 13:57

## 2024-11-19 RX ADMIN — CHLORHEXIDINE GLUCONATE 15 ML: 1.2 SOLUTION ORAL at 12:40

## 2024-11-19 RX ADMIN — SUGAMMADEX 400 MG: 100 INJECTION, SOLUTION INTRAVENOUS at 16:20

## 2024-11-19 RX ADMIN — GLUCAGON 1 MG: KIT at 15:38

## 2024-11-19 RX ADMIN — THIAMINE HYDROCHLORIDE 100 MG: 100 INJECTION, SOLUTION INTRAMUSCULAR; INTRAVENOUS at 20:55

## 2024-11-19 RX ADMIN — ACETAMINOPHEN 1000 MG: 500 TABLET ORAL at 22:28

## 2024-11-19 RX ADMIN — FENTANYL CITRATE 50 MCG: 50 INJECTION, SOLUTION INTRAMUSCULAR; INTRAVENOUS at 17:08

## 2024-11-19 RX ADMIN — DEXMEDETOMIDINE HYDROCHLORIDE 20 MCG: 100 INJECTION, SOLUTION INTRAVENOUS at 16:16

## 2024-11-19 RX ADMIN — SODIUM CHLORIDE, SODIUM LACTATE, POTASSIUM CHLORIDE, CALCIUM CHLORIDE 150 ML/HR: 20; 30; 600; 310 INJECTION, SOLUTION INTRAVENOUS at 18:03

## 2024-11-19 RX ADMIN — GABAPENTIN 100 MG: 100 CAPSULE ORAL at 20:55

## 2024-11-19 RX ADMIN — METOCLOPRAMIDE 10 MG: 5 INJECTION, SOLUTION INTRAMUSCULAR; INTRAVENOUS at 20:14

## 2024-11-19 RX ADMIN — PROPOFOL 25 MCG/KG/MIN: 10 INJECTION, EMULSION INTRAVENOUS at 14:09

## 2024-11-19 RX ADMIN — SODIUM CHLORIDE 150 ML/HR: 9 INJECTION, SOLUTION INTRAVENOUS at 17:49

## 2024-11-19 RX ADMIN — LOSARTAN POTASSIUM 100 MG: 50 TABLET, FILM COATED ORAL at 20:55

## 2024-11-19 RX ADMIN — ENOXAPARIN SODIUM 60 MG: 60 INJECTION SUBCUTANEOUS at 22:28

## 2024-11-19 RX ADMIN — HYDROMORPHONE HYDROCHLORIDE 0.5 MG: 1 INJECTION, SOLUTION INTRAMUSCULAR; INTRAVENOUS; SUBCUTANEOUS at 16:40

## 2024-11-19 RX ADMIN — HYDROMORPHONE HYDROCHLORIDE 0.5 MG: 1 INJECTION, SOLUTION INTRAMUSCULAR; INTRAVENOUS; SUBCUTANEOUS at 20:12

## 2024-11-19 RX ADMIN — PROPOFOL 200 MG: 10 INJECTION, EMULSION INTRAVENOUS at 14:05

## 2024-11-19 RX ADMIN — SODIUM CHLORIDE 3000 MG: 900 INJECTION INTRAVENOUS at 22:28

## 2024-11-19 RX ADMIN — ONDANSETRON 4 MG: 2 INJECTION INTRAMUSCULAR; INTRAVENOUS at 16:05

## 2024-11-19 RX ADMIN — LIDOCAINE HYDROCHLORIDE 0.5 ML: 10 INJECTION, SOLUTION EPIDURAL; INFILTRATION; INTRACAUDAL; PERINEURAL at 12:35

## 2024-11-19 RX ADMIN — DEXMEDETOMIDINE HYDROCHLORIDE 20 MCG: 100 INJECTION, SOLUTION INTRAVENOUS at 15:55

## 2024-11-19 RX ADMIN — ROCURONIUM BROMIDE 100 MG: 50 INJECTION INTRAVENOUS at 14:05

## 2024-11-19 NOTE — OP NOTE
OPERATIVE REPORT    DATE: 11/19/24    PATIENT: Sandra Garza    PREOPERATIVE DIAGNOSIS:    1. Morbid obesity with comorbidities  2.  Hiatal hernia    POSTOPERATIVE DIAGNOSIS:    1. Morbid obesity with multiple comorbidities.  2.  Hiatal hernia  3.  Hepatomegaly with gross steatosis and fibrosis      PROCEDURES PERFORMED:  1. Robotic assisted laparoscopic sleeve gastrectomy (85% subtotal vertical gastrectomy) and type I sliding  hiatal hernia repair  2.  Laparoscopic Charan-Cut liver biopsy    SURGEON:  Leonila Reeves M.D.    ASSISTANT: JORGE Duenas was instrumental in the success of the case and provided retraction, suction, camera holding, and skin closure.    ANESTHESIA:  General endotracheal with TAP block    ESTIMATED BLOOD LOSS:   25 mL    FLUIDS:  Crystalloids.    SPECIMENS:  Subtotal gastrectomy, Hcaran-Cut liver biopsy    DRAINS:  None.    COUNTS:  Correct.    COMPLICATIONS:  None.    FINDINGS: Hepatomegaly with gross steatosis and fibrosis.  Small hiatal hernia.  Abundant visceral fat that rendered the case more challenging than usual.  Umbilical hernia containing adhesed omentum, not disturbed.    INDICATIONS:   Sandra Garza is a 58 y.o. year old male with morbid obesity and associated comorbidities who presents for elective laparoscopic sleeve gastrectomy. The patient has undergone our extensive preoperative education, teaching, and consent process. Everything is in order and they wish to proceed.         DESCRIPTION OF PROCEDURE:   The patient was brought to the operating room and placed supine upon the operating room table. SCDs were placed. The patient underwent uneventful general endotracheal anesthesia and bilateral TAP blocks per the anesthesiology staff.  He received subcutaneous Lovenox and was given Ancef. The abdomen was prepped with ChloraPrep and draped in the usual sterile fashion. An Ioban was used as well.  Timeout was performed.     A small transverse incision was made a  few centimeters above and to the left of the umbilicus and the peritoneal cavity entered under direct camera visualization using  a 10 mm 0° laparoscope and an OptiView trocar.  The abdomen was then insufflated to a pressure of 16 mmHg with CO2 gas. Exploratory laparoscopy revealed no evidence of injury from the entrance technique.  There was impressive hepatomegaly with gross steatosis and fibrosis.  There was abundant visceral fat that made visualization of other organs difficult.  There was an umbilical hernia containing adhesed omentum that was not disturbed.  Two 8 mm ports were placed to the patient's left, lateral of the first port, and a 12 mm port was placed to the patient's right.  The robot was docked, all safety checks were performed, and I moved to the robot console.     A 2-0 Stratafix suture was placed to make a liver sling with bites of the peritoneum and the right noah of the diaphragm, and the left lobe of the liver was elevated but the stitch immediately broke under the weight of the large liver.  I rescrubbed, and placed a Shreyas liver retractor through a small subxiphoid incision and retracted the left lobe of the liver.      A Visigi bougie was inserted and used to decompress the stomach. The greater curvature vessels were taken down with the vessel sealer energy device beginning at the midpoint of the stomach and continued up to the angle of His, including the short gastrics. The left noah was completely exposed and there was a hiatal hernia seen posteriorly. This was photodocumented. The GE junction fat pad was extremely larger was elevated then amputated and removed to make a clear landing zone for the stapler later. The greater curvature vessels were taken down with the energy device extending distally within 3 cm of the pylorus. Filmy adhesions to the stomach were taken down posteriorly.     I dissected the left noah with the vessel sealer, then divided the pars flaccida.  There was no  replaced hepatic vessel in the gastrohepatic ligament.  I dissected the right noah, then passed a Penrose drain around the esophagus for retraction.  The phrenoesophageal ligament was divided.  The posterior vagus trunk was obvious and was carefully preserved.  Once the hiatus was completely dissected, I performed a posterior cruroplasty with running nonabsorbable 2-0 V-Loc suture.  The Penrose was removed.     The 36 Nepali Visigi bougie was positioned along the lesser curvature of the stomach.  The stomach was marked at 1 cm from the angle of His, 3 cm from the incisura, and 5 cm from the pylorus using an ink saturated Kittner.  1 mg of IV glucagon was given to relax gastric smooth muscle.  Using the bougie as a template, a vertical sleeve gastrectomy was fashioned with successive staple loads: the first load was blue, and the following 5 loads were white.   The staple line was examined and was hemostatic. The gastrectomy specimen, which was larger than usual, was removed through the 12 mm port site. The specimen was later examined, and the staples were well-formed. Palpation of the specimen revealed no masses. The staple line of the sleeve gastrectomy revealed staples that were well-formed. The upper abdomen was flooded with saline, and a leak was performed by insufflating the stomach through the bougie. The leak test was normal.  The insufflated stomach was observed to be lying nicely without twist or stricture, and was appropriately sized.              I rescrubbed and suctioned the irrigation fluid from the upper abdomen, making sure it was dry. The staple line on the stomach was hemostatic.  The staple line was treated with 4 mL of aerosolized Tisseel fibrin glue.     An 18 gauge Charan-Cut needle was inserted through the subxiphoid incision.  A core needle biopsy was taken from the left lobe of the liver.  Hemostasis was obtained immediately with cautery.  The specimen was examined and was adequate.  The liver  retractor was removed easily. The 12 mm port was removed under direct visualization and there was no bleeding. This incision was closed with 0-Vicryl transfascial suture using an M Close device. All other ports were removed and then replaced under direct visualization and all of these were hemostatic. The instruments were removed and the abdomen was desufflated. The ports were removed.  3-0 Monocryl plus was used to close skin incisions with interrupted sutures. Skin glue was placed. 10 mg of IV Barhemsys was given at the end of the case.  The patient was awakened and taken to recovery in good condition, having tolerated the procedure well. All sponge, needle, and instrument counts were correct.

## 2024-11-19 NOTE — ANESTHESIA PROCEDURE NOTES
"Subcostal TAP      Patient reassessed immediately prior to procedure    Patient location during procedure: OR  Reason for block: at surgeon's request and post-op pain management  Performed by  CRNA/CAA: Jose C Stephens CRNASRNA: Will Keith SRNA  Preanesthetic Checklist  Completed: patient identified, IV checked, site marked, risks and benefits discussed, surgical consent, monitors and equipment checked, pre-op evaluation and timeout performed  Prep:  Pt Position: supine  Sterile barriers:cap, gloves, mask and washed/disinfected hands  Prep: ChloraPrep  Patient monitoring: blood pressure monitoring, continuous pulse oximetry and EKG  Procedure    Sedation: yes  Performed under: general  Guidance:ultrasound guided  Images:still images obtained, printed/placed on chart    Laterality:Bilateral  Block Type:TAP  Injection Technique:single-shot  Needle Type:short-bevel and echogenic  Needle Gauge:20 G  Resistance on Injection: none    Medications Used: bupivacaine PF (MARCAINE) 0.25 % injection - Injection   60 mL - 11/19/2024 2:15:00 PM  dexamethasone sodium phosphate injection - Injection, Transversus Abdominus Plane   4 mg - 11/19/2024 2:15:00 PM      Medications  Comment:Block Injection:  LA dose divided between Right and Left block        Post Assessment  Injection Assessment: negative aspiration for heme, incremental injection and no paresthesia on injection  Patient Tolerance:comfortable throughout block  Complications:no  Additional Notes    Subcostal TAPs    A high-frequency linear transducer, with sterile cover, was placed sub-xiphoid to identify Linea Alba, right and left Rectus Abdominus Muscles (DEL). The transducer was moved either right or left subcostally to identify the DEL and the Transverse Abdominus Muscle (EDMONDSON). The insertion site was prepped in sterile fashion and then localized with 2-5 ml of 1% Lidocaine. Using ultrasound-guidance, a 20-gauge B-Day 4\" Ultraplex 360 non-stimulating echogenic " needle was advanced in plane, from medial to lateral, until the tip of the needle was in the fascial plane between the DEL and EDMONDSON. 1-3ml of preservative free normal saline was used to hydro-dissect the fascial planes. After the fascial plane was verified, the local anesthetic (LA) was injected. The procedure was repeated on the opposite side for bilateral coverage. Aspiration every 5 ml to prevent intravascular injection. Injection was completed with negative aspiration of blood and negative intravascular injection. Injection pressures were normal with minimal resistance. The subcostal approach to the TAP nerve block ideally anesthetizes the intercostal nerves T6-T9.    Performed by: Jose C Stephens CRNA

## 2024-11-19 NOTE — ANESTHESIA PROCEDURE NOTES
Airway  Urgency: elective    Date/Time: 11/19/2024 2:09 PM  Airway not difficult    General Information and Staff    Patient location during procedure: OR  SRNA: Will Keiht SRNA  Indications and Patient Condition  Indications for airway management: airway protection    Preoxygenated: yes  MILS not maintained throughout  Mask difficulty assessment: 2 - vent by mask + OA or adjuvant +/- NMBA    Final Airway Details  Final airway type: endotracheal airway      Successful airway: ETT  Cuffed: yes   Successful intubation technique: direct laryngoscopy  Facilitating devices/methods: intubating stylet  Endotracheal tube insertion site: oral  Blade: Rubi  Blade size: 4  ETT size (mm): 7.5  Cormack-Lehane Classification: grade I - full view of glottis  Placement verified by: chest auscultation and capnometry   Cuff volume (mL): 8  Measured from: teeth  ETT/EBT  to teeth (cm): 23  Number of attempts at approach: 1  Assessment: lips, teeth, and gum same as pre-op and atraumatic intubation    Additional Comments  Negative epigastric sounds, Breath sound equal bilaterally with symmetric chest rise and fall

## 2024-11-19 NOTE — ANESTHESIA PREPROCEDURE EVALUATION
Anesthesia Evaluation     Patient summary reviewed and Nursing notes reviewed                Airway   Mallampati: III  TM distance: >3 FB  Neck ROM: full  Possible difficult intubation  Dental - normal exam   (+) poor dentition    Pulmonary - normal exam   (+) a smoker (2008) Former,sleep apnea on CPAP  Cardiovascular - normal exam    (+) hypertension, hyperlipidemia    ROS comment:   Echo 6/24: normal EF, no significant valve disease    Stress test 6/24: normal EF, no ischemia, low risk study    Neuro/Psych- negative ROS  GI/Hepatic/Renal/Endo    (+) morbid obesity, GERD, renal disease- CRI, diabetes mellitus, thyroid problem hypothyroidism    Musculoskeletal (-) negative ROS    Abdominal  - normal exam    Bowel sounds: normal.   Substance History - negative use     OB/GYN negative ob/gyn ROS         Other                      Anesthesia Plan    ASA 3     general with block     intravenous induction     Anesthetic plan, risks, benefits, and alternatives have been provided, discussed and informed consent has been obtained with: patient.    Plan discussed with CRNA.    CODE STATUS:

## 2024-11-19 NOTE — ANESTHESIA POSTPROCEDURE EVALUATION
Patient: Sandra Garza    Procedure Summary       Date: 11/19/24 Room / Location:  [facility]  [facility]    Anesthesia Start: 1356 Anesthesia Stop: 1637    Procedures:       GASTRIC SLEEVE WITH HIATAL HERNIA LAPAROSCOPIC WITH DAVINCI ROBOT (Abdomen)      LIVER BIOPSY LAPAROSCOPIC (Abdomen)      ESOPHAGOGASTRODUODENOSCOPY (Esophagus) Diagnosis:       Morbid obesity with BMI of 50.0-59.9, adult      Hiatal hernia      (Morbid obesity with BMI of 50.0-59.9, adult [E66.01, Z68.43])      (Hiatal hernia [K44.9])    Surgeons: Leonila Reeves MD Provider: Jourdan Eckert MD    Anesthesia Type: general with block ASA Status: 3            Anesthesia Type: general with block    Vitals  Vitals Value Taken Time   BP     Temp     Pulse 80 11/19/24 1637   Resp     SpO2 98 % 11/19/24 1637   Vitals shown include unfiled device data.        Post Anesthesia Care and Evaluation    Patient location during evaluation: PACU  Patient participation: complete - patient participated  Level of consciousness: awake and alert  Pain management: adequate    Airway patency: patent  Anesthetic complications: No anesthetic complications  PONV Status: none  Cardiovascular status: hemodynamically stable and acceptable  Respiratory status: nonlabored ventilation, acceptable and nasal cannula  Hydration status: acceptable

## 2024-11-19 NOTE — H&P
"[facility] BARIATRIC SURGERY  [address] [facility] [address]  [phone]      Patient  Name:  Sandra Garza  :  1965      Date of Visit: 2024      Chief Complaint:  weight gain; unable to maintain weight loss    History of Present Illness:  Sandra Garza is a 58 y.o. male who presents today for evaluation, education and consultation regarding weight loss surgery.     Patient has been overweight for many years, with numerous failed dietary/weight loss attempts.  He now has obesity related comorbidities of Oa, HLD,HTN, EVA, DM2  and as such has decided to pursue weight loss surgery.    From intake:  \"  Intake TSH abnL - meds adjusted.      GES 24 @[facility] - unremarkable.       Cardiac Clearance obtained 24.     Pulmonary Eval 24 - now using CPAP faithfully.  \"    2024 Update:    Reports I operated on his supervisor, Joceline.    Started PPI since I rx after EGD for erosive esophagitis.  Reporst it has really helped his stomach after taking his NSAID    He takes Norco 5 bid tab baseline for chronic pain.    The patient lives in [place], and drives school bus, , bridges.    No personal or family hx of VTE or clotting d/o.  No liver, lung, heart, or renal disease    Review of data:     PORTILLO: monthly Norco  HP neg    CBC          2024    13:11 10/9/2024    09:07 2024    08:44   CBC   WBC 11.6  11.92  9.75    RBC 5.03  4.60  4.66    Hemoglobin 14.9  13.9  14.1    Hematocrit 45.5  42.4  41.9    MCV 91  92.2  89.9    MCH 29.6  30.2  30.3    MCHC 32.7  32.8  33.7    RDW 13.3  13.0  13.5    Platelets 286  332  301      CMP          2024    07:25 10/9/2024    09:07 2024    08:44   CMP   Glucose  119     BUN  13     Creatinine 1.17  0.92     EGFR 72.7  97.0     Sodium  139     Potassium 4.0  3.9  4.3    Chloride  102     Calcium  9.9     Total Protein  7.8     Albumin  4.4     Globulin  3.4     Total Bilirubin  0.5  "    Alkaline Phosphatase  68     AST (SGOT)  30     ALT (SGPT)  35     Albumin/Globulin Ratio  1.3     BUN/Creatinine Ratio  14.1     Anion Gap  11.0            EGD: GEJ 41 cm, + HH.  [place] colored mucosa.  Erosive esophagitis  Path- no P[place]'s    GES: normal         Cardiac clearance:low risk    Pulm clearance: moderate risk        Last tobacco: 20 years  Last NSAIDs: Voltaren this morning.  Last ASA: none receny  Last steroids: 9/2024 for rash  Last hormones: n/a         Past Medical History:   Diagnosis Date    Arthritis     (R) hip, on Diclofenac + Norco    Aspirin long-term use     for routine prevention    BPH (benign prostatic hyperplasia)     Cataract, bilateral     Chronic bronchitis     prn inhaler/steroid use, does not follow w/ pulmonary    Diabetes mellitus     Dyspepsia     Dyspnea on exertion     Fatigue     Former tobacco use     quit dip 2023, quit smoking 2012    Heartburn     chronic/episodic, prn OTC PPIs, denies prior eval    History of being tatooed     Hyperlipidemia     Hypertension     Hypothyroidism     Impaired mobility     d/t chronic hip pain, ambulates w/ cane    Kidney stone     Morbid obesity with BMI of 50.0-59.9, adult     Peripheral edema     Seasonal allergies     Sleep apnea     CPAP compliant    Wears glasses     reading only     Past Surgical History:   Procedure Laterality Date    APPENDECTOMY OPEN  1976    CATARACT EXTRACTION W/ INTRAOCULAR LENS IMPLANT Right 07/26/2018    Procedure: CATARACT PHACO EXTRACTION WITH INTRAOCULAR LENS IMPLANT RIGHT;  Surgeon: Melquiades Campo MD;  Location: [facility] OR;  Service: Ophthalmology    CATARACT EXTRACTION W/ INTRAOCULAR LENS IMPLANT Left 08/09/2018    Procedure: CATARACT PHACO EXTRACTION WITH INTRAOCULAR LENS IMPLANT LEFT;  Surgeon: Melquiades Campo MD;  Location: [facility] OR;  Service: Ophthalmology    COLONOSCOPY N/A 07/28/2021    Procedure: COLONOSCOPY WITH POLYPECTOMY;  Surgeon: Jimmy Donohue MD;  Location: [facility] ENDOSCOPY;  Service:  "Gastroenterology;  Laterality: N/A;    EAR TUBES      ENDOSCOPY N/A 2024    Procedure: ESOPHAGOGASTRODUODENOSCOPY WITH BIOPSY;  Surgeon: Leonila Reeves MD;  Location: [facility] ENDOSCOPY;  Service: General;  Laterality: N/A;    INCISION AND DRAINAGE ABSCESS      \"INGROWN HAIR\" LEFT PUBIC AREA, required packing, hospitalized w/ IV abx 5 days.    TONSILLECTOMY AND ADENOIDECTOMY         Allergies   Allergen Reactions    Sulfa Antibiotics Nausea And Vomiting       Current Facility-Administered Medications:     ceFAZolin 3000 mg IVPB in 100 mL NS (MBP), 3 g, Intravenous, Once, Leonila Reeves MD    midazolam (VERSED) injection 1 mg, 1 mg, Intravenous, Q10 Min PRN, Alber Lyon MD    sodium chloride 0.9 % infusion, 150 mL/hr, Intravenous, Continuous, Leonila Reeves MD    Social History     Socioeconomic History    Marital status:    Tobacco Use    Smoking status: Former     Current packs/day: 0.00     Average packs/day: 0.5 packs/day for 28.0 years (14.0 ttl pk-yrs)     Types: Cigarettes     Start date: 1980     Quit date: 2008     Years since quittin.3     Passive exposure: Past    Smokeless tobacco: Former     Types: Snuff     Quit date:    Vaping Use    Vaping status: Never Used   Substance and Sexual Activity    Alcohol use: Yes     Comment: occ.    Drug use: No    Sexual activity: Defer     Social History     Social History Narrative    Lives in [place], [place].   with 3 adult children.  Works w/ [employer] - .        Family History   Problem Relation Age of Onset    Hypertension Mother     Heart attack Mother     Heart disease Mother     Heart attack Father     Heart disease Father     COPD Father     Hypertension Maternal Grandmother     Asthma Maternal Grandmother     Hypertension Maternal Grandfather     Lung disease Maternal Grandfather         black lung    Obesity Paternal Grandfather     Hypertension Paternal " Grandfather     Heart attack Paternal Grandfather     Heart disease Paternal Grandfather        Review of Systems    Physical Exam:  Vital Signs:  Weight: (!) 156 kg (343 lb)   Body mass index is 53.72 kg/m².  Temp: 97.9 °F (36.6 °C)   Heart Rate: 93   BP: 173/94     Physical Exam  Vitals reviewed.   Constitutional:       Appearance: He is well-developed.   HENT:      Head: Normocephalic and atraumatic.      Nose: Nose normal.   Eyes:      Conjunctiva/sclera: Conjunctivae normal.      Pupils: Pupils are equal, round, and reactive to light.   Neck:      Thyroid: No thyromegaly.      Vascular: No carotid bruit.      Trachea: No tracheal deviation.   Cardiovascular:      Rate and Rhythm: Normal rate and regular rhythm.      Heart sounds: Normal heart sounds.   Pulmonary:      Effort: Pulmonary effort is normal. No respiratory distress.      Breath sounds: Normal breath sounds.   Abdominal:      General: There is no distension.      Palpations: Abdomen is soft.      Tenderness: There is no abdominal tenderness.      Comments: Reducible umbilical hernia, RLQ incision   Musculoskeletal:         General: No deformity. Normal range of motion.      Cervical back: Normal range of motion and neck supple.   Skin:     General: Skin is warm and dry.      Findings: No rash.   Neurological:      Mental Status: He is alert and oriented to person, place, and time.      Cranial Nerves: No cranial nerve deficit.      Coordination: Coordination normal.      Comments: Ambulates with cane   Psychiatric:         Behavior: Behavior normal.         Thought Content: Thought content normal.         Judgment: Judgment normal.         Problem List Items Addressed This Visit       Morbid obesity with BMI of 50.0-59.9, adult    Relevant Medications    sodium chloride 0.9 % bolus 1,000 mL (Completed)    sodium chloride 0.9 % infusion    ceFAZolin 3000 mg IVPB in 100 mL NS (MBP)    chlorhexidine (PERIDEX) 0.12 % solution 15 mL (Completed)    *  (Principal) Hiatal hernia    Relevant Medications    famotidine (PEPCID) tablet 20 mg (Completed)    sodium chloride 0.9 % bolus 1,000 mL (Completed)    sodium chloride 0.9 % infusion    ceFAZolin 3000 mg IVPB in 100 mL NS (MBP)    chlorhexidine (PERIDEX) 0.12 % solution 15 mL (Completed)       Assessment:    Sandra Garza is a 58 y.o. year old male with medically complicated obesity.    Weight loss surgery is deemed medically necessary given the following obesity related comorbidities including Oa, HLD,HTN, EVA, DM2   with current Weight: (!) 156 kg (343 lb) and Body mass index is 53.72 kg/m²..    Patient is aware that surgery is a tool, and that weight loss is not guaranteed but only seen in the context of appropriate use, follow up and exercise.    The patient was present for an approximately a 2.5 hour discussion of the purpose of weight loss surgery, how WLS is a tool to assist in achieving weight loss goals, the most common complications and how best to avoid them, and the strategies for short and long term weight loss.  Ample opportunity to discuss questions was available both in group and during the time of individual examination.    I reviewed all available preop labs, Xrays, tests, clearances, etc and signed off on these in the record.  All of this in addition to the patient's unique history and exam has been taken into consideration in determining their appropriate candidacy for weight loss surgery.    Complications  of laparoscopic/possible robotic gastric sleeve were discussed. The patient is well aware of the potential complications of surgery that include but not limited to bleeding, infections, deep venous thrombosis, pulmonary embolism, pulmonary complications such as pneumonia, cardiac events, hernias, small bowel obstruction, damage to the spleen or other organs, bowel injury, disfiguring scars, failure to lose weight, need for additional surgery, conversion to an open procedure, and death.  "Patient is also aware of complications which apply in this particular procedure that can include but are not limited to a \"leak\" at the staple line which in some instances may require conversion to gastric bypass.    The patient is aware if a hiatal hernia is encountered, it likely will be repaired.  R/B/A Rx to hiatal hernia repair were discussed as outlined in our long consent form.  Briefly risks in addition to those for LSG include recurrent hernia, FILIPPO, dysphagia, esophageal injury, pneumothorax, injury to the vagus nerves, injury to the thoracic duct, aorta or vena cava.    Greater than 3 minutes was spent with the patient discussing avoiding all tobacco products and second hand smoke at least 2 weeks pre-operatively and 6 weeks post-operatively to minimize the risk of sleeve leak.  This included discussing the importance of avoiding even secondhand smoke as the risk of leak is increased.  Examples discussed:  I made it very clear that the patient understands they should avoid even riding in a car where someone has previously smoked in the last 2 weeks, living in a house where someone smokes (even if it's in a separate room/patio/attached garage, etc.) we discussed that they should not have a conversation with a group of people who are smoking even if it's outside.  They can be around wood burning fires and barbecue.  I told them I do not know if marijuana has a same effects but my overall recommendation is to avoid it for 2 weeks prior in 6 weeks after surgery.  They also are aware that nicotine may also increase the risk of leak and I strongly encouraged him to avoid that as well for 2 weeks prior in 6 weeks after surgery.    Discussed the risks, benefits and alternative therapies at great length as outlined in our extensive consent forms, consent videos, and educational teaching process under the direction of the center's .    A copy of the patient's signed informed consent is on " file.    Plan:  Laparoscopic robotic assisted sleeve gastrectomy + HHR at [facility]      R/B/A Rx discussed to postop anticoagulation including but not limited to bleeding, drug reaction, venothromboembolic events, etc. and he is agreeable to taking 3 weeks of Lovenox.    MDM high:  Elective procedure with the following risk factors: morbid obesity, EVA, DM2, HTN  4+ chronic medical problems reviewed.    I spent 60 minutes caring for Sandra on this date of service. This time includes time spent by me in the following activities: preparing for the visit, reviewing tests, performing a medically appropriate examination and/or evaluation, counseling and educating the patient/family/caregiver, referring and communicating with other health care professionals, documenting information in the medical record, independently interpreting results and communicating that information with the patient/family/caregiver, care coordination, ordering medications, ordering test(s), ordering procedure(s), obtaining a separately obtained history, and reviewing a separately obtained history.       Thank you Choco Mcmahon MD for allowing me to share in the care of our mutual patient.             Leonila Reeves MD       11/19/24    The patient has been seen and examined by me today. There are no changes to the H&P.

## 2024-11-20 ENCOUNTER — APPOINTMENT (OUTPATIENT)
Dept: GENERAL RADIOLOGY | Facility: HOSPITAL | Age: 59
End: 2024-11-20
Payer: COMMERCIAL

## 2024-11-20 VITALS
HEIGHT: 68 IN | SYSTOLIC BLOOD PRESSURE: 146 MMHG | TEMPERATURE: 98.2 F | HEART RATE: 87 BPM | WEIGHT: 315 LBS | BODY MASS INDEX: 47.74 KG/M2 | DIASTOLIC BLOOD PRESSURE: 83 MMHG | OXYGEN SATURATION: 95 % | RESPIRATION RATE: 17 BRPM

## 2024-11-20 PROBLEM — K44.9 HIATAL HERNIA: Status: RESOLVED | Noted: 2024-10-14 | Resolved: 2024-11-20

## 2024-11-20 LAB
ALBUMIN SERPL-MCNC: 4.3 G/DL (ref 3.5–5.2)
ALBUMIN/GLOB SERPL: 1.5 G/DL
ALP SERPL-CCNC: 71 U/L (ref 39–117)
ALT SERPL W P-5'-P-CCNC: 66 U/L (ref 1–41)
ANION GAP SERPL CALCULATED.3IONS-SCNC: 14 MMOL/L (ref 5–15)
AST SERPL-CCNC: 55 U/L (ref 1–40)
BASOPHILS # BLD AUTO: 0.04 10*3/MM3 (ref 0–0.2)
BASOPHILS NFR BLD AUTO: 0.3 % (ref 0–1.5)
BILIRUB SERPL-MCNC: 0.5 MG/DL (ref 0–1.2)
BUN SERPL-MCNC: 10 MG/DL (ref 6–20)
BUN/CREAT SERPL: 12.2 (ref 7–25)
CALCIUM SPEC-SCNC: 9.3 MG/DL (ref 8.6–10.5)
CHLORIDE SERPL-SCNC: 99 MMOL/L (ref 98–107)
CO2 SERPL-SCNC: 24 MMOL/L (ref 22–29)
CREAT SERPL-MCNC: 0.82 MG/DL (ref 0.76–1.27)
DEPRECATED RDW RBC AUTO: 44.6 FL (ref 37–54)
EGFRCR SERPLBLD CKD-EPI 2021: 102.5 ML/MIN/1.73
EOSINOPHIL # BLD AUTO: 0 10*3/MM3 (ref 0–0.4)
EOSINOPHIL NFR BLD AUTO: 0 % (ref 0.3–6.2)
ERYTHROCYTE [DISTWIDTH] IN BLOOD BY AUTOMATED COUNT: 13.2 % (ref 12.3–15.4)
GLOBULIN UR ELPH-MCNC: 2.8 GM/DL
GLUCOSE BLDC GLUCOMTR-MCNC: 106 MG/DL (ref 70–130)
GLUCOSE BLDC GLUCOMTR-MCNC: 109 MG/DL (ref 70–130)
GLUCOSE SERPL-MCNC: 127 MG/DL (ref 65–99)
HCT VFR BLD AUTO: 42.4 % (ref 37.5–51)
HGB BLD-MCNC: 13.8 G/DL (ref 13–17.7)
IMM GRANULOCYTES # BLD AUTO: 0.06 10*3/MM3 (ref 0–0.05)
IMM GRANULOCYTES NFR BLD AUTO: 0.4 % (ref 0–0.5)
IRON 24H UR-MRATE: 40 MCG/DL (ref 59–158)
LYMPHOCYTES # BLD AUTO: 1.34 10*3/MM3 (ref 0.7–3.1)
LYMPHOCYTES NFR BLD AUTO: 8.5 % (ref 19.6–45.3)
MCH RBC QN AUTO: 30 PG (ref 26.6–33)
MCHC RBC AUTO-ENTMCNC: 32.5 G/DL (ref 31.5–35.7)
MCV RBC AUTO: 92.2 FL (ref 79–97)
MONOCYTES # BLD AUTO: 0.83 10*3/MM3 (ref 0.1–0.9)
MONOCYTES NFR BLD AUTO: 5.3 % (ref 5–12)
NEUTROPHILS NFR BLD AUTO: 13.48 10*3/MM3 (ref 1.7–7)
NEUTROPHILS NFR BLD AUTO: 85.5 % (ref 42.7–76)
NRBC BLD AUTO-RTO: 0 /100 WBC (ref 0–0.2)
PLATELET # BLD AUTO: 326 10*3/MM3 (ref 140–450)
PMV BLD AUTO: 10.7 FL (ref 6–12)
POTASSIUM SERPL-SCNC: 4.5 MMOL/L (ref 3.5–5.2)
PROT SERPL-MCNC: 7.1 G/DL (ref 6–8.5)
RBC # BLD AUTO: 4.6 10*6/MM3 (ref 4.14–5.8)
SODIUM SERPL-SCNC: 137 MMOL/L (ref 136–145)
WBC NRBC COR # BLD AUTO: 15.75 10*3/MM3 (ref 3.4–10.8)

## 2024-11-20 PROCEDURE — 25010000002 CEFAZOLIN 3 G RECONSTITUTED SOLUTION 1 EACH VIAL: Performed by: SURGERY

## 2024-11-20 PROCEDURE — 74240 X-RAY XM UPR GI TRC 1CNTRST: CPT

## 2024-11-20 PROCEDURE — 25010000002 ENOXAPARIN PER 10 MG

## 2024-11-20 PROCEDURE — 80053 COMPREHEN METABOLIC PANEL: CPT | Performed by: SURGERY

## 2024-11-20 PROCEDURE — 83540 ASSAY OF IRON: CPT | Performed by: SURGERY

## 2024-11-20 PROCEDURE — 25510000002 DIATRIZOATE MEGLUMINE & SODIUM PER 1 ML: Performed by: SURGERY

## 2024-11-20 PROCEDURE — 82948 REAGENT STRIP/BLOOD GLUCOSE: CPT

## 2024-11-20 PROCEDURE — 25810000003 SODIUM CHLORIDE 0.9 % SOLUTION 1,000 ML FLEX CONT: Performed by: SURGERY

## 2024-11-20 PROCEDURE — 25010000002 POTASSIUM CHLORIDE PER 2 MEQ: Performed by: SURGERY

## 2024-11-20 PROCEDURE — 85025 COMPLETE CBC W/AUTO DIFF WBC: CPT | Performed by: SURGERY

## 2024-11-20 PROCEDURE — 25010000002 THIAMINE PER 100 MG: Performed by: SURGERY

## 2024-11-20 PROCEDURE — 99024 POSTOP FOLLOW-UP VISIT: CPT | Performed by: SURGERY

## 2024-11-20 PROCEDURE — 25010000002 CYANOCOBALAMIN PER 1000 MCG: Performed by: SURGERY

## 2024-11-20 PROCEDURE — 94799 UNLISTED PULMONARY SVC/PX: CPT

## 2024-11-20 PROCEDURE — 25010000002 NA FERRIC GLUC CPLX PER 12.5 MG: Performed by: SURGERY

## 2024-11-20 RX ORDER — OXYCODONE HYDROCHLORIDE 5 MG/1
5 TABLET ORAL EVERY 4 HOURS PRN
Qty: 10 TABLET | Refills: 0 | Status: SHIPPED | OUTPATIENT
Start: 2024-11-20

## 2024-11-20 RX ORDER — DIATRIZOATE MEGLUMINE AND DIATRIZOATE SODIUM 660; 100 MG/ML; MG/ML
30 SOLUTION ORAL; RECTAL
Status: COMPLETED | OUTPATIENT
Start: 2024-11-20 | End: 2024-11-20

## 2024-11-20 RX ORDER — ONDANSETRON 4 MG/1
4 TABLET, ORALLY DISINTEGRATING ORAL EVERY 4 HOURS PRN
Qty: 10 TABLET | Refills: 0 | Status: SHIPPED | OUTPATIENT
Start: 2024-11-20

## 2024-11-20 RX ADMIN — SIMETHICONE 80 MG: 80 TABLET, CHEWABLE ORAL at 03:37

## 2024-11-20 RX ADMIN — SODIUM CHLORIDE 125 MG: 9 INJECTION, SOLUTION INTRAVENOUS at 14:55

## 2024-11-20 RX ADMIN — GABAPENTIN 100 MG: 100 CAPSULE ORAL at 09:38

## 2024-11-20 RX ADMIN — FLUTICASONE PROPIONATE 2 SPRAY: 50 SPRAY, METERED NASAL at 09:38

## 2024-11-20 RX ADMIN — HYDROCODONE BITARTRATE AND ACETAMINOPHEN 1 TABLET: 5; 325 TABLET ORAL at 06:10

## 2024-11-20 RX ADMIN — FINASTERIDE 5 MG: 5 TABLET, FILM COATED ORAL at 09:38

## 2024-11-20 RX ADMIN — SODIUM CHLORIDE 3000 MG: 900 INJECTION INTRAVENOUS at 06:06

## 2024-11-20 RX ADMIN — POTASSIUM CHLORIDE AND SODIUM CHLORIDE 100 ML/HR: 450; 150 INJECTION, SOLUTION INTRAVENOUS at 09:39

## 2024-11-20 RX ADMIN — FOLIC ACID 200 ML/HR: 5 INJECTION, SOLUTION INTRAMUSCULAR; INTRAVENOUS; SUBCUTANEOUS at 03:37

## 2024-11-20 RX ADMIN — LOSARTAN POTASSIUM 100 MG: 50 TABLET, FILM COATED ORAL at 09:38

## 2024-11-20 RX ADMIN — ATORVASTATIN CALCIUM 20 MG: 20 TABLET, FILM COATED ORAL at 09:38

## 2024-11-20 RX ADMIN — TAMSULOSIN HYDROCHLORIDE 0.4 MG: 0.4 CAPSULE ORAL at 09:38

## 2024-11-20 RX ADMIN — ACETAMINOPHEN 1000 MG: 500 TABLET ORAL at 14:34

## 2024-11-20 RX ADMIN — LEVOTHYROXINE SODIUM 137 MCG: 137 TABLET ORAL at 06:06

## 2024-11-20 RX ADMIN — CYANOCOBALAMIN 1000 MCG: 1000 INJECTION, SOLUTION INTRAMUSCULAR at 09:38

## 2024-11-20 RX ADMIN — Medication 10 ML: at 09:49

## 2024-11-20 RX ADMIN — DIATRIZOATE MEGLUMINE AND DIATRIZOATE SODIUM 30 ML: 660; 100 SOLUTION ORAL; RECTAL at 10:31

## 2024-11-20 RX ADMIN — ENOXAPARIN SODIUM 60 MG: 60 INJECTION SUBCUTANEOUS at 09:38

## 2024-11-20 RX ADMIN — ACETAMINOPHEN 1000 MG: 500 TABLET ORAL at 06:06

## 2024-11-20 NOTE — CASE MANAGEMENT/SOCIAL WORK
Discharge Planning Assessment  [facility]     Patient Name: Sandra Garza  MRN: [id number]  Today's Date: 11/20/2024    Admit Date: 11/19/2024    Plan: home   Discharge Needs Assessment       Row Name 11/20/24 0842       Living Environment    People in Home spouse    Primary Care Provided by self       Transition Planning    Patient/Family Anticipates Transition to home with family       Discharge Needs Assessment    Readmission Within the Last 30 Days no previous admission in last 30 days    Equipment Currently Used at Home cpap;walker, rolling;cane, straight    Concerns to be Addressed basic needs;discharge planning                   Discharge Plan       Row Name 11/20/24 0843       Plan    Plan home    Patient/Family in Agreement with Plan yes    Plan Comments I met with this patient bedside. He lives with his wife in [facility]. He is independent with activities of daily living and mobility with the use of a rolling walker and straight cane. He does use a cpap at home. He anticipates returning home after this hospitalization, and his wife can transport. Case management will follow.    Final Discharge Disposition Code 01 - home or self-care                  Continued Care and Services - Admitted Since 11/19/2024    No active coordination exists for this encounter.          Demographic Summary       Row Name 11/20/24 0842       General Information    General Information Comments I confirmed that Choco Mcmahon is Mr Garza's PCP and he has [facility]                   Functional Status       Row Name 11/20/24 0842       Functional Status, IADL    Medications independent    Meal Preparation independent    Housekeeping independent    Laundry independent    Shopping independent                   Psychosocial    No documentation.                  Abuse/Neglect    No documentation.                  Legal    No documentation.                  Substance Abuse    No documentation.                  Patient  Forms    No documentation.                     Marium Murphy RN

## 2024-11-20 NOTE — CASE MANAGEMENT/SOCIAL WORK
Continued Stay Note  [facility]     Patient Name: Sandra Garza  MRN: [id number]  Today's Date: 11/20/2024    Admit Date: 11/19/2024    Plan: rehab   Discharge Plan       Row Name 11/20/24 0846       Plan    Plan rehab    Patient/Family in Agreement with Plan yes    Plan Comments I met with this patient bedside. Her plan is rehab and she asked that a referral be made to [facility]. CM called yesterday and left a . CM to follow up with the referral today. Her friend or son can transport. CM to follow.    Final Discharge Disposition Code 03 - skilled nursing facility (SNF)      Row Name 11/20/24 0843       Plan    Plan home    Patient/Family in Agreement with Plan yes    Plan Comments I met with this patient bedside. He lives with his wife in [facility]. He is independent with activities of daily living and mobility with the use of a rolling walker and straight cane. He does use a cpap at home. He anticipates returning home after this hospitalization, and his wife can transport. Case management will follow.    Final Discharge Disposition Code 01 - home or self-care                   Discharge Codes    No documentation.                       Marium Murphy RN

## 2024-11-20 NOTE — PROGRESS NOTES
"Cc: POD#1 RSG/HHR  \"feel pretty good\"    He is sitting up at the side of the bed.  His wife is in the room.  He feels well and would like to go home if possible.  Tolerating liquids without nausea or vomiting.  Ambulating and voiding well.  No pulmonary complaints, spirometer over 2000 observed.  Passing flatus, no bowel movement.    No fever or tachycardia pulse 77 respirations 18 blood pressure 142/80 saturation 92%.  He is no apparent distress.  Abdomen soft, appropriately tender, minimally distended, bowel sounds active.  Wounds look okay.    Iron is 40.  White count 15.8 with 86 segs 9 lymphs 5 monocytes no bands H&H 13.8 and 42.4.  CMP normal for glucose of 127 ALT 66 AST 55.  Upper GI images reviewed prior to discharge no leak or obstruction appreciated, report unremarkable.    Impression: Postop day #1 robotic sleeve gastrectomy and hiatal hernia repair.  Doing well clinically, would like to go home and does meet discharge criteria.  Iron deficiency without evidence of anemia or bleeding on Lovenox.  Diabetes with hemoglobin A1c 6.10.  Mild leukocytosis with mild left shift without evidence of infection.  Elevated transaminases likely due to hepatomegaly and liver retraction during surgery.    Plan: Discharge home.  Discharge instructions discussed.  Patient says he has his Lovenox at home.  Follow-up in the office in 1 to 2 weeks and call in the meantime with any problems questions or concerns.  Reiterated avoiding ulcerogenic agents for the next 6 to 8 weeks.  Prescriptions for oxycodone for breakthrough pain in addition to his home hydrocodone, Zofran in case.  Continue home PPI.  See orders.   "

## 2024-11-20 NOTE — CASE MANAGEMENT/SOCIAL WORK
Continued Stay Note  [facility]     Patient Name: Sandra Garza  MRN: [id number]  Today's Date: 11/20/2024    Admit Date: 11/19/2024    Plan: home   Discharge Plan       Row Name 11/20/24 1452       Plan    Plan home    Patient/Family in Agreement with Plan yes    Plan Comments I spoke with this patient regarding his discharge home today. He is in agreement, and his wife can transport. He denies having any further discharge planning needs.    Final Discharge Disposition Code 01 - home or self-care                   Discharge Codes    No documentation.                 Expected Discharge Date and Time       Expected Discharge Date Expected Discharge Time    Nov 20, 2024               Marium Murphy RN

## 2024-11-20 NOTE — PLAN OF CARE
Goal Outcome Evaluation:           Progress: improving  Outcome Evaluation: VSS. Pain and nausea controlled with PRNs. Voided and ambulated in hallway. Fluids infusing. ABX infused. Will continue plan of care.

## 2024-11-21 NOTE — PAYOR COMM NOTE
"Ramona Alvarado RN  [facility]  Utilization Management  P:[phone]  F:[phone]    Auth # [id number]   DC date 11/20/24  No dc summary available    Wesley Pardo \"Aly\" (58 y.o. Male)       Date of Birth   1965    Social Security Number       Address   [address] [address]    Home Phone   [phone]    MRN   [id number]       [facility]    Marital Status                               Admission Date   11/19/24    Admission Type   Elective    Admitting Provider   Leonila Reeves MD    Attending Provider       Department, Room/Bed   [facility] 2F, S210/1       Discharge Date   11/20/2024    Discharge Disposition   Home or Self Care    Discharge Destination                                 Attending Provider: (none)   Allergies: Sulfa Antibiotics    Isolation: None   Infection: None   Code Status: Prior    Ht: 172.7 cm (68\")   Wt: 152 kg (335 lb 1.6 oz)    Admission Cmt: None   Principal Problem: Hiatal hernia [K44.9]                   Active Insurance as of 11/19/2024       Primary Coverage       Payor Plan Insurance Group Employer/Plan Group    [facility] [employer] [id number]       Payor Plan Address Payor Plan Phone Number Payor Plan Fax Number Effective Dates    [address] [phone]  1/1/2015 - None Entered    [place] [address]         Subscriber Name Subscriber Birth Date Member ID       WESLEY PARDO 1965 [id number]                     Emergency Contacts        (Rel.) Home Phone Work Phone Mobile Phone    REYCHARITY (Spouse) -- -- [phone]    GregJinny hoffmann (Mother) [phone] -- --    HALEIGHIAN DENNIS (Daughter) [phone] -- --                 History & Physical        Leonila Reeves MD at 11/19/24 1312          [facility] BARIATRIC SURGERY  [address] [id number] [phone]      Patient  " "Name:  Sandra Garza  :  1965      Date of Visit: 2024      Chief Complaint:  weight gain; unable to maintain weight loss    History of Present Illness:  Sandra Garza is a 58 y.o. male who presents today for evaluation, education and consultation regarding weight loss surgery.     Patient has been overweight for many years, with numerous failed dietary/weight loss attempts.  He now has obesity related comorbidities of Oa, HLD,HTN, EVA, DM2  and as such has decided to pursue weight loss surgery.    From intake:  \"  Intake TSH abnL - meds adjusted.      GES 24 @[facility] - unremarkable.       Cardiac Clearance obtained 24.     Pulmonary Eval 24 - now using CPAP faithfully.  \"    2024 Update:    Reports I operated on his supervisor, Joceline.    Started PPI since I rx after EGD for erosive esophagitis.  Reporst it has really helped his stomach after taking his NSAID    He takes Norco 5 bid tab baseline for chronic pain.    The patient lives in [place], and drives school bus, , bridges.    No personal or family hx of VTE or clotting d/o.  No liver, lung, heart, or renal disease    Review of data:     PORTILLO: monthly Norco  HP neg    CBC          2024    13:11 10/9/2024    09:07 2024    08:44   CBC   WBC 11.6  11.92  9.75    RBC 5.03  4.60  4.66    Hemoglobin 14.9  13.9  14.1    Hematocrit 45.5  42.4  41.9    MCV 91  92.2  89.9    MCH 29.6  30.2  30.3    MCHC 32.7  32.8  33.7    RDW 13.3  13.0  13.5    Platelets 286  332  301      CMP          2024    07:25 10/9/2024    09:07 2024    08:44   CMP   Glucose  119     BUN  13     Creatinine 1.17  0.92     EGFR 72.7  97.0     Sodium  139     Potassium 4.0  3.9  4.3    Chloride  102     Calcium  9.9     Total Protein  7.8     Albumin  4.4     Globulin  3.4     Total Bilirubin  0.5     Alkaline Phosphatase  68     AST (SGOT)  30     ALT (SGPT)  35     Albumin/Globulin Ratio  1.3     BUN/Creatinine Ratio "  14.1     Anion Gap  11.0            EGD: GEJ 41 cm, + HH.  [place] colored mucosa.  Erosive esophagitis  Path- no Puga's    GES: normal         Cardiac clearance:low risk    Pulm clearance: moderate risk        Last tobacco: 20 years  Last NSAIDs: Voltaren this morning.  Last ASA: none receny  Last steroids: 9/2024 for rash  Last hormones: n/a         Past Medical History:   Diagnosis Date    Arthritis     (R) hip, on Diclofenac + Norco    Aspirin long-term use     for routine prevention    BPH (benign prostatic hyperplasia)     Cataract, bilateral     Chronic bronchitis     prn inhaler/steroid use, does not follow w/ pulmonary    Diabetes mellitus     Dyspepsia     Dyspnea on exertion     Fatigue     Former tobacco use     quit dip 2023, quit smoking 2012    Heartburn     chronic/episodic, prn OTC PPIs, denies prior eval    History of being tatooed     Hyperlipidemia     Hypertension     Hypothyroidism     Impaired mobility     d/t chronic hip pain, ambulates w/ cane    Kidney stone     Morbid obesity with BMI of 50.0-59.9, adult     Peripheral edema     Seasonal allergies     Sleep apnea     CPAP compliant    Wears glasses     reading only     Past Surgical History:   Procedure Laterality Date    APPENDECTOMY OPEN  1976    CATARACT EXTRACTION W/ INTRAOCULAR LENS IMPLANT Right 07/26/2018    Procedure: CATARACT PHACO EXTRACTION WITH INTRAOCULAR LENS IMPLANT RIGHT;  Surgeon: Melquiades Campo MD;  Location: [facility] OR;  Service: Ophthalmology    CATARACT EXTRACTION W/ INTRAOCULAR LENS IMPLANT Left 08/09/2018    Procedure: CATARACT PHACO EXTRACTION WITH INTRAOCULAR LENS IMPLANT LEFT;  Surgeon: Melquiades Campo MD;  Location: [facility] OR;  Service: Ophthalmology    COLONOSCOPY N/A 07/28/2021    Procedure: COLONOSCOPY WITH POLYPECTOMY;  Surgeon: Jimmy Donohue MD;  Location: [facility] ENDOSCOPY;  Service: Gastroenterology;  Laterality: N/A;    EAR TUBES  1978    ENDOSCOPY N/A 8/19/2024    Procedure: ESOPHAGOGASTRODUODENOSCOPY WITH  "BIOPSY;  Surgeon: Leonila Reeves MD;  Location: [facility] ENDOSCOPY;  Service: General;  Laterality: N/A;    INCISION AND DRAINAGE ABSCESS      \"INGROWN HAIR\" LEFT PUBIC AREA, required packing, hospitalized w/ IV abx 5 days.    TONSILLECTOMY AND ADENOIDECTOMY         Allergies   Allergen Reactions    Sulfa Antibiotics Nausea And Vomiting       Current Facility-Administered Medications:     ceFAZolin 3000 mg IVPB in 100 mL NS (MBP), 3 g, Intravenous, Once, Leonila Reeves MD    midazolam (VERSED) injection 1 mg, 1 mg, Intravenous, Q10 Min PRN, Alber Lyon MD    sodium chloride 0.9 % infusion, 150 mL/hr, Intravenous, Continuous, Leonila Reeves MD    Social History     Socioeconomic History    Marital status:    Tobacco Use    Smoking status: Former     Current packs/day: 0.00     Average packs/day: 0.5 packs/day for 28.0 years (14.0 ttl pk-yrs)     Types: Cigarettes     Start date: 1980     Quit date: 2008     Years since quittin.3     Passive exposure: Past    Smokeless tobacco: Former     Types: Snuff     Quit date:    Vaping Use    Vaping status: Never Used   Substance and Sexual Activity    Alcohol use: Yes     Comment: occ.    Drug use: No    Sexual activity: Defer     Social History     Social History Narrative    Lives in [place], [place].   with 3 adult children.  Works / [employer] .        Family History   Problem Relation Age of Onset    Hypertension Mother     Heart attack Mother     Heart disease Mother     Heart attack Father     Heart disease Father     COPD Father     Hypertension Maternal Grandmother     Asthma Maternal Grandmother     Hypertension Maternal Grandfather     Lung disease Maternal Grandfather         black lung    Obesity Paternal Grandfather     Hypertension Paternal Grandfather     Heart attack Paternal Grandfather     Heart disease Paternal Grandfather        Review of Systems    Physical " Exam:  Vital Signs:  Weight: (!) 156 kg (343 lb)   Body mass index is 53.72 kg/m².  Temp: 97.9 °F (36.6 °C)   Heart Rate: 93   BP: 173/94     Physical Exam  Vitals reviewed.   Constitutional:       Appearance: He is well-developed.   HENT:      Head: Normocephalic and atraumatic.      Nose: Nose normal.   Eyes:      Conjunctiva/sclera: Conjunctivae normal.      Pupils: Pupils are equal, round, and reactive to light.   Neck:      Thyroid: No thyromegaly.      Vascular: No carotid bruit.      Trachea: No tracheal deviation.   Cardiovascular:      Rate and Rhythm: Normal rate and regular rhythm.      Heart sounds: Normal heart sounds.   Pulmonary:      Effort: Pulmonary effort is normal. No respiratory distress.      Breath sounds: Normal breath sounds.   Abdominal:      General: There is no distension.      Palpations: Abdomen is soft.      Tenderness: There is no abdominal tenderness.      Comments: Reducible umbilical hernia, RLQ incision   Musculoskeletal:         General: No deformity. Normal range of motion.      Cervical back: Normal range of motion and neck supple.   Skin:     General: Skin is warm and dry.      Findings: No rash.   Neurological:      Mental Status: He is alert and oriented to person, place, and time.      Cranial Nerves: No cranial nerve deficit.      Coordination: Coordination normal.      Comments: Ambulates with cane   Psychiatric:         Behavior: Behavior normal.         Thought Content: Thought content normal.         Judgment: Judgment normal.         Problem List Items Addressed This Visit       Morbid obesity with BMI of 50.0-59.9, adult    Relevant Medications    sodium chloride 0.9 % bolus 1,000 mL (Completed)    sodium chloride 0.9 % infusion    ceFAZolin 3000 mg IVPB in 100 mL NS (MBP)    chlorhexidine (PERIDEX) 0.12 % solution 15 mL (Completed)    * (Principal) Hiatal hernia    Relevant Medications    famotidine (PEPCID) tablet 20 mg (Completed)    sodium chloride 0.9 % bolus  "1,000 mL (Completed)    sodium chloride 0.9 % infusion    ceFAZolin 3000 mg IVPB in 100 mL NS (MBP)    chlorhexidine (PERIDEX) 0.12 % solution 15 mL (Completed)       Assessment:    Sandra Garza is a 58 y.o. year old male with medically complicated obesity.    Weight loss surgery is deemed medically necessary given the following obesity related comorbidities including Oa, HLD,HTN, EVA, DM2   with current Weight: (!) 156 kg (343 lb) and Body mass index is 53.72 kg/m²..    Patient is aware that surgery is a tool, and that weight loss is not guaranteed but only seen in the context of appropriate use, follow up and exercise.    The patient was present for an approximately a 2.5 hour discussion of the purpose of weight loss surgery, how WLS is a tool to assist in achieving weight loss goals, the most common complications and how best to avoid them, and the strategies for short and long term weight loss.  Ample opportunity to discuss questions was available both in group and during the time of individual examination.    I reviewed all available preop labs, Xrays, tests, clearances, etc and signed off on these in the record.  All of this in addition to the patient's unique history and exam has been taken into consideration in determining their appropriate candidacy for weight loss surgery.    Complications  of laparoscopic/possible robotic gastric sleeve were discussed. The patient is well aware of the potential complications of surgery that include but not limited to bleeding, infections, deep venous thrombosis, pulmonary embolism, pulmonary complications such as pneumonia, cardiac events, hernias, small bowel obstruction, damage to the spleen or other organs, bowel injury, disfiguring scars, failure to lose weight, need for additional surgery, conversion to an open procedure, and death. Patient is also aware of complications which apply in this particular procedure that can include but are not limited to a \"leak\" " at the staple line which in some instances may require conversion to gastric bypass.    The patient is aware if a hiatal hernia is encountered, it likely will be repaired.  R/B/A Rx to hiatal hernia repair were discussed as outlined in our long consent form.  Briefly risks in addition to those for LSG include recurrent hernia, FILIPPO, dysphagia, esophageal injury, pneumothorax, injury to the vagus nerves, injury to the thoracic duct, aorta or vena cava.    Greater than 3 minutes was spent with the patient discussing avoiding all tobacco products and second hand smoke at least 2 weeks pre-operatively and 6 weeks post-operatively to minimize the risk of sleeve leak.  This included discussing the importance of avoiding even secondhand smoke as the risk of leak is increased.  Examples discussed:  I made it very clear that the patient understands they should avoid even riding in a car where someone has previously smoked in the last 2 weeks, living in a house where someone smokes (even if it's in a separate room/patio/attached garage, etc.) we discussed that they should not have a conversation with a group of people who are smoking even if it's outside.  They can be around wood burning fires and barbecue.  I told them I do not know if marijuana has a same effects but my overall recommendation is to avoid it for 2 weeks prior in 6 weeks after surgery.  They also are aware that nicotine may also increase the risk of leak and I strongly encouraged him to avoid that as well for 2 weeks prior in 6 weeks after surgery.    Discussed the risks, benefits and alternative therapies at great length as outlined in our extensive consent forms, consent videos, and educational teaching process under the direction of the center's .    A copy of the patient's signed informed consent is on file.    Plan:  Laparoscopic robotic assisted sleeve gastrectomy + HHR at [facility]      R/B/A Rx discussed to postop anticoagulation including  but not limited to bleeding, drug reaction, venothromboembolic events, etc. and he is agreeable to taking 3 weeks of Lovenox.    MDM high:  Elective procedure with the following risk factors: morbid obesity, EVA, DM2, HTN  4+ chronic medical problems reviewed.    I spent 60 minutes caring for Sandra on this date of service. This time includes time spent by me in the following activities: preparing for the visit, reviewing tests, performing a medically appropriate examination and/or evaluation, counseling and educating the patient/family/caregiver, referring and communicating with other health care professionals, documenting information in the medical record, independently interpreting results and communicating that information with the patient/family/caregiver, care coordination, ordering medications, ordering test(s), ordering procedure(s), obtaining a separately obtained history, and reviewing a separately obtained history.       Thank you Choco Mcmahon MD for allowing me to share in the care of our mutual patient.             Leonila Reeves MD       11/19/24    The patient has been seen and examined by me today. There are no changes to the H&P.                                            Electronically signed by Leonila Reeves MD at 11/19/24 1312       No current facility-administered medications for this encounter.     Current Outpatient Medications   Medication Sig Dispense Refill    acetaminophen (TYLENOL) 650 MG 8 hr tablet Take 1 tablet by mouth Every 8 (Eight) Hours As Needed for Mild Pain.      albuterol sulfate  (90 Base) MCG/ACT inhaler Inhale 2 puffs Every 4 (Four) Hours As Needed for Wheezing. 18 g 5    atorvastatin (LIPITOR) 20 MG tablet Take 1 tablet by mouth Daily.      finasteride (PROSCAR) 5 MG tablet Take 1 tablet by mouth Daily.      fluticasone (FLONASE) 50 MCG/ACT nasal spray Administer 2 sprays into the nostril(s) as directed by provider Daily.      glimepiride  (AMARYL) 1 MG tablet Take 1 tablet by mouth Every Morning Before Breakfast.      HYDROcodone-acetaminophen (NORCO) 5-325 MG per tablet Take 1 tablet by mouth Every 6 (Six) Hours As Needed.      levothyroxine (SYNTHROID, LEVOTHROID) 137 MCG tablet Take 1 tablet by mouth Daily.      losartan (COZAAR) 100 MG tablet Take 1 tablet by mouth Daily.      meclizine (ANTIVERT) 25 MG tablet Take 1 tablet by mouth 3 (Three) Times a Day As Needed.      omeprazole (priLOSEC) 40 MG capsule Take 1 capsule by mouth Daily for 360 days. 90 capsule 3    tamsulosin (FLOMAX) 0.4 MG capsule 24 hr capsule Take 1 capsule by mouth Daily.      cyclobenzaprine (FLEXERIL) 10 MG tablet Take 0.5 tablets by mouth 2 (Two) Times a Day As Needed for Muscle Spasms.      naloxone (NARCAN) 4 MG/0.1ML nasal spray Call 911. Don't prime. [place] in 1 nostril for overdose. Repeat in 2-3 minutes in other nostril if no or minimal breathing/responsiveness. 2 each 0    ondansetron ODT (ZOFRAN-ODT) 4 MG disintegrating tablet Take 1 tablet by mouth Every 4 (Four) Hours As Needed for Nausea or Vomiting. 10 tablet 0    oxyCODONE (Roxicodone) 5 MG immediate release tablet Take 1 tablet by mouth Every 4 (Four) Hours As Needed for Moderate Pain. 10 tablet 0     Lab Results (all)       Procedure Component Value Units Date/Time    Tissue Pathology Exam [[id number]] Collected: 11/19/24 1457    Specimen: Tissue from Liver, Tissue from Stomach Updated: 11/21/24 1136     Case Report --     Surgical Pathology Report                         Case: [id number]                                  Authorizing Provider:  Leonila Reeves MD  Collected:           11/19/2024 02:57 PM          Ordering Location:     [facility]   Received:            11/20/2024 07:01 AM                                 OR                                                                           Pathologist:           Neeraj Miller MD                                                        "   Specimens:   1) - Liver, LIVER BIOPSY                                                                            2) - Stomach, SUB-TOTAL GASTRECTOMY                                                         Clinical Information --     Morbid obesity with BMI of 50.0-59.9, adult  Hiatal hernia         Final Diagnosis --     Liver, core biopsy:  Mild steatosis  No significant inflammation or fibrosis identified  No iron identified  2.   Stomach, partial gastrectomy  Minimal chronic gastritis  Negative for H. pylori, metaplasia, dysplasia or malignancy       Gross Description --     1. Liver.  Received in formalin labeled \"liver biopsy\" are 2 tan-brown liver cores measuring 0.4 cm in length and 0.1 cm in diameter and 0.5 cm in length and 0.1 cm in diameter.  The specimen is submitted entirely in 1A-1B, largest core in 1A.    2. Stomach.  Received in formalin labeled subtotal gastrectomy is a 27.1 x 4.7 x 3.0 cm intact, unopened portion of stomach with a staple line running the length of the specimen.  The serosa is tan-pink with minimal attached fat.  No significant surgical disruption is present.  The lumen contains a moderate amount of dark red viscous blood and the mucosa is red-pink and congested with normal, prominent rugal folds and minimal mucosal sloughing.  No polyps, ulcers or areas of mucosal thickening are present.  Representative sections are submitted in blocks 2A-2C. [place]       Microscopic Description --     Two hepatic core fragments show mild steatosis without significant inflammation or fibrosis.  1 portal track contains lymphocytes without piecemeal necrosis or interface activity.  No fibrosis is identified on the trichrome stain.  No iron is identified on the Prussian blue stain.  The controls are adequate.    Reactive gastric mucosa shows minimal chronic inflammation with scattered lymphoid aggregates.  No metaplasia, dysplasia or malignancy is identified      POC Glucose Once [[id number]]  (Normal) " Collected: 11/20/24 1119    Specimen: Blood Updated: 11/20/24 1121     Glucose 106 mg/dL     POC Glucose Once [[id number]]  (Normal) Collected: 11/20/24 0655    Specimen: Blood Updated: 11/20/24 0657     Glucose 109 mg/dL     Iron [[id number]]  (Abnormal) Collected: 11/20/24 0352    Specimen: Blood Updated: 11/20/24 0508     Iron 40 mcg/dL     Comprehensive Metabolic Panel [[id number]]  (Abnormal) Collected: 11/20/24 0352    Specimen: Blood Updated: 11/20/24 0508     Glucose 127 mg/dL      BUN 10 mg/dL      Creatinine 0.82 mg/dL      Sodium 137 mmol/L      Potassium 4.5 mmol/L      Chloride 99 mmol/L      CO2 24.0 mmol/L      Calcium 9.3 mg/dL      Total Protein 7.1 g/dL      Albumin 4.3 g/dL      ALT (SGPT) 66 U/L      AST (SGOT) 55 U/L      Alkaline Phosphatase 71 U/L      Total Bilirubin 0.5 mg/dL      Globulin 2.8 gm/dL      Comment: Calculated Result        A/G Ratio 1.5 g/dL      BUN/Creatinine Ratio 12.2     Anion Gap 14.0 mmol/L      eGFR 102.5 mL/min/1.73     Narrative:      GFR Normal >60  Chronic Kidney Disease <60  Kidney Failure <15      CBC & Differential [[id number]]  (Abnormal) Collected: 11/20/24 0352    Specimen: Blood Updated: 11/20/24 0453    Narrative:      The following orders were created for panel order CBC & Differential.  Procedure                               Abnormality         Status                     ---------                               -----------         ------                     CBC Auto Differential[[id number]]        Abnormal            Final result                 Please view results for these tests on the individual orders.    CBC Auto Differential [[id number]]  (Abnormal) Collected: 11/20/24 0352    Specimen: Blood Updated: 11/20/24 0453     WBC 15.75 10*3/mm3      RBC 4.60 10*6/mm3      Hemoglobin 13.8 g/dL      Hematocrit 42.4 %      MCV 92.2 fL      MCH 30.0 pg      MCHC 32.5 g/dL      RDW 13.2 %      RDW-SD 44.6 fl      MPV 10.7 fL      Platelets 326 10*3/mm3       Neutrophil % 85.5 %      Lymphocyte % 8.5 %      Monocyte % 5.3 %      Eosinophil % 0.0 %      Basophil % 0.3 %      Immature Grans % 0.4 %      Neutrophils, Absolute 13.48 10*3/mm3      Lymphocytes, Absolute 1.34 10*3/mm3      Monocytes, Absolute 0.83 10*3/mm3      Eosinophils, Absolute 0.00 10*3/mm3      Basophils, Absolute 0.04 10*3/mm3      Immature Grans, Absolute 0.06 10*3/mm3      nRBC 0.0 /100 WBC     POC Glucose Once [[id number]]  (Normal) Collected: 11/19/24 2220    Specimen: Blood Updated: 11/19/24 2221     Glucose 130 mg/dL     Comprehensive Metabolic Panel [[id number]]  (Abnormal) Collected: 11/19/24 1902    Specimen: Blood Updated: 11/19/24 1930     Glucose 160 mg/dL      BUN 11 mg/dL      Creatinine 0.78 mg/dL      Sodium 137 mmol/L      Potassium 3.9 mmol/L      Chloride 103 mmol/L      CO2 20.0 mmol/L      Calcium 8.8 mg/dL      Total Protein 7.4 g/dL      Albumin 4.1 g/dL      ALT (SGPT) 70 U/L      AST (SGOT) 64 U/L      Alkaline Phosphatase 70 U/L      Total Bilirubin 0.4 mg/dL      Globulin 3.3 gm/dL      Comment: Calculated Result        A/G Ratio 1.2 g/dL      BUN/Creatinine Ratio 14.1     Anion Gap 14.0 mmol/L      eGFR 104.0 mL/min/1.73     Narrative:      GFR Normal >60  Chronic Kidney Disease <60  Kidney Failure <15      POC Glucose Once [[id number]]  (Abnormal) Collected: 11/19/24 1849    Specimen: Blood Updated: 11/19/24 1852     Glucose 145 mg/dL     POC Glucose Once [[id number]]  (Normal) Collected: 11/19/24 1230    Specimen: Blood Updated: 11/19/24 1234     Glucose 104 mg/dL           Imaging Results (All)       Procedure Component Value Units Date/Time    FL Upper GI Water Soluble [[id number]] Collected: 11/20/24 1037     Updated: 11/20/24 1630    Narrative:      FL UPPER GI WATER SOLUBLE    Date of Exam: 11/20/2024 10:16 AM EST    Indication: post sleeve, rule out leak.       Comparison: None available.    Technique:   radiograph of the abdomen was obtained. Under fluoroscopic  observation, the patient ingested water-soluble contrast. Fluoroscopic imaging was obtained through the upper gastrointestinal tract.    Fluoroscopic Time: 18 seconds    Number of Images: 8 associated fluoroscopic series were saved    Findings:   imaging reveals a gasless abdomen. There is a trace amount of postoperative free air visible under the right hemidiaphragm. The oral phase of deglutition appeared normal. The esophageal mucosa appeared grossly normal. There was no evidence of a   focal esophageal stricture. Examination of the stomach demonstrated postoperative changes that are consistent with a vertical sleeve gastrectomy. No extravasation of contrast was seen. The gastric folds, gastric mucosa appeared grossly normal. There was   no delay in gastric emptying. The duodenal bulb, and duodenal C-loop appeared grossly normal.      Impression:      Impression:  Status post vertical sleeve gastrectomy. There was no evidence of extraluminal contrast. There was no delay in gastric emptying.      Report dictated by: Jenny Ralph PA-c      I have personally reviewed this case and agree with the findings above:    Electronically Signed: Judd Patel MD    11/20/2024 4:27 PM EST    Workstation ID: [id number]             Operative/Procedure Notes (all)        Leonila Reeves MD at 11/19/24 1433  Version 1 of 1         OPERATIVE REPORT    DATE: 11/19/24    PATIENT: Sandra Garza    PREOPERATIVE DIAGNOSIS:    1. Morbid obesity with comorbidities  2.  Hiatal hernia    POSTOPERATIVE DIAGNOSIS:    1. Morbid obesity with multiple comorbidities.  2.  Hiatal hernia  3.  Hepatomegaly with gross steatosis and fibrosis      PROCEDURES PERFORMED:  1. Robotic assisted laparoscopic sleeve gastrectomy (85% subtotal vertical gastrectomy) and type I sliding  hiatal hernia repair  2.  Laparoscopic Charan-Cut liver biopsy    SURGEON:  Leonila Reeves M.D.    ASSISTANT: JORGE Duenas was instrumental in the success  of the case and provided retraction, suction, camera holding, and skin closure.    ANESTHESIA:  General endotracheal with TAP block    ESTIMATED BLOOD LOSS:   25 mL    FLUIDS:  Crystalloids.    SPECIMENS:  Subtotal gastrectomy, Charan-Cut liver biopsy    DRAINS:  None.    COUNTS:  Correct.    COMPLICATIONS:  None.    FINDINGS: Hepatomegaly with gross steatosis and fibrosis.  Small hiatal hernia.  Abundant visceral fat that rendered the case more challenging than usual.  Umbilical hernia containing adhesed omentum, not disturbed.    INDICATIONS:   Sandra Garza is a 58 y.o. year old male with morbid obesity and associated comorbidities who presents for elective laparoscopic sleeve gastrectomy. The patient has undergone our extensive preoperative education, teaching, and consent process. Everything is in order and they wish to proceed.         DESCRIPTION OF PROCEDURE:   The patient was brought to the operating room and placed supine upon the operating room table. SCDs were placed. The patient underwent uneventful general endotracheal anesthesia and bilateral TAP blocks per the anesthesiology staff.  He received subcutaneous Lovenox and was given Ancef. The abdomen was prepped with ChloraPrep and draped in the usual sterile fashion. An Ioban was used as well.  Timeout was performed.     A small transverse incision was made a few centimeters above and to the left of the umbilicus and the peritoneal cavity entered under direct camera visualization using  a 10 mm 0° laparoscope and an OptiView trocar.  The abdomen was then insufflated to a pressure of 16 mmHg with CO2 gas. Exploratory laparoscopy revealed no evidence of injury from the entrance technique.  There was impressive hepatomegaly with gross steatosis and fibrosis.  There was abundant visceral fat that made visualization of other organs difficult.  There was an umbilical hernia containing adhesed omentum that was not disturbed.  Two 8 mm ports were placed to  the patient's left, lateral of the first port, and a 12 mm port was placed to the patient's right.  The robot was docked, all safety checks were performed, and I moved to the robot console.     A 2-0 Stratafix suture was placed to make a liver sling with bites of the peritoneum and the right noah of the diaphragm, and the left lobe of the liver was elevated but the stitch immediately broke under the weight of the large liver.  I rescrubbed, and placed a Shreyas liver retractor through a small subxiphoid incision and retracted the left lobe of the liver.      A Visigi bougie was inserted and used to decompress the stomach. The greater curvature vessels were taken down with the vessel sealer energy device beginning at the midpoint of the stomach and continued up to the angle of His, including the short gastrics. The left noah was completely exposed and there was a hiatal hernia seen posteriorly. This was photodocumented. The GE junction fat pad was extremely larger was elevated then amputated and removed to make a clear landing zone for the stapler later. The greater curvature vessels were taken down with the energy device extending distally within 3 cm of the pylorus. Filmy adhesions to the stomach were taken down posteriorly.     I dissected the left noah with the vessel sealer, then divided the pars flaccida.  There was no replaced hepatic vessel in the gastrohepatic ligament.  I dissected the right noah, then passed a Penrose drain around the esophagus for retraction.  The phrenoesophageal ligament was divided.  The posterior vagus trunk was obvious and was carefully preserved.  Once the hiatus was completely dissected, I performed a posterior cruroplasty with running nonabsorbable 2-0 V-Loc suture.  The Penrose was removed.     The 36 Ivorian Visigi bougie was positioned along the lesser curvature of the stomach.  The stomach was marked at 1 cm from the angle of His, 3 cm from the incisura, and 5 cm from the  pylorus using an ink saturated Kittner.  1 mg of IV glucagon was given to relax gastric smooth muscle.  Using the bougie as a template, a vertical sleeve gastrectomy was fashioned with successive staple loads: the first load was blue, and the following 5 loads were white.   The staple line was examined and was hemostatic. The gastrectomy specimen, which was larger than usual, was removed through the 12 mm port site. The specimen was later examined, and the staples were well-formed. Palpation of the specimen revealed no masses. The staple line of the sleeve gastrectomy revealed staples that were well-formed. The upper abdomen was flooded with saline, and a leak was performed by insufflating the stomach through the bougie. The leak test was normal.  The insufflated stomach was observed to be lying nicely without twist or stricture, and was appropriately sized.              I rescrubbed and suctioned the irrigation fluid from the upper abdomen, making sure it was dry. The staple line on the stomach was hemostatic.  The staple line was treated with 4 mL of aerosolized Tisseel fibrin glue.     An 18 gauge Charan-Cut needle was inserted through the subxiphoid incision.  A core needle biopsy was taken from the left lobe of the liver.  Hemostasis was obtained immediately with cautery.  The specimen was examined and was adequate.  The liver retractor was removed easily. The 12 mm port was removed under direct visualization and there was no bleeding. This incision was closed with 0-Vicryl transfascial suture using an M Close device. All other ports were removed and then replaced under direct visualization and all of these were hemostatic. The instruments were removed and the abdomen was desufflated. The ports were removed.  3-0 Monocryl plus was used to close skin incisions with interrupted sutures. Skin glue was placed. 10 mg of IV Barhemsys was given at the end of the case.  The patient was awakened and taken to recovery in  "good condition, having tolerated the procedure well. All sponge, needle, and instrument counts were correct.             Electronically signed by Leonila Reeves MD at 11/19/24 1637          Physician Progress Notes (all)        Chester Pham MD at 11/20/24 1443          Cc: POD#1 RSG/HHR  \"feel pretty good\"    He is sitting up at the side of the bed.  His wife is in the room.  He feels well and would like to go home if possible.  Tolerating liquids without nausea or vomiting.  Ambulating and voiding well.  No pulmonary complaints, spirometer over 2000 observed.  Passing flatus, no bowel movement.    No fever or tachycardia pulse 77 respirations 18 blood pressure 142/80 saturation 92%.  He is no apparent distress.  Abdomen soft, appropriately tender, minimally distended, bowel sounds active.  Wounds look okay.    Iron is 40.  White count 15.8 with 86 segs 9 lymphs 5 monocytes no bands H&H 13.8 and 42.4.  CMP normal for glucose of 127 ALT 66 AST 55.  Upper GI images reviewed prior to discharge no leak or obstruction appreciated, report unremarkable.    Impression: Postop day #1 robotic sleeve gastrectomy and hiatal hernia repair.  Doing well clinically, would like to go home and does meet discharge criteria.  Iron deficiency without evidence of anemia or bleeding on Lovenox.  Diabetes with hemoglobin A1c 6.10.  Mild leukocytosis with mild left shift without evidence of infection.  Elevated transaminases likely due to hepatomegaly and liver retraction during surgery.    Plan: Discharge home.  Discharge instructions discussed.  Patient says he has his Lovenox at home.  Follow-up in the office in 1 to 2 weeks and call in the meantime with any problems questions or concerns.  Reiterated avoiding ulcerogenic agents for the next 6 to 8 weeks.  Prescriptions for oxycodone for breakthrough pain in addition to his home hydrocodone, Zofran in case.  Continue home PPI.  See orders.     Electronically signed by " Chester Pham MD at 11/20/24 3442       Discharge Summary    No notes of this type exist for this encounter.

## 2024-11-26 ENCOUNTER — OFFICE VISIT (OUTPATIENT)
Dept: BARIATRICS/WEIGHT MGMT | Facility: CLINIC | Age: 59
End: 2024-11-26
Payer: COMMERCIAL

## 2024-11-26 VITALS
DIASTOLIC BLOOD PRESSURE: 84 MMHG | BODY MASS INDEX: 49.25 KG/M2 | OXYGEN SATURATION: 97 % | HEIGHT: 67 IN | TEMPERATURE: 97.1 F | WEIGHT: 313.8 LBS | HEART RATE: 65 BPM | SYSTOLIC BLOOD PRESSURE: 126 MMHG | RESPIRATION RATE: 18 BRPM

## 2024-11-26 DIAGNOSIS — Z98.84 S/P LAPAROSCOPIC SLEEVE GASTRECTOMY: Primary | ICD-10-CM

## 2024-11-26 PROCEDURE — 99024 POSTOP FOLLOW-UP VISIT: CPT | Performed by: SURGERY

## 2024-11-26 RX ORDER — URSODIOL 300 MG/1
300 CAPSULE ORAL 2 TIMES DAILY
Qty: 60 CAPSULE | Refills: 5 | Status: SHIPPED | OUTPATIENT
Start: 2024-11-26

## 2024-11-26 RX ORDER — ENOXAPARIN SODIUM 100 MG/ML
40 INJECTION SUBCUTANEOUS
COMMUNITY

## 2024-11-26 NOTE — LETTER
2024     Choco Mcmahon MD  [address] [facility] [place] [address]    Patient: Sandra Garza   YOB: 1965   Date of Visit: 2024     Dear Choco Mcmahon MD:       Thank you for referring Sandar Garza to me for evaluation. Below are the relevant portions of my assessment and plan of care.    If you have questions, please do not hesitate to call me. I look forward to following Sandra along with you.         Sincerely,        Chester Pham MD        CC: No Recipients    Chseter Pham MD  24 0945  Sign when Signing Visit  [facility]  [address]    [facility] [address] [phone]      Patient Name:  Sandra Garza.  :  1965      Date of Visit: 2024      Reason for Visit:  POD #7    HPI:  Sandra Garza is a 58 y.o. male s/p laparoscopic robotic assisted sleeve gastrectomy and type I sliding hiatal hernia repair and liver biopsy for gross steatosis and fibrosis (pathology minimal steatosis negative fibrosis) Dr. Reeves 2024    He comes in today with his wife.  Overall he is doing very well.  He says he had significant reflux prior to surgery until starting on a PPI which completely controlled his symptoms.  He checks no to all the boxes.  He has obtained his vitamins except he is having trouble finding B1.  He is having his issues with his arthritis and wonders if there is anything he can take besides Tylenol and I told him only that and narcoti, no NSAIDs.  He has a couple narcotic pills left and does not want a refill.  He has been driving.  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  He is taking his Lovenox injections every morning.  He is avoiding ulcerogenic agents.  His protein intake is slowly improving he is up to 75 to 90 g/day.  He says he is exercising 4 days a week.  For quality of life after weight loss surgery he marks very  satisfied.  He is drinking and voiding well.  Med rec reviewed.  He continues on daily PPI.  He had some minor bloody oozing from one of his wounds which has since resolved.     Constipation?:  No       Presurgery weight: 335 pounds.  Today's weight is (!) 142 kg (313 lb 12.8 oz) pounds, today's  Body mass index is 49.15 kg/m²., and weight loss since surgery is 22 pounds.       Path reviewed with patient:  Stomach minimal chronic gastritis negative for H. pylori, liver minimal steatosis, negative fibrosis or iron    Past Medical History:   Diagnosis Date   • Arthritis     (R) hip, on Diclofenac + Norco   • Aspirin long-term use     for routine prevention   • BPH (benign prostatic hyperplasia)    • Cataract, bilateral    • Chronic bronchitis     prn inhaler/steroid use, does not follow w/ pulmonary   • Diabetes mellitus    • Dyspepsia    • Dyspnea on exertion    • Fatigue    • Former tobacco use     quit dip 2023, quit smoking 2012   • Heartburn     chronic/episodic, prn OTC PPIs, denies prior eval   • History of being tatooed    • Hyperlipidemia    • Hypertension    • Hypothyroidism    • Impaired mobility     d/t chronic hip pain, ambulates w/ cane   • Kidney stone    • Morbid obesity with BMI of 50.0-59.9, adult    • Peripheral edema    • Seasonal allergies    • Sleep apnea     CPAP compliant   • Wears glasses     reading only     Past Surgical History:   Procedure Laterality Date   • APPENDECTOMY OPEN  1976   • CATARACT EXTRACTION W/ INTRAOCULAR LENS IMPLANT Right 07/26/2018    Procedure: CATARACT PHACO EXTRACTION WITH INTRAOCULAR LENS IMPLANT RIGHT;  Surgeon: Melquiades Campo MD;  Location: [facility] OR;  Service: Ophthalmology   • CATARACT EXTRACTION W/ INTRAOCULAR LENS IMPLANT Left 08/09/2018    Procedure: CATARACT PHACO EXTRACTION WITH INTRAOCULAR LENS IMPLANT LEFT;  Surgeon: Melquiades Campo MD;  Location: [facility] OR;  Service: Ophthalmology   • COLONOSCOPY N/A 07/28/2021    Procedure: COLONOSCOPY WITH POLYPECTOMY;   "Surgeon: Jimmy Donohue MD;  Location:  [facility];  Service: Gastroenterology;  Laterality: N/A;   • EAR TUBES  1978   • ENDOSCOPY N/A 8/19/2024    Procedure: ESOPHAGOGASTRODUODENOSCOPY WITH BIOPSY;  Surgeon: Leonila Reeves MD;  Location:  [facility];  Service: General;  Laterality: N/A;   • ENDOSCOPY N/A 11/19/2024    Procedure: ESOPHAGOGASTRODUODENOSCOPY;  Surgeon: Leonila Reeves MD;  Location:  [facility];  Service: Bariatric;  Laterality: N/A;   • GASTRIC SLEEVE LAPAROSCOPIC N/A 11/19/2024    Procedure: GASTRIC SLEEVE WITH HIATAL HERNIA LAPAROSCOPIC WITH DAVINCI ROBOT;  Surgeon: Leonila Reeves MD;  Location:  [facility] OR;  Service: Robotics - DaVinci;  Laterality: N/A;   • INCISION AND DRAINAGE ABSCESS  2008    \"INGROWN HAIR\" LEFT PUBIC AREA, required packing, hospitalized w/ IV abx 5 days.   • LIVER BIOPSY N/A 11/19/2024    Procedure: LIVER BIOPSY LAPAROSCOPIC;  Surgeon: Leonila Reeves MD;  Location:  [facility];  Service: Robotics - DaVinci;  Laterality: N/A;   • TONSILLECTOMY AND ADENOIDECTOMY  1972     Outpatient Medications Marked as Taking for the 11/26/24 encounter (Office Visit) with Chester Pham MD   Medication Sig Dispense Refill   • acetaminophen (TYLENOL) 650 MG 8 hr tablet Take 1 tablet by mouth Every 8 (Eight) Hours As Needed for Mild Pain.     • albuterol sulfate  (90 Base) MCG/ACT inhaler Inhale 2 puffs Every 4 (Four) Hours As Needed for Wheezing. 18 g 5   • atorvastatin (LIPITOR) 20 MG tablet Take 1 tablet by mouth Daily.     • finasteride (PROSCAR) 5 MG tablet Take 1 tablet by mouth Daily.     • fluticasone (FLONASE) 50 MCG/ACT nasal spray Administer 2 sprays into the nostril(s) as directed by provider Daily.     • levothyroxine (SYNTHROID, LEVOTHROID) 137 MCG tablet Take 1 tablet by mouth Daily.     • losartan (COZAAR) 100 MG tablet Take 1 tablet by mouth Daily.     • omeprazole (priLOSEC) 40 MG capsule Take 1 capsule by mouth Daily for 360 " "days.  capsule 3   • tamsulosin (FLOMAX) 0.4 MG capsule 24 hr capsule Take 1 capsule by mouth Daily.       Allergies   Allergen Reactions   • Sulfa Antibiotics Nausea And Vomiting       Social History     Socioeconomic History   • Marital status:    Tobacco Use   • Smoking status: Former     Current packs/day: 0.00     Average packs/day: 0.5 packs/day for 28.0 years (14.0 ttl pk-yrs)     Types: Cigarettes     Start date: 1980     Quit date: 2008     Years since quittin.3     Passive exposure: Past   • Smokeless tobacco: Former     Types: Snuff     Quit date:    Vaping Use   • Vaping status: Never Used   Substance and Sexual Activity   • Alcohol use: Yes     Comment: occ.   • Drug use: No   • Sexual activity: Defer       /84 (BP Location: Left arm, Patient Position: Sitting)   Pulse 65   Temp 97.1 °F (36.2 °C)   Resp 18   Ht 170.2 cm (67\")   Wt (!) 142 kg (313 lb 12.8 oz)   SpO2 97%   BMI 49.15 kg/m²   Physical Exam  He is pleasant and conversant in no apparent distress.  Ambulating with a cane.  Looks well-nourished and well-hydrated.  Abdomen soft and benign, incisions healing nicely with some scabbing, no infection/cellulitis.    Assessment:   POD # 7 s/p laparoscopic robotic assisted sleeve gastrectomy and type I sliding hiatal hernia repair and liver biopsy (pathology minimal steatosis negative fibrosis) Dr. Reeves 2024    Doing very well clinically.      Plan:  Continue to advance diet per manual.  Increase protein intake to 100g/day.  Actigall Rx given.  Increase exercise/activity as tolerated.  Reviewed lifting restrictions, nothing >25 lbs x 2 more weeks.  Continue vitamins.  Continue PPI.  Continue to avoid ASA/NSAIDs/Steroids x 6 weeks postop.  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  Can place Neosporin on his scabbed wounds as needed if desired, keep covered if further intermittent drainage.  Call w/ " problems/concerns.    The patient was instructed to follow up in 3 weeks, sooner if needed.     Thank you Dr. Mcmahon for the opportunity participate in the care of Mr. Garza.    Chester Pham MD  Answers submitted by the patient for this visit:  Primary Reason for Visit (Submitted on 11/24/2024)  What is the primary reason for your visit?: Problem Not Listed  Problem not listed (Submitted on 11/24/2024)  Chief Complaint: Other medical problem  Reason for appointment: After surgery check  abdominal pain: No  anorexia: No  joint pain: No  change in stool: Yes  chest pain: No  chills: No  nasal congestion: Yes  cough: No  diaphoresis: No  fatigue: No  fever: No  headaches: No  joint swelling: No  myalgias: No  nausea: No  neck pain: No  numbness: No  rash: No  sore throat: No  swollen glands: No  dysuria: No  vertigo: No  visual change: No  vomiting: No  weakness: No  Onset: in the past 7 days

## 2024-11-26 NOTE — PROGRESS NOTES
[facility] Bariatric Surgery  [address]    Formerly [facility] [id number] [phone]      Patient Name:  Sandra Garza.  :  1965      Date of Visit: 2024      Reason for Visit:  POD #7    HPI:  Sandra Garza is a 58 y.o. male s/p laparoscopic robotic assisted sleeve gastrectomy and type I sliding hiatal hernia repair and liver biopsy for gross steatosis and fibrosis (pathology minimal steatosis negative fibrosis) Dr. Reeves 2024    He comes in today with his wife.  Overall he is doing very well.  He says he had significant reflux prior to surgery until starting on a PPI which completely controlled his symptoms.  He checks no to all the boxes.  He has obtained his vitamins except he is having trouble finding B1.  He is having his issues with his arthritis and wonders if there is anything he can take besides Tylenol and I told him only that and narcoti, no NSAIDs.  He has a couple narcotic pills left and does not want a refill.  He has been driving.  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  He is taking his Lovenox injections every morning.  He is avoiding ulcerogenic agents.  His protein intake is slowly improving he is up to 75 to 90 g/day.  He says he is exercising 4 days a week.  For quality of life after weight loss surgery he marks very satisfied.  He is drinking and voiding well.  Med rec reviewed.  He continues on daily PPI.  He had some minor bloody oozing from one of his wounds which has since resolved.     Constipation?:  No       Presurgery weight: 335 pounds.  Today's weight is (!) 142 kg (313 lb 12.8 oz) pounds, today's  Body mass index is 49.15 kg/m²., and weight loss since surgery is 22 pounds.       Path reviewed with patient:  Stomach minimal chronic gastritis negative for H. pylori, liver minimal steatosis, negative fibrosis or iron    Past Medical History:   Diagnosis Date    Arthritis     (R) hip, on  Diclofenac + Norco    Aspirin long-term use     for routine prevention    BPH (benign prostatic hyperplasia)     Cataract, bilateral     Chronic bronchitis     prn inhaler/steroid use, does not follow w/ pulmonary    Diabetes mellitus     Dyspepsia     Dyspnea on exertion     Fatigue     Former tobacco use     quit dip 2023, quit smoking 2012    Heartburn     chronic/episodic, prn OTC PPIs, denies prior eval    History of being tatooed     Hyperlipidemia     Hypertension     Hypothyroidism     Impaired mobility     d/t chronic hip pain, ambulates w/ cane    Kidney stone     Morbid obesity with BMI of 50.0-59.9, adult     Peripheral edema     Seasonal allergies     Sleep apnea     CPAP compliant    Wears glasses     reading only     Past Surgical History:   Procedure Laterality Date    APPENDECTOMY OPEN  1976    CATARACT EXTRACTION W/ INTRAOCULAR LENS IMPLANT Right 07/26/2018    Procedure: CATARACT PHACO EXTRACTION WITH INTRAOCULAR LENS IMPLANT RIGHT;  Surgeon: Melquiades Campo MD;  Location: [facility] OR;  Service: Ophthalmology    CATARACT EXTRACTION W/ INTRAOCULAR LENS IMPLANT Left 08/09/2018    Procedure: CATARACT PHACO EXTRACTION WITH INTRAOCULAR LENS IMPLANT LEFT;  Surgeon: Melquiades Campo MD;  Location: [facility] OR;  Service: Ophthalmology    COLONOSCOPY N/A 07/28/2021    Procedure: COLONOSCOPY WITH POLYPECTOMY;  Surgeon: Jimmy Donohue MD;  Location:  [facility];  Service: Gastroenterology;  Laterality: N/A;    EAR TUBES  1978    ENDOSCOPY N/A 8/19/2024    Procedure: ESOPHAGOGASTRODUODENOSCOPY WITH BIOPSY;  Surgeon: Leonila Reeves MD;  Location:  [facility];  Service: General;  Laterality: N/A;    ENDOSCOPY N/A 11/19/2024    Procedure: ESOPHAGOGASTRODUODENOSCOPY;  Surgeon: Leonila Reeves MD;  Location:  [facility] OR;  Service: Bariatric;  Laterality: N/A;    GASTRIC SLEEVE LAPAROSCOPIC N/A 11/19/2024    Procedure: GASTRIC SLEEVE WITH HIATAL HERNIA LAPAROSCOPIC WITH DAVINCI ROBOT;  Surgeon:  "Leonila Reeves MD;  Location:  [facility] OR;  Service: Robotics - DaVinci;  Laterality: N/A;    INCISION AND DRAINAGE ABSCESS      \"INGROWN HAIR\" LEFT PUBIC AREA, required packing, hospitalized w/ IV abx 5 days.    LIVER BIOPSY N/A 2024    Procedure: LIVER BIOPSY LAPAROSCOPIC;  Surgeon: Leonila Reeves MD;  Location:  [facility] OR;  Service: Robotics - DaVinci;  Laterality: N/A;    TONSILLECTOMY AND ADENOIDECTOMY  1972     Outpatient Medications Marked as Taking for the 24 encounter (Office Visit) with Chester Pham MD   Medication Sig Dispense Refill    acetaminophen (TYLENOL) 650 MG 8 hr tablet Take 1 tablet by mouth Every 8 (Eight) Hours As Needed for Mild Pain.      albuterol sulfate  (90 Base) MCG/ACT inhaler Inhale 2 puffs Every 4 (Four) Hours As Needed for Wheezing. 18 g 5    atorvastatin (LIPITOR) 20 MG tablet Take 1 tablet by mouth Daily.      finasteride (PROSCAR) 5 MG tablet Take 1 tablet by mouth Daily.      fluticasone (FLONASE) 50 MCG/ACT nasal spray Administer 2 sprays into the nostril(s) as directed by provider Daily.      levothyroxine (SYNTHROID, LEVOTHROID) 137 MCG tablet Take 1 tablet by mouth Daily.      losartan (COZAAR) 100 MG tablet Take 1 tablet by mouth Daily.      omeprazole (priLOSEC) 40 MG capsule Take 1 capsule by mouth Daily for 360 days. 90 capsule 3    tamsulosin (FLOMAX) 0.4 MG capsule 24 hr capsule Take 1 capsule by mouth Daily.       Allergies   Allergen Reactions    Sulfa Antibiotics Nausea And Vomiting       Social History     Socioeconomic History    Marital status:    Tobacco Use    Smoking status: Former     Current packs/day: 0.00     Average packs/day: 0.5 packs/day for 28.0 years (14.0 ttl pk-yrs)     Types: Cigarettes     Start date: 1980     Quit date: 2008     Years since quittin.3     Passive exposure: Past    Smokeless tobacco: Former     Types: Snuff     Quit date:    Vaping Use    Vaping status: Never " "Used   Substance and Sexual Activity    Alcohol use: Yes     Comment: occ.    Drug use: No    Sexual activity: Defer       /84 (BP Location: Left arm, Patient Position: Sitting)   Pulse 65   Temp 97.1 °F (36.2 °C)   Resp 18   Ht 170.2 cm (67\")   Wt (!) 142 kg (313 lb 12.8 oz)   SpO2 97%   BMI 49.15 kg/m²   Physical Exam  He is pleasant and conversant in no apparent distress.  Ambulating with a cane.  Looks well-nourished and well-hydrated.  Abdomen soft and benign, incisions healing nicely with some scabbing, no infection/cellulitis.    Assessment:   POD # 7 s/p laparoscopic robotic assisted sleeve gastrectomy and type I sliding hiatal hernia repair and liver biopsy (pathology minimal steatosis negative fibrosis) Dr. Reeves 11/19/2024    Doing very well clinically.      Plan:  Continue to advance diet per manual.  Increase protein intake to 100g/day.  Actigall Rx given.  Increase exercise/activity as tolerated.  Reviewed lifting restrictions, nothing >25 lbs x 2 more weeks.  Continue vitamins.  Continue PPI.  Continue to avoid ASA/NSAIDs/Steroids x 6 weeks postop.  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  Can place Neosporin on his scabbed wounds as needed if desired, keep covered if further intermittent drainage.  Call w/ problems/concerns.    The patient was instructed to follow up in 3 weeks, sooner if needed.     Thank you Dr. Mcmahon for the opportunity participate in the care of Mr. Garza.    Chester Pham MD  Answers submitted by the patient for this visit:  Primary Reason for Visit (Submitted on 11/24/2024)  What is the primary reason for your visit?: Problem Not Listed  Problem not listed (Submitted on 11/24/2024)  Chief Complaint: Other medical problem  Reason for appointment: After surgery check  abdominal pain: No  anorexia: No  joint pain: No  change in stool: Yes  chest pain: No  chills: No  nasal congestion: Yes  cough: No  diaphoresis: " No  fatigue: No  fever: No  headaches: No  joint swelling: No  myalgias: No  nausea: No  neck pain: No  numbness: No  rash: No  sore throat: No  swollen glands: No  dysuria: No  vertigo: No  visual change: No  vomiting: No  weakness: No  Onset: in the past 7 days

## 2024-11-27 ENCOUNTER — PRIOR AUTHORIZATION (OUTPATIENT)
Dept: BARIATRICS/WEIGHT MGMT | Facility: CLINIC | Age: 59
End: 2024-11-27
Payer: COMMERCIAL

## 2024-11-27 NOTE — TELEPHONE ENCOUNTER
PA approved for Omeprazole 40mg.     Outcome  Approved today by [facility] 2017  Your PA request has been approved. Additional information will be provided in the approval communication. (Message 1140)  Authorization Expiration Date: 11/27/2027

## 2024-12-27 ENCOUNTER — OFFICE VISIT (OUTPATIENT)
Dept: BARIATRICS/WEIGHT MGMT | Facility: CLINIC | Age: 59
End: 2024-12-27
Payer: COMMERCIAL

## 2024-12-27 VITALS
HEART RATE: 105 BPM | RESPIRATION RATE: 18 BRPM | DIASTOLIC BLOOD PRESSURE: 80 MMHG | OXYGEN SATURATION: 98 % | HEIGHT: 67 IN | WEIGHT: 286.4 LBS | TEMPERATURE: 98.4 F | SYSTOLIC BLOOD PRESSURE: 124 MMHG | BODY MASS INDEX: 44.95 KG/M2

## 2024-12-27 DIAGNOSIS — K91.2 POSTGASTRECTOMY MALABSORPTION: ICD-10-CM

## 2024-12-27 DIAGNOSIS — R53.83 FATIGUE, UNSPECIFIED TYPE: ICD-10-CM

## 2024-12-27 DIAGNOSIS — R79.0 ABNORMAL BLOOD LEVEL OF IRON: ICD-10-CM

## 2024-12-27 DIAGNOSIS — E55.9 VITAMIN D DEFICIENCY: ICD-10-CM

## 2024-12-27 DIAGNOSIS — E66.01 MORBID OBESITY WITH BMI OF 40.0-44.9, ADULT: Primary | ICD-10-CM

## 2024-12-27 DIAGNOSIS — Z90.3 POSTGASTRECTOMY MALABSORPTION: ICD-10-CM

## 2024-12-27 PROCEDURE — 99024 POSTOP FOLLOW-UP VISIT: CPT | Performed by: NURSE PRACTITIONER

## 2024-12-27 NOTE — PROGRESS NOTES
[facility] Bariatric Surgery  [address]    [facility] [id number] [phone]      Patient Name:  Sandra Garza  :  1965      Date of Visit: 2024      Reason for Visit:  1 month postop    HPI:  Sandra Garza is a 58 y.o. male s/p robotic LSG/HHR/liver bx(minimal steatosis negative fibrosis) 2024 Dr. Reeves    Doing well.  No issues/concerns.  Denies dysphagia, reflux, nausea, vomiting, abdominal pain, diarrhea, and constipation.  Tolerating diet progression - on stage 6.  Getting 75-110g prot/day.  Eating a lot of meat. 2-3 protein shakes. Drinking 64 fluid oz/day. Drinking water, Gatorade zero. Taking MVI, B12, B1, Calcium, Vit D, iron, and Vit C and biotin.  On Omeprazole  and Actigall .  Still holding NSAIDs .  Physical activity: limited d/t hip and knee pain, walks with cane. Possibly getting knee replacement next year.    Off glimepiride, losartan decreased to 50 mg per PCP.      Presurgery weight:  346 pounds. Today's weight is 130 kg (286 lb 6.4 oz) pounds, today's Body mass index is 44.86 kg/m²., and weight loss since surgery is 60 pounds.       Past Medical History:   Diagnosis Date    Arthritis     (R) hip, on Diclofenac + Norco    Aspirin long-term use     for routine prevention    BPH (benign prostatic hyperplasia)     Cataract, bilateral     Chronic bronchitis     prn inhaler/steroid use, does not follow w/ pulmonary    Diabetes mellitus     Dyspepsia     Dyspnea on exertion     Fatigue     Former tobacco use     quit dip , quit smoking     Heartburn     chronic/episodic, prn OTC PPIs, denies prior eval    History of being tatooed     Hyperlipidemia     Hypertension     Hypothyroidism     Impaired mobility     d/t chronic hip pain, ambulates w/ cane    Kidney stone     Morbid obesity with BMI of 50.0-59.9, adult     Peripheral edema     Seasonal allergies     Sleep apnea     CPAP compliant    Wears glasses     reading  "only     Past Surgical History:   Procedure Laterality Date    APPENDECTOMY OPEN  1976    CATARACT EXTRACTION W/ INTRAOCULAR LENS IMPLANT Right 07/26/2018    Procedure: CATARACT PHACO EXTRACTION WITH INTRAOCULAR LENS IMPLANT RIGHT;  Surgeon: Melquiades Campo MD;  Location: [facility] OR;  Service: Ophthalmology    CATARACT EXTRACTION W/ INTRAOCULAR LENS IMPLANT Left 08/09/2018    Procedure: CATARACT PHACO EXTRACTION WITH INTRAOCULAR LENS IMPLANT LEFT;  Surgeon: Melquiades Campo MD;  Location: [facility] OR;  Service: Ophthalmology    COLONOSCOPY N/A 07/28/2021    Procedure: COLONOSCOPY WITH POLYPECTOMY;  Surgeon: Jimmy Donohue MD;  Location: [facility] ENDOSCOPY;  Service: Gastroenterology;  Laterality: N/A;    EAR TUBES  1978    ENDOSCOPY N/A 8/19/2024    Procedure: ESOPHAGOGASTRODUODENOSCOPY WITH BIOPSY;  Surgeon: Leonila Reeves MD;  Location:  [facility];  Service: General;  Laterality: N/A;    ENDOSCOPY N/A 11/19/2024    Procedure: ESOPHAGOGASTRODUODENOSCOPY;  Surgeon: Leonila Reeves MD;  Location:  [facility] OR;  Service: Bariatric;  Laterality: N/A;    GASTRIC SLEEVE LAPAROSCOPIC N/A 11/19/2024    Procedure: GASTRIC SLEEVE WITH HIATAL HERNIA LAPAROSCOPIC WITH DAVINCI ROBOT;  Surgeon: Leonila Reeves MD;  Location:  [facility] OR;  Service: Robotics - DaVinci;  Laterality: N/A;    INCISION AND DRAINAGE ABSCESS  2008    \"INGROWN HAIR\" LEFT PUBIC AREA, required packing, hospitalized w/ IV abx 5 days.    LIVER BIOPSY N/A 11/19/2024    Procedure: LIVER BIOPSY LAPAROSCOPIC;  Surgeon: Leonila Reeves MD;  Location:  [facility] OR;  Service: Robotics - DaVinci;  Laterality: N/A;    TONSILLECTOMY AND ADENOIDECTOMY  1972     Outpatient Medications Marked as Taking for the 12/27/24 encounter (Office Visit) with Arcelia Jesus APRN   Medication Sig Dispense Refill    acetaminophen (TYLENOL) 650 MG 8 hr tablet Take 1 tablet by mouth Every 8 (Eight) Hours As Needed for Mild Pain.      albuterol sulfate  (90 Base) " "MCG/ACT inhaler Inhale 2 puffs Every 4 (Four) Hours As Needed for Wheezing. 18 g 5    atorvastatin (LIPITOR) 20 MG tablet Take 1 tablet by mouth Daily.      fluticasone (FLONASE) 50 MCG/ACT nasal spray Administer 2 sprays into the nostril(s) as directed by provider Daily.      HYDROcodone-acetaminophen (NORCO) 5-325 MG per tablet Take 1 tablet by mouth Every 6 (Six) Hours As Needed.      levothyroxine (SYNTHROID, LEVOTHROID) 137 MCG tablet Take 1 tablet by mouth Daily.      losartan (COZAAR) 100 MG tablet Take 0.5 tablets by mouth Daily.      meclizine (ANTIVERT) 25 MG tablet Take 1 tablet by mouth 3 (Three) Times a Day As Needed.      omeprazole (priLOSEC) 40 MG capsule Take 1 capsule by mouth Daily for 360 days. 90 capsule 3    tamsulosin (FLOMAX) 0.4 MG capsule 24 hr capsule Take 1 capsule by mouth Daily.      ursodiol (Actigall) 300 MG capsule Take 1 capsule by mouth 2 (Two) Times a Day. 60 capsule 5     Allergies   Allergen Reactions    Sulfa Antibiotics Nausea And Vomiting       Social History     Socioeconomic History    Marital status:    Tobacco Use    Smoking status: Former     Current packs/day: 0.00     Average packs/day: 0.5 packs/day for 28.0 years (14.0 ttl pk-yrs)     Types: Cigarettes     Start date: 1980     Quit date: 2008     Years since quittin.4     Passive exposure: Past    Smokeless tobacco: Former     Types: Snuff     Quit date:    Vaping Use    Vaping status: Never Used   Substance and Sexual Activity    Alcohol use: Yes     Comment: occ.    Drug use: No    Sexual activity: Defer     Social History     Social History Narrative    Lives in [place], [place].   with 3 adult children.  Works w/ [employer] - .       /80 (BP Location: Left arm, Patient Position: Sitting)   Pulse 105   Temp 98.4 °F (36.9 °C)   Resp 18   Ht 170.2 cm (67\")   Wt 130 kg (286 lb 6.4 oz)   SpO2 98%   BMI 44.86 kg/m²     Physical Exam  Vitals " reviewed.   Constitutional:       Appearance: He is not ill-appearing.   Eyes:      General: No scleral icterus.  Cardiovascular:      Rate and Rhythm: Normal rate.   Pulmonary:      Effort: Pulmonary effort is normal.   Abdominal:      Palpations: Abdomen is soft.      Tenderness: There is no abdominal tenderness.   Musculoskeletal:         General: Normal range of motion.   Skin:     Findings: No rash.   Neurological:      Mental Status: He is alert.   Psychiatric:         Thought Content: Thought content normal.         Judgment: Judgment normal.           Assessment:  1 month s/p robotic LSG/HHR/liver bx(minimal steatosis negative fibrosis) 11/19/2024 Dr. Reeves      ICD-10-CM ICD-9-CM   1. Morbid obesity with BMI of 40.0-44.9, adult  E66.01 278.01    Z68.41 V85.41   2. Postgastrectomy malabsorption  K91.2 579.3    Z90.3    3. Fatigue, unspecified type  R53.83 780.79   4. Vitamin D deficiency  E55.9 268.9   5. Abnormal blood level of iron  R79.0 790.6              Plan:  Doing well.  Continue to advance diet per manual.  Continue protein 100g/day.  Continue routine physical activity as able.  Routine bariatric labs ordered.  Continue vitamins w/ adjustments pending lab results.  Continue PPI, actigall.  Continue to avoid ASA/NSAIDS/tobacco x 6 weeks postop, steroids x 8 weeks postop.   Call w/ problems/concerns.    The patient was instructed to follow up in 2 months, sooner if needed.     MARIAELENA Cordova

## 2025-01-01 LAB
25(OH)D3+25(OH)D2 SERPL-MCNC: 27.4 NG/ML (ref 30–100)
ALBUMIN SERPL-MCNC: 4.5 G/DL (ref 3.8–4.9)
ALP SERPL-CCNC: 86 IU/L (ref 44–121)
ALT SERPL-CCNC: 21 IU/L (ref 0–44)
AST SERPL-CCNC: 22 IU/L (ref 0–40)
BASOPHILS # BLD AUTO: 0.1 X10E3/UL (ref 0–0.2)
BASOPHILS NFR BLD AUTO: 1 %
BILIRUB SERPL-MCNC: 0.5 MG/DL (ref 0–1.2)
BUN SERPL-MCNC: 14 MG/DL (ref 6–24)
BUN/CREAT SERPL: 18 (ref 9–20)
CALCIUM SERPL-MCNC: 10.1 MG/DL (ref 8.7–10.2)
CHLORIDE SERPL-SCNC: 103 MMOL/L (ref 96–106)
CO2 SERPL-SCNC: 22 MMOL/L (ref 20–29)
CREAT SERPL-MCNC: 0.78 MG/DL (ref 0.76–1.27)
EGFRCR SERPLBLD CKD-EPI 2021: 104 ML/MIN/1.73
EOSINOPHIL # BLD AUTO: 0.4 X10E3/UL (ref 0–0.4)
EOSINOPHIL NFR BLD AUTO: 3 %
ERYTHROCYTE [DISTWIDTH] IN BLOOD BY AUTOMATED COUNT: 13 % (ref 11.6–15.4)
FERRITIN SERPL-MCNC: 685 NG/ML (ref 30–400)
FOLATE SERPL-MCNC: 6.5 NG/ML
GLOBULIN SER CALC-MCNC: 2.8 G/DL (ref 1.5–4.5)
GLUCOSE SERPL-MCNC: 97 MG/DL (ref 70–99)
HCT VFR BLD AUTO: 44.7 % (ref 37.5–51)
HGB BLD-MCNC: 14.7 G/DL (ref 13–17.7)
IMM GRANULOCYTES # BLD AUTO: 0 X10E3/UL (ref 0–0.1)
IMM GRANULOCYTES NFR BLD AUTO: 0 %
IRON SERPL-MCNC: 60 UG/DL (ref 38–169)
LYMPHOCYTES # BLD AUTO: 2.2 X10E3/UL (ref 0.7–3.1)
LYMPHOCYTES NFR BLD AUTO: 18 %
MCH RBC QN AUTO: 30.1 PG (ref 26.6–33)
MCHC RBC AUTO-ENTMCNC: 32.9 G/DL (ref 31.5–35.7)
MCV RBC AUTO: 91 FL (ref 79–97)
METHYLMALONATE SERPL-SCNC: 127 NMOL/L (ref 0–378)
MONOCYTES # BLD AUTO: 0.8 X10E3/UL (ref 0.1–0.9)
MONOCYTES NFR BLD AUTO: 6 %
NEUTROPHILS # BLD AUTO: 8.9 X10E3/UL (ref 1.4–7)
NEUTROPHILS NFR BLD AUTO: 72 %
PLATELET # BLD AUTO: 333 X10E3/UL (ref 150–450)
POTASSIUM SERPL-SCNC: 4.6 MMOL/L (ref 3.5–5.2)
PREALB SERPL-MCNC: 24 MG/DL (ref 10–36)
PROT SERPL-MCNC: 7.3 G/DL (ref 6–8.5)
RBC # BLD AUTO: 4.89 X10E6/UL (ref 4.14–5.8)
SODIUM SERPL-SCNC: 142 MMOL/L (ref 134–144)
VIT B1 BLD-SCNC: 239.7 NMOL/L (ref 66.5–200)
WBC # BLD AUTO: 12.3 X10E3/UL (ref 3.4–10.8)

## 2025-03-19 ENCOUNTER — OFFICE VISIT (OUTPATIENT)
Dept: BARIATRICS/WEIGHT MGMT | Facility: CLINIC | Age: 60
End: 2025-03-19
Payer: COMMERCIAL

## 2025-03-19 VITALS
WEIGHT: 260 LBS | TEMPERATURE: 97.7 F | RESPIRATION RATE: 18 BRPM | OXYGEN SATURATION: 97 % | SYSTOLIC BLOOD PRESSURE: 126 MMHG | DIASTOLIC BLOOD PRESSURE: 82 MMHG | BODY MASS INDEX: 40.81 KG/M2 | HEIGHT: 67 IN | HEART RATE: 67 BPM

## 2025-03-19 DIAGNOSIS — E66.01 MORBID OBESITY WITH BMI OF 40.0-44.9, ADULT: Primary | ICD-10-CM

## 2025-03-19 DIAGNOSIS — R79.0 ABNORMAL BLOOD LEVEL OF IRON: ICD-10-CM

## 2025-03-19 DIAGNOSIS — R53.83 FATIGUE, UNSPECIFIED TYPE: ICD-10-CM

## 2025-03-19 DIAGNOSIS — Z90.3 POSTGASTRECTOMY MALABSORPTION: ICD-10-CM

## 2025-03-19 DIAGNOSIS — K91.2 POSTGASTRECTOMY MALABSORPTION: ICD-10-CM

## 2025-03-19 DIAGNOSIS — E55.9 VITAMIN D DEFICIENCY: ICD-10-CM

## 2025-03-19 PROCEDURE — 99214 OFFICE O/P EST MOD 30 MIN: CPT | Performed by: NURSE PRACTITIONER

## 2025-03-19 NOTE — PROGRESS NOTES
[facility] Bariatric Surgery  [address]    [facility] [id number] [phone]      Patient Name:  Sandra Garza  :  1965      Date of Visit: 2025      Reason for Visit:  4 months postop    HPI:  Sandra Garza is a 59 y.o. male s/p robotic LSG/HHR/liver bx(minimal steatosis negative fibrosis) 2024 Dr. Reeves    Doing well. Feeling great! Moving around easier, breathing easier.      Denies dysphagia, reflux, nausea, vomiting, abdominal pain, diarrhea, and constipation. On Omeprazole  and Actigall .      Getting 75-100g prot/day.  Drinking 1-2 protein shakes per day. Eats 3 meals per day, plus snacks. Drinking 64 fluid oz/day.      Physical activity: walking, active at home. Limited d/t hip and knee pain, walks with cane. Possibly getting knee replacement next year.    Labs 24- low vit D. Given weekly vit D rx- finished. Taking MVI, B12, B1, Calcium, Vit D, iron, and Vit C and biotin.      Presurgery weight:  346 pounds. Today's weight is 118 kg (260 lb) pounds, today's Body mass index is 40.72 kg/m²., and weight loss since surgery is 86 pounds.       Past Medical History:   Diagnosis Date    Arthritis     (R) hip, on Diclofenac + Norco    Aspirin long-term use     for routine prevention    BPH (benign prostatic hyperplasia)     Cataract, bilateral     Chronic bronchitis     prn inhaler/steroid use, does not follow w/ pulmonary    Diabetes mellitus     Dyspepsia     Dyspnea on exertion     Fatigue     Former tobacco use     quit dip , quit smoking     Heartburn     chronic/episodic, prn OTC PPIs, denies prior eval    History of being tatooed     Hyperlipidemia     Hypertension     Hypothyroidism     Impaired mobility     d/t chronic hip pain, ambulates w/ cane    Kidney stone     Morbid obesity with BMI of 50.0-59.9, adult     Peripheral edema     Seasonal allergies     Sleep apnea     CPAP compliant    Wears glasses     reading  "only     Past Surgical History:   Procedure Laterality Date    APPENDECTOMY OPEN  1976    CATARACT EXTRACTION W/ INTRAOCULAR LENS IMPLANT Right 07/26/2018    Procedure: CATARACT PHACO EXTRACTION WITH INTRAOCULAR LENS IMPLANT RIGHT;  Surgeon: Melquiades Campo MD;  Location: [facility] OR;  Service: Ophthalmology    CATARACT EXTRACTION W/ INTRAOCULAR LENS IMPLANT Left 08/09/2018    Procedure: CATARACT PHACO EXTRACTION WITH INTRAOCULAR LENS IMPLANT LEFT;  Surgeon: Melquiades Campo MD;  Location: [facility] OR;  Service: Ophthalmology    COLONOSCOPY N/A 07/28/2021    Procedure: COLONOSCOPY WITH POLYPECTOMY;  Surgeon: Jimmy Donohue MD;  Location: [facility] ENDOSCOPY;  Service: Gastroenterology;  Laterality: N/A;    EAR TUBES  1978    ENDOSCOPY N/A 8/19/2024    Procedure: ESOPHAGOGASTRODUODENOSCOPY WITH BIOPSY;  Surgeon: Leonila Reeves MD;  Location:  [facility];  Service: General;  Laterality: N/A;    ENDOSCOPY N/A 11/19/2024    Procedure: ESOPHAGOGASTRODUODENOSCOPY;  Surgeon: Leonila Reeves MD;  Location:  [facility] OR;  Service: Bariatric;  Laterality: N/A;    GASTRIC SLEEVE LAPAROSCOPIC N/A 11/19/2024    Procedure: GASTRIC SLEEVE WITH HIATAL HERNIA LAPAROSCOPIC WITH DAVINCI ROBOT;  Surgeon: Leonila Reeves MD;  Location:  [facility] OR;  Service: Robotics - DaVinci;  Laterality: N/A;    INCISION AND DRAINAGE ABSCESS  2008    \"INGROWN HAIR\" LEFT PUBIC AREA, required packing, hospitalized w/ IV abx 5 days.    LIVER BIOPSY N/A 11/19/2024    Procedure: LIVER BIOPSY LAPAROSCOPIC;  Surgeon: Leonila Reeves MD;  Location:  [facility];  Service: Robotics - DaVinci;  Laterality: N/A;    TONSILLECTOMY AND ADENOIDECTOMY  1972     Outpatient Medications Marked as Taking for the 3/19/25 encounter (Office Visit) with Arcelia Jesus APRN   Medication Sig Dispense Refill    acetaminophen (TYLENOL) 650 MG 8 hr tablet Take 1 tablet by mouth Every 8 (Eight) Hours As Needed for Mild Pain.      albuterol sulfate  (90 Base) " "MCG/ACT inhaler Inhale 2 puffs Every 4 (Four) Hours As Needed for Wheezing. 18 g 5    atorvastatin (LIPITOR) 20 MG tablet Take 1 tablet by mouth Daily.      fluticasone (FLONASE) 50 MCG/ACT nasal spray Administer 2 sprays into the nostril(s) as directed by provider Daily.      levothyroxine (SYNTHROID, LEVOTHROID) 137 MCG tablet Take 1 tablet by mouth Daily.      losartan (COZAAR) 100 MG tablet Take 0.5 tablets by mouth Daily.      omeprazole (priLOSEC) 40 MG capsule Take 1 capsule by mouth Daily for 360 days. 90 capsule 3    tamsulosin (FLOMAX) 0.4 MG capsule 24 hr capsule Take 1 capsule by mouth Daily.      ursodiol (Actigall) 300 MG capsule Take 1 capsule by mouth 2 (Two) Times a Day. 60 capsule 5     Allergies   Allergen Reactions    Sulfa Antibiotics Nausea And Vomiting       Social History     Socioeconomic History    Marital status:    Tobacco Use    Smoking status: Former     Current packs/day: 0.00     Average packs/day: 0.5 packs/day for 28.0 years (14.0 ttl pk-yrs)     Types: Cigarettes     Start date: 1980     Quit date: 2008     Years since quittin.6     Passive exposure: Past    Smokeless tobacco: Former     Types: Snuff     Quit date:    Vaping Use    Vaping status: Never Used   Substance and Sexual Activity    Alcohol use: Yes     Comment: occ.    Drug use: No    Sexual activity: Defer     Social History     Social History Narrative    Lives in [place], [place].   with 3 adult children.  Works [employer]/ [employer] - .       /82 (BP Location: Left arm, Patient Position: Sitting)   Pulse 67   Temp 97.7 °F (36.5 °C)   Resp 18   Ht 170.2 cm (67\")   Wt 118 kg (260 lb)   SpO2 97%   BMI 40.72 kg/m²     Physical Exam  Vitals reviewed.   Constitutional:       General: He is not in acute distress.  Cardiovascular:      Rate and Rhythm: Normal rate.   Pulmonary:      Effort: Pulmonary effort is normal.   Musculoskeletal:         General: Normal " range of motion.   Neurological:      Mental Status: He is alert.   Psychiatric:         Mood and Affect: Mood normal.         Assessment:  4 month s/p robotic LSG/HHR/liver bx(minimal steatosis negative fibrosis) 11/19/2024 Dr. Reeves      ICD-10-CM ICD-9-CM   1. Morbid obesity with BMI of 40.0-44.9, adult  E66.01 278.01    Z68.41 V85.41   2. Postgastrectomy malabsorption  K91.2 579.3    Z90.3    3. Fatigue, unspecified type  R53.83 780.79   4. Vitamin D deficiency  E55.9 268.9   5. Abnormal blood level of iron  R79.0 790.6       Class 3 Severe Obesity (BMI >=40). Obesity-related health conditions include the following:  see above . Obesity is improving with treatment. BMI is is above average; BMI management plan is completed. We discussed  see plan .       Plan:  Doing well.  Continue protein 100g/day.  Continue routine physical activity as able.  Routine bariatric labs ordered.  Continue vitamins w/ adjustments pending lab results.  Continue PPI, actigall.  Call w/ problems/concerns.    The patient was instructed to follow up in 3 months, sooner if needed.     I spent 30 minutes caring for Sandra on this date of service. This time includes time spent by me in the following activities: preparing for the visit, reviewing tests, obtaining and/or reviewing a separately obtained history, performing a medically appropriate examination and/or evaluation, counseling and educating the patient/family/caregiver, ordering medications, tests, or procedures, and documenting information in the medical record.      Arcelia Jesus, APRN

## 2025-03-24 LAB
25(OH)D3+25(OH)D2 SERPL-MCNC: 58.4 NG/ML (ref 30–100)
ALBUMIN SERPL-MCNC: 4.5 G/DL (ref 3.8–4.9)
ALP SERPL-CCNC: 104 IU/L (ref 44–121)
ALT SERPL-CCNC: 12 IU/L (ref 0–44)
AST SERPL-CCNC: 19 IU/L (ref 0–40)
BASOPHILS # BLD AUTO: 0.1 X10E3/UL (ref 0–0.2)
BASOPHILS NFR BLD AUTO: 1 %
BILIRUB SERPL-MCNC: 0.5 MG/DL (ref 0–1.2)
BUN SERPL-MCNC: 15 MG/DL (ref 6–24)
BUN/CREAT SERPL: 21 (ref 9–20)
CALCIUM SERPL-MCNC: 9.8 MG/DL (ref 8.7–10.2)
CHLORIDE SERPL-SCNC: 102 MMOL/L (ref 96–106)
CO2 SERPL-SCNC: 21 MMOL/L (ref 20–29)
CREAT SERPL-MCNC: 0.72 MG/DL (ref 0.76–1.27)
EGFRCR SERPLBLD CKD-EPI 2021: 106 ML/MIN/1.73
EOSINOPHIL # BLD AUTO: 0.3 X10E3/UL (ref 0–0.4)
EOSINOPHIL NFR BLD AUTO: 3 %
ERYTHROCYTE [DISTWIDTH] IN BLOOD BY AUTOMATED COUNT: 14 % (ref 11.6–15.4)
FERRITIN SERPL-MCNC: 482 NG/ML (ref 30–400)
FOLATE SERPL-MCNC: 5.4 NG/ML
GLOBULIN SER CALC-MCNC: 2.8 G/DL (ref 1.5–4.5)
GLUCOSE SERPL-MCNC: 96 MG/DL (ref 70–99)
HCT VFR BLD AUTO: 46.3 % (ref 37.5–51)
HGB BLD-MCNC: 14.5 G/DL (ref 13–17.7)
IMM GRANULOCYTES # BLD AUTO: 0 X10E3/UL (ref 0–0.1)
IMM GRANULOCYTES NFR BLD AUTO: 0 %
IRON SERPL-MCNC: 69 UG/DL (ref 38–169)
LYMPHOCYTES # BLD AUTO: 2.5 X10E3/UL (ref 0.7–3.1)
LYMPHOCYTES NFR BLD AUTO: 23 %
MCH RBC QN AUTO: 29.2 PG (ref 26.6–33)
MCHC RBC AUTO-ENTMCNC: 31.3 G/DL (ref 31.5–35.7)
MCV RBC AUTO: 93 FL (ref 79–97)
METHYLMALONATE SERPL-SCNC: 130 NMOL/L (ref 0–378)
MONOCYTES # BLD AUTO: 0.6 X10E3/UL (ref 0.1–0.9)
MONOCYTES NFR BLD AUTO: 6 %
NEUTROPHILS # BLD AUTO: 7.2 X10E3/UL (ref 1.4–7)
NEUTROPHILS NFR BLD AUTO: 67 %
PLATELET # BLD AUTO: 334 X10E3/UL (ref 150–450)
POTASSIUM SERPL-SCNC: 4.4 MMOL/L (ref 3.5–5.2)
PREALB SERPL-MCNC: 25 MG/DL (ref 10–36)
PROT SERPL-MCNC: 7.3 G/DL (ref 6–8.5)
RBC # BLD AUTO: 4.97 X10E6/UL (ref 4.14–5.8)
SODIUM SERPL-SCNC: 138 MMOL/L (ref 134–144)
VIT B1 BLD-SCNC: 258 NMOL/L (ref 66.5–200)
WBC # BLD AUTO: 10.7 X10E3/UL (ref 3.4–10.8)

## 2025-03-25 ENCOUNTER — RESULTS FOLLOW-UP (OUTPATIENT)
Dept: BARIATRICS/WEIGHT MGMT | Facility: CLINIC | Age: 60
End: 2025-03-25
Payer: COMMERCIAL

## 2025-06-18 ENCOUNTER — OFFICE VISIT (OUTPATIENT)
Dept: PULMONOLOGY | Facility: CLINIC | Age: 60
End: 2025-06-18
Payer: COMMERCIAL

## 2025-06-18 VITALS
SYSTOLIC BLOOD PRESSURE: 131 MMHG | BODY MASS INDEX: 39.3 KG/M2 | HEIGHT: 67 IN | DIASTOLIC BLOOD PRESSURE: 80 MMHG | HEART RATE: 52 BPM | RESPIRATION RATE: 18 BRPM | OXYGEN SATURATION: 97 % | WEIGHT: 250.4 LBS

## 2025-06-18 DIAGNOSIS — G47.33 OSA (OBSTRUCTIVE SLEEP APNEA): Primary | ICD-10-CM

## 2025-06-18 DIAGNOSIS — R06.09 DYSPNEA ON EXERTION: ICD-10-CM

## 2025-06-18 PROCEDURE — 99213 OFFICE O/P EST LOW 20 MIN: CPT | Performed by: NURSE PRACTITIONER

## 2025-06-18 NOTE — PROGRESS NOTES
"Chief Complaint  Sleep Apnea    Subjective          Sandra Garza presents to [facility] for   History of Present Illness    Mr. Garza is a 59 year old male with a medical history significant for arthritis, BPH, diabetes, hyperlipidemia, hypertension, hypothyroidism and sleep apnea.    He presents today for follow up on sleep apnea.  He has had bariatric surgery since he was seen.  He reports losing a little over 100 pounds.  He states that he feels that the pressure on his CPAP is too much.  He reports that he is still using it at night but having a hard time.  He reports that his shortness of breath is better since his weight loss. He is still using albuterol as needed.      Objective   Vital Signs:   /80 (BP Location: Left arm, Patient Position: Sitting, Cuff Size: Adult)   Pulse 52   Resp 18   Ht 170.2 cm (67\")   Wt 114 kg (250 lb 6.4 oz)   SpO2 97%   BMI 39.22 kg/m²         Physical Exam    GENERAL APPEARANCE: Well developed, well nourished, alert and cooperative, and appears to be in no acute distress.    HEAD: normocephalic. Atraumatic.    EYES: PERRL, EOMI. Vision is grossly intact.    THROAT: Oral cavity and pharynx normal. No inflammation, swelling, exudate, or lesions.     NECK: Neck supple.  No thyromegaly.    CARDIAC: Normal S1 and S2. No S3, S4 or murmurs. Rhythm is regular.     RESPIRATORY:Bilateral air entry positive.  No wheezing, crackles or rhonchi noted.    GI: Positive bowel sounds. Soft, nondistended, nontender.     MUSCULOSKELETAL: No significant deformity or joint abnormality. No edema. Peripheral pulses intact. No varicosities.    NEUROLOGICAL: Strength and sensation symmetric and intact throughout.     PSYCHIATRIC: The mental examination revealed the patient was oriented to person, place, and time.       Estimated body mass index is 39.22 kg/m² as calculated from the following:    Height as of this encounter: 170.2 cm " "(67\").    Weight as of this encounter: 114 kg (250 lb 6.4 oz).        Result Review :   The following data was reviewed by: MARIAELENA Clark on 06/18/2025:  Common labs          11/20/2024    03:52 12/27/2024    09:44 3/19/2025    10:54   Common Labs   Glucose 127  97  96    BUN 10  14  15    Creatinine 0.82  0.78  0.72    Sodium 137  142  138    Potassium 4.5  4.6  4.4    Chloride 99  103  102    Calcium 9.3  10.1  9.8    Albumin 4.3  4.5  4.5    Total Bilirubin 0.5  0.5  0.5    Alkaline Phosphatase 71  86  104    AST (SGOT) 55  22  19    ALT (SGPT) 66  21  12    WBC 15.75  12.3  10.7    Hemoglobin 13.8  14.7  14.5    Hematocrit 42.4  44.7  46.3    Platelets 326  333  334       PFT:5/28/24          Low dose lung cancer screening:NA     Previous chest imaging:NA     Alpha-1 antitrypsin screening:NA        ABG:    pH No results found for: \"PHART\"   pO2 No results found for: \"PO2ART\"   pCO2 No results found for: \"RCK0YEL\"   HCO3 No results found for: \"TAG4BYQ\"                      Assessment and Plan    Problem List Items Addressed This Visit          Cardiac and Vasculature    Dyspnea on exertion     Other Visit Diagnoses         EVA (obstructive sleep apnea)    -  Primary    Relevant Orders    Miscellaneous DME            Sandra Garza  reports that he quit smoking about 16 years ago. His smoking use included cigarettes. He started smoking about 44 years ago. He has a 14 pack-year smoking history. He has been exposed to tobacco smoke. He quit smokeless tobacco use about 2 years ago.  His smokeless tobacco use included snuff.        He is compliant with use of cpap nightly.  He states that since his weight loss he feels that his pressures are too high.  Placed order to have pressure turned down from 13 to 11.     Patient was educated on positive airway pressure treatment.  As per CMS guidelines, more than 4 hours on 70% of observed nights is considered adherence. Patient was strongly encouraged to " use CPAP as much as possible during sleep as more CPAP use is equal to more benefit. Use of heated humidification in positive airway pressure treatment to improve the adherence to the device.  In case of claustrophobia, we will provide the patient cognitive behavioral therapy and desensitization. Oral appliances use will be discussed with the patient in case of mild to moderate sleep apnea or if the patient with severe disease fail positive airway pressure treatment.       The patient was extensively educated on the consequences of untreated obstructive sleep apnea namely cardiovascular/metabolic disorder, neurocognitive deficit, daytime sleepiness, motor vehicle accidents, depression, mood disorders and reduced quality of life.  At the end of conversation, the patient voices understanding of the disease process and treatment modality.  Patient also understands the risk of untreated obstructive sleep apnea and benefit benefits of the treatment.    Counseling time was greater than 10 minutes.      Continue albuterol inhaler as needed.          Follow Up   Return in about 6 months (around 12/18/2025).  Patient was given instructions and counseling regarding his condition or for health maintenance advice. Please see specific information pulled into the AVS if appropriate.

## 2025-07-08 ENCOUNTER — OFFICE VISIT (OUTPATIENT)
Dept: BARIATRICS/WEIGHT MGMT | Facility: CLINIC | Age: 60
End: 2025-07-08
Payer: COMMERCIAL

## 2025-07-08 VITALS
BODY MASS INDEX: 37.29 KG/M2 | DIASTOLIC BLOOD PRESSURE: 80 MMHG | HEART RATE: 59 BPM | OXYGEN SATURATION: 99 % | SYSTOLIC BLOOD PRESSURE: 124 MMHG | TEMPERATURE: 98.4 F | RESPIRATION RATE: 18 BRPM | HEIGHT: 67 IN | WEIGHT: 237.6 LBS

## 2025-07-08 DIAGNOSIS — K91.2 POSTGASTRECTOMY MALABSORPTION: ICD-10-CM

## 2025-07-08 DIAGNOSIS — E55.9 VITAMIN D DEFICIENCY: ICD-10-CM

## 2025-07-08 DIAGNOSIS — R53.83 FATIGUE, UNSPECIFIED TYPE: ICD-10-CM

## 2025-07-08 DIAGNOSIS — Z90.3 POSTGASTRECTOMY MALABSORPTION: ICD-10-CM

## 2025-07-08 DIAGNOSIS — E66.812 OBESITY, CLASS II, BMI 35-39.9: Primary | ICD-10-CM

## 2025-07-08 DIAGNOSIS — R79.0 ABNORMAL BLOOD LEVEL OF IRON: ICD-10-CM

## 2025-07-08 PROCEDURE — 99214 OFFICE O/P EST MOD 30 MIN: CPT | Performed by: NURSE PRACTITIONER

## 2025-07-08 NOTE — PROGRESS NOTES
[facility]  [address]    [facility] [id number] [phone]      Patient Name:  Sandra Garza  :  1965      Date of Visit: 2025      Reason for Visit:  7 months postop      HPI:  Sandra Garza is a 59 y.o. male s/p robotic LSG/HHR/liver bx(minimal steatosis negative fibrosis) 2024 Dr. Reeves      Stopped HTN, diabetes medications about 1 month ago by PCP.     Doing well, feeling great!  Denies dysphagia, reflux, nausea, vomiting, and abdominal pain.  On Omeprazole .  Finished Actigall.    Getting 75-100g prot/day.  Drinking 1-2 protein shakes per day- depending on protein intake for the day. Eats 3 meals per day, plus snacks. Drinking 64 fluid oz/day.      Physical activity: walking, active at home. Limited d/t hip and knee pain, walks with cane. Possibly getting knee replacement this fall.    Labs 3/19/25- acceptable. Taking MVI, B12, B1, Calcium, Vit D, iron, and Vit C and biotin.        Presurgery weight:  346 pounds. Today's weight is 108 kg (237 lb 9.6 oz) pounds, today's Body mass index is 37.21 kg/m²., and weight loss since surgery is 109 pounds.       Past Medical History:   Diagnosis Date    Arthritis     (R) hip, on Diclofenac + Norco    Aspirin long-term use     for routine prevention    BPH (benign prostatic hyperplasia)     Cataract, bilateral     Chronic bronchitis     prn inhaler/steroid use, does not follow w/ pulmonary    Diabetes mellitus     Dyspepsia     Dyspnea on exertion     Fatigue     Former tobacco use     quit dip , quit smoking     Heartburn     chronic/episodic, prn OTC PPIs, denies prior eval    History of being tatooed     Hyperlipidemia     Hypertension     Hypothyroidism     Impaired mobility     d/t chronic hip pain, ambulates w/ cane    Kidney stone     Morbid obesity with BMI of 50.0-59.9, adult     Peripheral edema     Seasonal allergies     Sleep apnea     CPAP compliant    Wears  "glasses     reading only     Past Surgical History:   Procedure Laterality Date    APPENDECTOMY OPEN  1976    CATARACT EXTRACTION W/ INTRAOCULAR LENS IMPLANT Right 07/26/2018    Procedure: CATARACT PHACO EXTRACTION WITH INTRAOCULAR LENS IMPLANT RIGHT;  Surgeon: Melquiades Campo MD;  Location: [facility] OR;  Service: Ophthalmology    CATARACT EXTRACTION W/ INTRAOCULAR LENS IMPLANT Left 08/09/2018    Procedure: CATARACT PHACO EXTRACTION WITH INTRAOCULAR LENS IMPLANT LEFT;  Surgeon: Melquiades Campo MD;  Location: [facility] OR;  Service: Ophthalmology    COLONOSCOPY N/A 07/28/2021    Procedure: COLONOSCOPY WITH POLYPECTOMY;  Surgeon: Jimmy Donohue MD;  Location: [facility] ENDOSCOPY;  Service: Gastroenterology;  Laterality: N/A;    EAR TUBES  1978    ENDOSCOPY N/A 8/19/2024    Procedure: ESOPHAGOGASTRODUODENOSCOPY WITH BIOPSY;  Surgeon: Leonila Reeves MD;  Location:  [facility];  Service: General;  Laterality: N/A;    ENDOSCOPY N/A 11/19/2024    Procedure: ESOPHAGOGASTRODUODENOSCOPY;  Surgeon: Leonila Reeves MD;  Location:  [facility] OR;  Service: Bariatric;  Laterality: N/A;    GASTRIC SLEEVE LAPAROSCOPIC N/A 11/19/2024    Procedure: GASTRIC SLEEVE WITH HIATAL HERNIA LAPAROSCOPIC WITH DAVINCI ROBOT;  Surgeon: Leonila Reeves MD;  Location:  [facility] OR;  Service: Robotics - [place];  Laterality: N/A;    INCISION AND DRAINAGE ABSCESS  2008    \"INGROWN HAIR\" LEFT PUBIC AREA, required packing, hospitalized w/ IV abx 5 days.    LIVER BIOPSY N/A 11/19/2024    Procedure: LIVER BIOPSY LAPAROSCOPIC;  Surgeon: Leonila Reeves MD;  Location:  [facility] OR;  Service: Robotics - Da[place]inci;  Laterality: N/A;    TONSILLECTOMY AND ADENOIDECTOMY  1972     Outpatient Medications Marked as Taking for the 7/8/25 encounter (Office Visit) with Arcelia Jesus APRN   Medication Sig Dispense Refill    acetaminophen (TYLENOL) 650 MG 8 hr tablet Take 1 tablet by mouth Every 8 (Eight) Hours As Needed for Mild Pain.      albuterol sulfate " " (90 Base) MCG/ACT inhaler Inhale 2 puffs Every 4 (Four) Hours As Needed for Wheezing. 18 g 5    atorvastatin (LIPITOR) 20 MG tablet Take 1 tablet by mouth Daily.      fluticasone (FLONASE) 50 MCG/ACT nasal spray Administer 2 sprays into the nostril(s) as directed by provider As Needed.      levothyroxine (SYNTHROID, LEVOTHROID) 137 MCG tablet Take 1 tablet by mouth Daily.      meclizine (ANTIVERT) 25 MG tablet Take 1 tablet by mouth 3 (Three) Times a Day As Needed.      omeprazole (priLOSEC) 40 MG capsule Take 1 capsule by mouth Daily for 360 days. 90 capsule 3    tamsulosin (FLOMAX) 0.4 MG capsule 24 hr capsule Take 1 capsule by mouth Daily.       Allergies   Allergen Reactions    Sulfa Antibiotics Nausea And Vomiting       Social History     Socioeconomic History    Marital status:    Tobacco Use    Smoking status: Former     Current packs/day: 0.00     Average packs/day: 0.5 packs/day for 28.0 years (14.0 ttl pk-yrs)     Types: Cigarettes     Start date: 1980     Quit date: 2008     Years since quittin.9     Passive exposure: Past    Smokeless tobacco: Former     Types: Snuff     Quit date:    Vaping Use    Vaping status: Never Used   Substance and Sexual Activity    Alcohol use: Yes     Comment: occ.    Drug use: No    Sexual activity: Defer     Social History     Social History Narrative    Lives in [place], [place].   with 3 adult children.  Works w/ [employer] - .       /80 (BP Location: Left arm, Patient Position: Sitting)   Pulse 59   Temp 98.4 °F (36.9 °C)   Resp 18   Ht 170.2 cm (67\")   Wt 108 kg (237 lb 9.6 oz)   SpO2 99%   BMI 37.21 kg/m²     Physical Exam  Vitals reviewed.   Constitutional:       General: He is not in acute distress.  Cardiovascular:      Rate and Rhythm: Normal rate.   Pulmonary:      Effort: Pulmonary effort is normal.   Musculoskeletal:         General: Normal range of motion.   Neurological:      Mental " Status: He is alert.   Psychiatric:         Mood and Affect: Mood normal.           Assessment:  7 month s/p robotic LSG/HHR/liver bx(minimal steatosis negative fibrosis) 11/19/2024 Dr. Reeves    ICD-10-CM ICD-9-CM   1. Obesity, Class II, BMI 35-39.9  E66.812 278.00   2. Postgastrectomy malabsorption  K91.2 579.3    Z90.3    3. Fatigue, unspecified type  R53.83 780.79   4. Vitamin D deficiency  E55.9 268.9   5. Abnormal blood level of iron  R79.0 790.6         Class 2 Severe Obesity (BMI >=35 and <=39.9). Obesity-related health conditions include the following: see plan. Obesity is improving with treatment. BMI is is above average; BMI management plan is completed. We discussed see plan.      Plan:  Doing well.  Continue protein 100g/day.  Continue routine physical activity as able.  Routine bariatric labs ordered.  Continue vitamins w/ adjustments pending lab results- wishes to simplify to MVI.  May try to wean off PPI.  Call w/ problems/concerns.    The patient was instructed to follow up in 3 months, sooner if needed.     I spent 30 minutes caring for Sandra on this date of service. This time includes time spent by me in the following activities: preparing for the visit, reviewing tests, obtaining and/or reviewing a separately obtained history, performing a medically appropriate examination and/or evaluation, counseling and educating the patient/family/caregiver, ordering medications, tests, or procedures, and documenting information in the medical record.      Arcelia Jesus, APRN

## 2025-07-11 LAB
25(OH)D3+25(OH)D2 SERPL-MCNC: 51.5 NG/ML (ref 30–100)
ALBUMIN SERPL-MCNC: 4.6 G/DL (ref 3.8–4.9)
ALP SERPL-CCNC: 86 IU/L (ref 44–121)
ALT SERPL-CCNC: 17 IU/L (ref 0–44)
AST SERPL-CCNC: 23 IU/L (ref 0–40)
BASOPHILS # BLD AUTO: 0.1 X10E3/UL (ref 0–0.2)
BASOPHILS NFR BLD AUTO: 1 %
BILIRUB SERPL-MCNC: 0.7 MG/DL (ref 0–1.2)
BUN SERPL-MCNC: 17 MG/DL (ref 6–24)
BUN/CREAT SERPL: 20 (ref 9–20)
CALCIUM SERPL-MCNC: 9.9 MG/DL (ref 8.7–10.2)
CHLORIDE SERPL-SCNC: 101 MMOL/L (ref 96–106)
CO2 SERPL-SCNC: 25 MMOL/L (ref 20–29)
CREAT SERPL-MCNC: 0.87 MG/DL (ref 0.76–1.27)
EGFRCR SERPLBLD CKD-EPI 2021: 100 ML/MIN/1.73
EOSINOPHIL # BLD AUTO: 0.5 X10E3/UL (ref 0–0.4)
EOSINOPHIL NFR BLD AUTO: 3 %
ERYTHROCYTE [DISTWIDTH] IN BLOOD BY AUTOMATED COUNT: 15.8 % (ref 11.6–15.4)
FERRITIN SERPL-MCNC: 506 NG/ML (ref 30–400)
FOLATE SERPL-MCNC: 7.7 NG/ML
GLOBULIN SER CALC-MCNC: 2.8 G/DL (ref 1.5–4.5)
GLUCOSE SERPL-MCNC: 101 MG/DL (ref 70–99)
HCT VFR BLD AUTO: 51 % (ref 37.5–51)
HGB BLD-MCNC: 15.4 G/DL (ref 13–17.7)
IMM GRANULOCYTES # BLD AUTO: 0 X10E3/UL (ref 0–0.1)
IMM GRANULOCYTES NFR BLD AUTO: 0 %
IRON SERPL-MCNC: 103 UG/DL (ref 38–169)
LYMPHOCYTES # BLD AUTO: 2.7 X10E3/UL (ref 0.7–3.1)
LYMPHOCYTES NFR BLD AUTO: 18 %
MCH RBC QN AUTO: 30.1 PG (ref 26.6–33)
MCHC RBC AUTO-ENTMCNC: 30.2 G/DL (ref 31.5–35.7)
MCV RBC AUTO: 100 FL (ref 79–97)
METHYLMALONATE SERPL-SCNC: 157 NMOL/L (ref 0–378)
MONOCYTES # BLD AUTO: 0.9 X10E3/UL (ref 0.1–0.9)
MONOCYTES NFR BLD AUTO: 6 %
NEUTROPHILS # BLD AUTO: 10.6 X10E3/UL (ref 1.4–7)
NEUTROPHILS NFR BLD AUTO: 72 %
PLATELET # BLD AUTO: 406 X10E3/UL (ref 150–450)
POTASSIUM SERPL-SCNC: 5.2 MMOL/L (ref 3.5–5.2)
PREALB SERPL-MCNC: 28 MG/DL (ref 10–36)
PROT SERPL-MCNC: 7.4 G/DL (ref 6–8.5)
RBC # BLD AUTO: 5.12 X10E6/UL (ref 4.14–5.8)
SODIUM SERPL-SCNC: 143 MMOL/L (ref 134–144)
VIT B1 BLD-SCNC: 257.7 NMOL/L (ref 66.5–200)
WBC # BLD AUTO: 14.8 X10E3/UL (ref 3.4–10.8)

## 2025-07-16 ENCOUNTER — RESULTS FOLLOW-UP (OUTPATIENT)
Dept: BARIATRICS/WEIGHT MGMT | Facility: CLINIC | Age: 60
End: 2025-07-16
Payer: COMMERCIAL

## 2025-07-25 ENCOUNTER — TELEPHONE (OUTPATIENT)
Dept: CARDIOLOGY | Facility: CLINIC | Age: 60
End: 2025-07-25

## 2025-08-08 ENCOUNTER — APPOINTMENT (OUTPATIENT)
Dept: GENERAL RADIOLOGY | Facility: HOSPITAL | Age: 60
End: 2025-08-08
Payer: COMMERCIAL

## 2025-08-08 ENCOUNTER — OFFICE VISIT (OUTPATIENT)
Dept: CARDIOLOGY | Facility: CLINIC | Age: 60
End: 2025-08-08
Payer: COMMERCIAL

## 2025-08-08 ENCOUNTER — HOSPITAL ENCOUNTER (EMERGENCY)
Facility: HOSPITAL | Age: 60
Discharge: HOME | End: 2025-08-08
Payer: COMMERCIAL

## 2025-08-08 VITALS
SYSTOLIC BLOOD PRESSURE: 123 MMHG | TEMPERATURE: 98.2 F | HEART RATE: 53 BPM | DIASTOLIC BLOOD PRESSURE: 76 MMHG | HEIGHT: 67 IN | OXYGEN SATURATION: 95 % | WEIGHT: 242 LBS | BODY MASS INDEX: 37.98 KG/M2 | RESPIRATION RATE: 13 BRPM

## 2025-08-08 VITALS
HEIGHT: 67 IN | WEIGHT: 242 LBS | OXYGEN SATURATION: 98 % | BODY MASS INDEX: 37.98 KG/M2 | SYSTOLIC BLOOD PRESSURE: 126 MMHG | HEART RATE: 39 BPM | DIASTOLIC BLOOD PRESSURE: 74 MMHG

## 2025-08-08 DIAGNOSIS — R00.1 SYMPTOMATIC BRADYCARDIA: Primary | ICD-10-CM

## 2025-08-08 DIAGNOSIS — E78.2 MIXED HYPERLIPIDEMIA: ICD-10-CM

## 2025-08-08 DIAGNOSIS — R00.1 BRADYCARDIA, SINUS: Primary | ICD-10-CM

## 2025-08-08 DIAGNOSIS — E03.9 HYPOTHYROIDISM, UNSPECIFIED TYPE: ICD-10-CM

## 2025-08-08 PROBLEM — G89.29 CHRONIC CHEST PAIN WITH LOW TO MODERATE RISK FOR CAD: Status: ACTIVE | Noted: 2025-08-08

## 2025-08-08 PROBLEM — R07.9 CHRONIC CHEST PAIN WITH LOW TO MODERATE RISK FOR CAD: Status: ACTIVE | Noted: 2025-08-08

## 2025-08-08 PROBLEM — Z91.89 CHRONIC CHEST PAIN WITH LOW TO MODERATE RISK FOR CAD: Status: ACTIVE | Noted: 2025-08-08

## 2025-08-08 LAB
ALBUMIN SERPL-MCNC: 4.6 G/DL (ref 3.5–5.2)
ALBUMIN/GLOB SERPL: 1.6 G/DL
ALP SERPL-CCNC: 78 U/L (ref 39–117)
ALT SERPL W P-5'-P-CCNC: 15 U/L (ref 1–41)
ANION GAP SERPL CALCULATED.3IONS-SCNC: 12 MMOL/L (ref 5–15)
AST SERPL-CCNC: 19 U/L (ref 1–40)
BASOPHILS # BLD AUTO: 0.08 10*3/MM3 (ref 0–0.2)
BASOPHILS NFR BLD AUTO: 0.8 % (ref 0–1.5)
BILIRUB SERPL-MCNC: 0.5 MG/DL (ref 0–1.2)
BUN SERPL-MCNC: 16.5 MG/DL (ref 6–20)
BUN/CREAT SERPL: 25.8 (ref 7–25)
CALCIUM SPEC-SCNC: 9.2 MG/DL (ref 8.6–10.5)
CHLORIDE SERPL-SCNC: 102 MMOL/L (ref 98–107)
CO2 SERPL-SCNC: 25 MMOL/L (ref 22–29)
CREAT SERPL-MCNC: 0.64 MG/DL (ref 0.76–1.27)
DEPRECATED RDW RBC AUTO: 48.1 FL (ref 37–54)
EGFRCR SERPLBLD CKD-EPI 2021: 109.7 ML/MIN/1.73
EOSINOPHIL # BLD AUTO: 0.36 10*3/MM3 (ref 0–0.4)
EOSINOPHIL NFR BLD AUTO: 3.7 % (ref 0.3–6.2)
ERYTHROCYTE [DISTWIDTH] IN BLOOD BY AUTOMATED COUNT: 14.1 % (ref 12.3–15.4)
GEN 5 1HR TROPONIN T REFLEX: 9 NG/L
GLOBULIN UR ELPH-MCNC: 2.9 GM/DL
GLUCOSE SERPL-MCNC: 107 MG/DL (ref 65–99)
HCT VFR BLD AUTO: 42 % (ref 37.5–51)
HGB BLD-MCNC: 13.8 G/DL (ref 13–17.7)
HOLD SPECIMEN: NORMAL
HOLD SPECIMEN: NORMAL
IMM GRANULOCYTES # BLD AUTO: 0.02 10*3/MM3 (ref 0–0.05)
IMM GRANULOCYTES NFR BLD AUTO: 0.2 % (ref 0–0.5)
LYMPHOCYTES # BLD AUTO: 2.62 10*3/MM3 (ref 0.7–3.1)
LYMPHOCYTES NFR BLD AUTO: 27 % (ref 19.6–45.3)
MCH RBC QN AUTO: 30.3 PG (ref 26.6–33)
MCHC RBC AUTO-ENTMCNC: 32.9 G/DL (ref 31.5–35.7)
MCV RBC AUTO: 92.1 FL (ref 79–97)
MONOCYTES # BLD AUTO: 0.63 10*3/MM3 (ref 0.1–0.9)
MONOCYTES NFR BLD AUTO: 6.5 % (ref 5–12)
NEUTROPHILS NFR BLD AUTO: 5.99 10*3/MM3 (ref 1.7–7)
NEUTROPHILS NFR BLD AUTO: 61.8 % (ref 42.7–76)
NRBC BLD AUTO-RTO: 0 /100 WBC (ref 0–0.2)
NT-PROBNP SERPL-MCNC: 92.7 PG/ML (ref 0–900)
PLATELET # BLD AUTO: 306 10*3/MM3 (ref 140–450)
PMV BLD AUTO: 9.9 FL (ref 6–12)
POTASSIUM SERPL-SCNC: 4 MMOL/L (ref 3.5–5.2)
PROT SERPL-MCNC: 7.5 G/DL (ref 6–8.5)
QT INTERVAL: 524 MS
QTC INTERVAL: 432 MS
RBC # BLD AUTO: 4.56 10*6/MM3 (ref 4.14–5.8)
SODIUM SERPL-SCNC: 139 MMOL/L (ref 136–145)
T4 FREE SERPL-MCNC: 1.21 NG/DL (ref 0.92–1.68)
TROPONIN T NUMERIC DELTA: 2 NG/L
TROPONIN T SERPL HS-MCNC: 7 NG/L
TSH SERPL DL<=0.05 MIU/L-ACNC: 6.94 UIU/ML (ref 0.27–4.2)
WBC NRBC COR # BLD AUTO: 9.7 10*3/MM3 (ref 3.4–10.8)
WHOLE BLOOD HOLD COAG: NORMAL
WHOLE BLOOD HOLD SPECIMEN: NORMAL

## 2025-08-08 PROCEDURE — 84484 ASSAY OF TROPONIN QUANT: CPT

## 2025-08-08 PROCEDURE — 93000 ELECTROCARDIOGRAM COMPLETE: CPT | Performed by: INTERNAL MEDICINE

## 2025-08-08 PROCEDURE — 99214 OFFICE O/P EST MOD 30 MIN: CPT | Performed by: INTERNAL MEDICINE

## 2025-08-08 PROCEDURE — 84439 ASSAY OF FREE THYROXINE: CPT

## 2025-08-08 PROCEDURE — 36415 COLL VENOUS BLD VENIPUNCTURE: CPT

## 2025-08-08 PROCEDURE — 83880 ASSAY OF NATRIURETIC PEPTIDE: CPT

## 2025-08-08 PROCEDURE — 99284 EMERGENCY DEPT VISIT MOD MDM: CPT

## 2025-08-08 PROCEDURE — 71045 X-RAY EXAM CHEST 1 VIEW: CPT

## 2025-08-08 PROCEDURE — 71045 X-RAY EXAM CHEST 1 VIEW: CPT | Performed by: RADIOLOGY

## 2025-08-08 PROCEDURE — 93005 ELECTROCARDIOGRAM TRACING: CPT

## 2025-08-08 PROCEDURE — 80050 GENERAL HEALTH PANEL: CPT

## 2025-08-08 RX ORDER — SODIUM CHLORIDE 0.9 % (FLUSH) 0.9 %
10 SYRINGE (ML) INJECTION AS NEEDED
Status: DISCONTINUED | OUTPATIENT
Start: 2025-08-08 | End: 2025-08-08 | Stop reason: HOSPADM

## 2025-08-08 RX ORDER — ASPIRIN 81 MG/1
324 TABLET, CHEWABLE ORAL ONCE
Status: COMPLETED | OUTPATIENT
Start: 2025-08-08 | End: 2025-08-08

## 2025-08-08 RX ADMIN — ASPIRIN 81 MG CHEWABLE TABLET 324 MG: 81 TABLET CHEWABLE at 11:03

## 2025-08-11 ENCOUNTER — OFFICE VISIT (OUTPATIENT)
Dept: CARDIOLOGY | Facility: CLINIC | Age: 60
End: 2025-08-11
Payer: COMMERCIAL

## 2025-08-11 ENCOUNTER — TELEPHONE (OUTPATIENT)
Dept: CARDIOLOGY | Facility: CLINIC | Age: 60
End: 2025-08-11

## 2025-08-11 VITALS
DIASTOLIC BLOOD PRESSURE: 76 MMHG | SYSTOLIC BLOOD PRESSURE: 144 MMHG | HEART RATE: 39 BPM | HEIGHT: 67 IN | BODY MASS INDEX: 37.67 KG/M2 | WEIGHT: 240 LBS

## 2025-08-11 DIAGNOSIS — E03.9 HYPOTHYROIDISM, UNSPECIFIED TYPE: ICD-10-CM

## 2025-08-11 DIAGNOSIS — R00.1 BRADYCARDIA, SINUS: Primary | ICD-10-CM

## 2025-08-11 DIAGNOSIS — E78.2 MIXED HYPERLIPIDEMIA: ICD-10-CM

## 2025-08-11 PROCEDURE — 93000 ELECTROCARDIOGRAM COMPLETE: CPT | Performed by: INTERNAL MEDICINE

## 2025-08-11 PROCEDURE — 99214 OFFICE O/P EST MOD 30 MIN: CPT | Performed by: INTERNAL MEDICINE

## 2025-08-11 RX ORDER — LEVOTHYROXINE SODIUM 150 UG/1
150 TABLET ORAL
Qty: 90 TABLET | Refills: 0 | Status: SHIPPED | OUTPATIENT
Start: 2025-08-11

## 2025-08-11 RX ORDER — DICLOFENAC SODIUM 75 MG/1
1 TABLET, DELAYED RELEASE ORAL EVERY 12 HOURS SCHEDULED
COMMUNITY
Start: 2025-07-31

## 2025-08-11 RX ORDER — LEVOTHYROXINE SODIUM 137 UG/1
137 TABLET ORAL DAILY
Status: CANCELLED | OUTPATIENT
Start: 2025-08-11

## (undated) DEVICE — LAPAROVUE VISIBILITY SYSTEM LAPAROSCOPIC SOLUTIONS: Brand: LAPAROVUE

## (undated) DEVICE — GLV SURG SENSICARE PI MIC PF SZ6 LF STRL

## (undated) DEVICE — SLIT KNIFE FULL HDL-2.85MM ANG: Brand: SHARPOINT

## (undated) DEVICE — CANN IRR/INJ AIR ANT CHAMBER 6MM BEND 27G

## (undated) DEVICE — Device

## (undated) DEVICE — SOL IRRIG H2O 1000ML STRL

## (undated) DEVICE — HP CONCL INTREPID COAX I/A CRV .3MM

## (undated) DEVICE — ENDOSCOPY PORT CONNECTOR FOR OLYMPUS® SCOPES: Brand: ERBE

## (undated) DEVICE — MICROSURGICAL INSTRUMENT IRR. CYSTITSOME 27GA STRAIGHT-REVERSE CUTTING: Brand: ALCON

## (undated) DEVICE — BLANKT WARM UPPR/BDY ARM/OUT 57X196CM

## (undated) DEVICE — LUBE JELLY PK/2.75GM STRL BX/144

## (undated) DEVICE — Device: Brand: DEFENDO AIR/WATER/SUCTION AND BIOPSY VALVE

## (undated) DEVICE — SYR LL TP 10ML STRL

## (undated) DEVICE — VESSEL SEALER EXTEND: Brand: ENDOWRIST

## (undated) DEVICE — GLV SURG SENSICARE W/ALOE PF LF 8 STRL

## (undated) DEVICE — COLUMN DRAPE

## (undated) DEVICE — 15 DEG. MICROKNIFE - 3MM: Brand: SHARPOINT

## (undated) DEVICE — CONTN GRAD MEAS TRIANG 32OZ BLK

## (undated) DEVICE — AIRSEAL 8 MM CANNULA CAP AND OBTURATOR WITH BLADELESS OPTICAL TIP COMPATIBLE WITH INTUITIVE DA VINCI XI AND DA VINCI X 8 MM INSTRUMENT CANNULA, LONG LENGTH: Brand: ARS LONG

## (undated) DEVICE — GLV SURG DERMASSURE GRN LF PF 7.0

## (undated) DEVICE — BLADELESS OBTURATOR, LONG: Brand: WECK VISTA

## (undated) DEVICE — SINGLE-USE BIOPSY FORCEPS: Brand: RADIAL JAW 4

## (undated) DEVICE — SHT AIR TRANSFR COMFRT GLIDE LAT 40X80IN

## (undated) DEVICE — GLV SURG SENSICARE PI MIC PF SZ6.5 LF STRL

## (undated) DEVICE — RUBBERBAND LF STRL PK/2

## (undated) DEVICE — STAPLER 60: Brand: SUREFORM

## (undated) DEVICE — SEALANT WND FIBRIN TISSEEL PREFIL/SYR/PRIMAFZ 4ML

## (undated) DEVICE — PK BARIATRIC 10

## (undated) DEVICE — ARM DRAPE

## (undated) DEVICE — SYS CALIB SLV/GASTRECTOMY VISIGI W/BULB 36F STD

## (undated) DEVICE — SEAL

## (undated) DEVICE — REDUCER: Brand: ENDOWRIST

## (undated) DEVICE — HYBRID TUBING/CAP SET FOR OLYMPUS® SCOPES: Brand: ERBE

## (undated) DEVICE — ST TBG AIRSEAL BIF FLTR W/ACT/CHARCOAL/FLTR

## (undated) DEVICE — VLV SXN AIR/H2O ORCAPOD3 1P/U STRL

## (undated) DEVICE — THE DEVICE IS A DISPOSABLE, LIGATURE PASSING, SUTURING APPARATUS AND NEEDLE GUIDE FOR THE ABDOMINAL WALL WHICH IS NON-POWERED, HAND-HELD, AND HAND-MANIPULATED,INTENDED TO BE USED IN VARIOUS GENERAL SURGICAL PROCEDURES. THE DEVICE INCLUDES A LIGATURE CARRIER PATHWAY, NEEDLE GUIDE, TWO NEEDLES, REFERENCE PLANE T-BAR, AND A GUIDEWIRE. THE HANDLE OF THE DEVICE PROVIDES TWO DIAMETRICALLY OPPOSED ENCLOSED GUIDEWAYS FOR THE ADVANCEMENT AND RETRACTION OF THE NEEDLES UNDER MANUAL CONTROL OF A PLUNGER LOCATED AT THE PROXIMAL END OF THE DEVICE.AS PART OF THE M-CLOSE CONVENIENCE KIT, A NERVE BLOCK NEEDLE IS INCLUDED FOR THE ADMINISTRATION OF LOCAL ANESTHETIC AGENTS TO PROVIDE REGIONAL AND LOCAL ANESTHESIA.  A TELFA ANTIMICROBIAL, NON-ADHERENT PAD IS ALSO PROVIDED IN THE KIT FOR USE AS A PRIMARY DRESSING FOR THE SURGICAL INCISION.: Brand: M-CLOSE KIT

## (undated) DEVICE — GOWN,PREVENTION PLUS,XLARGE,STERILE: Brand: MEDLINE

## (undated) DEVICE — APL DUPLOSPRAYER MIS 40CM

## (undated) DEVICE — GLV SURG SENSICARE W/ALOE PF LF 8.5 STRL

## (undated) DEVICE — UNDRPD COMFRT GLD DRYPAD 36X57IN

## (undated) DEVICE — BLD OPTH EDGEAHEAD SLIT XSTAR 2.8MM

## (undated) DEVICE — MAX-CORE® DISPOSABLE CORE BIOPSY INSTRUMENT, 18G X 25CM: Brand: MAX-CORE

## (undated) DEVICE — GOWN,NON-REINFORCED,SIRUS,SET IN SLV,XL: Brand: MEDLINE